# Patient Record
Sex: FEMALE | Race: WHITE | Employment: OTHER | URBAN - METROPOLITAN AREA
[De-identification: names, ages, dates, MRNs, and addresses within clinical notes are randomized per-mention and may not be internally consistent; named-entity substitution may affect disease eponyms.]

---

## 2017-01-10 RX ORDER — DILTIAZEM HYDROCHLORIDE 180 MG/1
180 CAPSULE, COATED, EXTENDED RELEASE ORAL DAILY
Qty: 30 CAPSULE | Refills: 3 | Status: SHIPPED | OUTPATIENT
Start: 2017-01-10 | End: 2018-06-05 | Stop reason: SDUPTHER

## 2017-01-18 ENCOUNTER — NURSE ONLY (OUTPATIENT)
Dept: CARDIOLOGY CLINIC | Age: 82
End: 2017-01-18

## 2017-01-18 DIAGNOSIS — R55 SYNCOPE AND COLLAPSE: ICD-10-CM

## 2017-01-18 DIAGNOSIS — Z45.09 ENCOUNTER FOR ELECTRONIC ANALYSIS OF REVEAL EVENT RECORDER: ICD-10-CM

## 2017-01-18 PROCEDURE — 93299 PR REM INTERROG ICPMS/SCRMS <30 D TECH REVIEW: CPT | Performed by: INTERNAL MEDICINE

## 2017-01-18 PROCEDURE — 93298 REM INTERROG DEV EVAL SCRMS: CPT | Performed by: INTERNAL MEDICINE

## 2017-02-09 PROBLEM — M81.0 OSTEOPOROSIS: Chronic | Status: ACTIVE | Noted: 2017-02-09

## 2017-02-09 PROBLEM — Z51.81 MEDICATION MONITORING ENCOUNTER: Chronic | Status: ACTIVE | Noted: 2017-02-09

## 2017-02-22 ENCOUNTER — NURSE ONLY (OUTPATIENT)
Dept: CARDIOLOGY CLINIC | Age: 82
End: 2017-02-22

## 2017-02-22 DIAGNOSIS — Z45.09 ENCOUNTER FOR ELECTRONIC ANALYSIS OF REVEAL EVENT RECORDER: ICD-10-CM

## 2017-02-22 DIAGNOSIS — R55 SYNCOPE AND COLLAPSE: ICD-10-CM

## 2017-02-22 PROCEDURE — 93299 PR REM INTERROG ICPMS/SCRMS <30 D TECH REVIEW: CPT | Performed by: INTERNAL MEDICINE

## 2017-02-22 PROCEDURE — 93298 REM INTERROG DEV EVAL SCRMS: CPT | Performed by: INTERNAL MEDICINE

## 2017-03-29 ENCOUNTER — NURSE ONLY (OUTPATIENT)
Dept: CARDIOLOGY CLINIC | Age: 82
End: 2017-03-29

## 2017-03-29 DIAGNOSIS — Z45.09 ENCOUNTER FOR ELECTRONIC ANALYSIS OF REVEAL EVENT RECORDER: ICD-10-CM

## 2017-03-29 DIAGNOSIS — R55 SYNCOPE AND COLLAPSE: ICD-10-CM

## 2017-03-29 PROCEDURE — 93298 REM INTERROG DEV EVAL SCRMS: CPT | Performed by: INTERNAL MEDICINE

## 2017-03-29 PROCEDURE — 93299 PR REM INTERROG ICPMS/SCRMS <30 D TECH REVIEW: CPT | Performed by: INTERNAL MEDICINE

## 2017-04-11 ENCOUNTER — HOSPITAL ENCOUNTER (OUTPATIENT)
Dept: OTHER | Age: 82
Discharge: HOME | End: 2017-04-11
Attending: INTERNAL MEDICINE | Admitting: INTERNAL MEDICINE

## 2017-04-11 ENCOUNTER — OFFICE VISIT (OUTPATIENT)
Dept: CARDIOLOGY CLINIC | Age: 82
End: 2017-04-11

## 2017-04-11 VITALS
SYSTOLIC BLOOD PRESSURE: 122 MMHG | HEART RATE: 68 BPM | WEIGHT: 163 LBS | BODY MASS INDEX: 28.88 KG/M2 | OXYGEN SATURATION: 97 % | HEIGHT: 63 IN | DIASTOLIC BLOOD PRESSURE: 64 MMHG

## 2017-04-11 DIAGNOSIS — E55.9 VITAMIN D INSUFFICIENCY: ICD-10-CM

## 2017-04-11 DIAGNOSIS — E55.9 VITAMIN D INSUFFICIENCY: Primary | ICD-10-CM

## 2017-04-11 PROBLEM — Z51.81 MEDICATION MONITORING ENCOUNTER: Status: ACTIVE | Noted: 2017-02-09

## 2017-04-11 LAB — VITAMIN D 25-HYDROXY: 37 NG/ML

## 2017-04-11 PROCEDURE — 1123F ACP DISCUSS/DSCN MKR DOCD: CPT | Performed by: INTERNAL MEDICINE

## 2017-04-11 PROCEDURE — G8427 DOCREV CUR MEDS BY ELIG CLIN: HCPCS | Performed by: INTERNAL MEDICINE

## 2017-04-11 PROCEDURE — G8399 PT W/DXA RESULTS DOCUMENT: HCPCS | Performed by: INTERNAL MEDICINE

## 2017-04-11 PROCEDURE — 99214 OFFICE O/P EST MOD 30 MIN: CPT | Performed by: INTERNAL MEDICINE

## 2017-04-11 PROCEDURE — G8420 CALC BMI NORM PARAMETERS: HCPCS | Performed by: INTERNAL MEDICINE

## 2017-04-11 PROCEDURE — 4040F PNEUMOC VAC/ADMIN/RCVD: CPT | Performed by: INTERNAL MEDICINE

## 2017-04-11 PROCEDURE — 1036F TOBACCO NON-USER: CPT | Performed by: INTERNAL MEDICINE

## 2017-04-11 PROCEDURE — 1090F PRES/ABSN URINE INCON ASSESS: CPT | Performed by: INTERNAL MEDICINE

## 2017-04-11 PROCEDURE — G8598 ASA/ANTIPLAT THER USED: HCPCS | Performed by: INTERNAL MEDICINE

## 2017-04-12 ENCOUNTER — TELEPHONE (OUTPATIENT)
Dept: CARDIOLOGY CLINIC | Age: 82
End: 2017-04-12

## 2017-04-21 ENCOUNTER — TELEPHONE (OUTPATIENT)
Dept: CARDIOLOGY CLINIC | Age: 82
End: 2017-04-21

## 2017-05-03 ENCOUNTER — NURSE ONLY (OUTPATIENT)
Dept: CARDIOLOGY CLINIC | Age: 82
End: 2017-05-03

## 2017-05-03 DIAGNOSIS — R55 SYNCOPE AND COLLAPSE: ICD-10-CM

## 2017-05-03 DIAGNOSIS — Z45.09 ENCOUNTER FOR ELECTRONIC ANALYSIS OF REVEAL EVENT RECORDER: ICD-10-CM

## 2017-05-03 PROCEDURE — 93298 REM INTERROG DEV EVAL SCRMS: CPT | Performed by: INTERNAL MEDICINE

## 2017-05-03 PROCEDURE — 93299 PR REM INTERROG ICPMS/SCRMS <30 D TECH REVIEW: CPT | Performed by: INTERNAL MEDICINE

## 2017-05-05 ENCOUNTER — TELEPHONE (OUTPATIENT)
Dept: CARDIOLOGY CLINIC | Age: 82
End: 2017-05-05

## 2017-06-20 ENCOUNTER — OFFICE VISIT (OUTPATIENT)
Dept: CARDIOLOGY CLINIC | Age: 82
End: 2017-06-20

## 2017-06-20 ENCOUNTER — TELEPHONE (OUTPATIENT)
Dept: CARDIOLOGY CLINIC | Age: 82
End: 2017-06-20

## 2017-06-20 VITALS
HEIGHT: 63 IN | DIASTOLIC BLOOD PRESSURE: 68 MMHG | BODY MASS INDEX: 28.07 KG/M2 | SYSTOLIC BLOOD PRESSURE: 122 MMHG | OXYGEN SATURATION: 97 % | HEART RATE: 84 BPM | WEIGHT: 158.4 LBS

## 2017-06-20 DIAGNOSIS — I65.29 STENOSIS OF CAROTID ARTERY, UNSPECIFIED LATERALITY: ICD-10-CM

## 2017-06-20 DIAGNOSIS — I48.0 PAROXYSMAL ATRIAL FIBRILLATION (HCC): ICD-10-CM

## 2017-06-20 DIAGNOSIS — R00.2 PALPITATIONS: Chronic | ICD-10-CM

## 2017-06-20 DIAGNOSIS — E78.5 HYPERLIPIDEMIA, UNSPECIFIED HYPERLIPIDEMIA TYPE: Chronic | ICD-10-CM

## 2017-06-20 DIAGNOSIS — I10 ESSENTIAL HYPERTENSION: Primary | Chronic | ICD-10-CM

## 2017-06-20 PROBLEM — M81.0 OSTEOPOROSIS: Status: ACTIVE | Noted: 2017-02-09

## 2017-06-20 PROCEDURE — G8598 ASA/ANTIPLAT THER USED: HCPCS | Performed by: INTERNAL MEDICINE

## 2017-06-20 PROCEDURE — 1090F PRES/ABSN URINE INCON ASSESS: CPT | Performed by: INTERNAL MEDICINE

## 2017-06-20 PROCEDURE — 1036F TOBACCO NON-USER: CPT | Performed by: INTERNAL MEDICINE

## 2017-06-20 PROCEDURE — 4040F PNEUMOC VAC/ADMIN/RCVD: CPT | Performed by: INTERNAL MEDICINE

## 2017-06-20 PROCEDURE — G8419 CALC BMI OUT NRM PARAM NOF/U: HCPCS | Performed by: INTERNAL MEDICINE

## 2017-06-20 PROCEDURE — 99213 OFFICE O/P EST LOW 20 MIN: CPT | Performed by: INTERNAL MEDICINE

## 2017-06-20 PROCEDURE — G8399 PT W/DXA RESULTS DOCUMENT: HCPCS | Performed by: INTERNAL MEDICINE

## 2017-06-20 PROCEDURE — G8427 DOCREV CUR MEDS BY ELIG CLIN: HCPCS | Performed by: INTERNAL MEDICINE

## 2017-06-20 PROCEDURE — 1123F ACP DISCUSS/DSCN MKR DOCD: CPT | Performed by: INTERNAL MEDICINE

## 2017-06-21 ENCOUNTER — NURSE ONLY (OUTPATIENT)
Dept: CARDIOLOGY CLINIC | Age: 82
End: 2017-06-21

## 2017-06-21 DIAGNOSIS — R55 SYNCOPE AND COLLAPSE: ICD-10-CM

## 2017-06-21 DIAGNOSIS — Z45.09 ENCOUNTER FOR ELECTRONIC ANALYSIS OF REVEAL EVENT RECORDER: ICD-10-CM

## 2017-06-21 PROCEDURE — 93298 REM INTERROG DEV EVAL SCRMS: CPT | Performed by: INTERNAL MEDICINE

## 2017-06-21 PROCEDURE — 93299 PR REM INTERROG ICPMS/SCRMS <30 D TECH REVIEW: CPT | Performed by: INTERNAL MEDICINE

## 2017-07-26 ENCOUNTER — NURSE ONLY (OUTPATIENT)
Dept: CARDIOLOGY CLINIC | Age: 82
End: 2017-07-26

## 2017-07-26 DIAGNOSIS — Z45.09 ENCOUNTER FOR ELECTRONIC ANALYSIS OF REVEAL EVENT RECORDER: ICD-10-CM

## 2017-07-26 DIAGNOSIS — R55 SYNCOPE AND COLLAPSE: ICD-10-CM

## 2017-07-26 PROCEDURE — 93298 REM INTERROG DEV EVAL SCRMS: CPT | Performed by: INTERNAL MEDICINE

## 2017-07-26 PROCEDURE — 93299 PR REM INTERROG ICPMS/SCRMS <30 D TECH REVIEW: CPT | Performed by: INTERNAL MEDICINE

## 2017-08-30 ENCOUNTER — NURSE ONLY (OUTPATIENT)
Dept: CARDIOLOGY CLINIC | Age: 82
End: 2017-08-30

## 2017-08-30 DIAGNOSIS — R55 SYNCOPE AND COLLAPSE: ICD-10-CM

## 2017-08-30 DIAGNOSIS — Z45.09 ENCOUNTER FOR ELECTRONIC ANALYSIS OF REVEAL EVENT RECORDER: ICD-10-CM

## 2017-08-31 PROCEDURE — 93298 REM INTERROG DEV EVAL SCRMS: CPT | Performed by: INTERNAL MEDICINE

## 2017-08-31 PROCEDURE — 93299 PR REM INTERROG ICPMS/SCRMS <30 D TECH REVIEW: CPT | Performed by: INTERNAL MEDICINE

## 2017-11-07 ENCOUNTER — OFFICE VISIT (OUTPATIENT)
Dept: CARDIOLOGY CLINIC | Age: 82
End: 2017-11-07

## 2017-11-07 VITALS
HEART RATE: 76 BPM | WEIGHT: 160.75 LBS | BODY MASS INDEX: 28.48 KG/M2 | SYSTOLIC BLOOD PRESSURE: 120 MMHG | DIASTOLIC BLOOD PRESSURE: 80 MMHG

## 2017-11-07 DIAGNOSIS — R00.2 PALPITATIONS: Primary | Chronic | ICD-10-CM

## 2017-11-07 DIAGNOSIS — E78.5 HYPERLIPIDEMIA, UNSPECIFIED HYPERLIPIDEMIA TYPE: Chronic | ICD-10-CM

## 2017-11-07 DIAGNOSIS — I10 ESSENTIAL HYPERTENSION: Chronic | ICD-10-CM

## 2017-11-07 DIAGNOSIS — I48.0 PAROXYSMAL ATRIAL FIBRILLATION (HCC): ICD-10-CM

## 2017-11-07 PROCEDURE — 1123F ACP DISCUSS/DSCN MKR DOCD: CPT | Performed by: INTERNAL MEDICINE

## 2017-11-07 PROCEDURE — 99214 OFFICE O/P EST MOD 30 MIN: CPT | Performed by: INTERNAL MEDICINE

## 2017-11-07 PROCEDURE — G8484 FLU IMMUNIZE NO ADMIN: HCPCS | Performed by: INTERNAL MEDICINE

## 2017-11-07 PROCEDURE — G8598 ASA/ANTIPLAT THER USED: HCPCS | Performed by: INTERNAL MEDICINE

## 2017-11-07 PROCEDURE — 93000 ELECTROCARDIOGRAM COMPLETE: CPT | Performed by: INTERNAL MEDICINE

## 2017-11-07 PROCEDURE — G8427 DOCREV CUR MEDS BY ELIG CLIN: HCPCS | Performed by: INTERNAL MEDICINE

## 2017-11-07 PROCEDURE — 1090F PRES/ABSN URINE INCON ASSESS: CPT | Performed by: INTERNAL MEDICINE

## 2017-11-07 PROCEDURE — G8417 CALC BMI ABV UP PARAM F/U: HCPCS | Performed by: INTERNAL MEDICINE

## 2017-11-07 PROCEDURE — 4040F PNEUMOC VAC/ADMIN/RCVD: CPT | Performed by: INTERNAL MEDICINE

## 2017-11-07 PROCEDURE — G8399 PT W/DXA RESULTS DOCUMENT: HCPCS | Performed by: INTERNAL MEDICINE

## 2017-11-07 PROCEDURE — 1036F TOBACCO NON-USER: CPT | Performed by: INTERNAL MEDICINE

## 2017-11-07 NOTE — PROGRESS NOTES
[facility]   Cardiac Evaluation      Patient: Tommy Cortez  YOB: 1935  Date: 11/7/17       Chief Complaint   Patient presents with    Hyperlipidemia    Atrial Fibrillation    3 Month Follow-Up        Referring provider: Rita Hopper MD    History of Present Illness:   Ms Angelica Bang is seen today in follow up. She is a previous patient of Dr Regla Hidalgo. Cardiac history includes chronic atrial fibrillation, hypertension, and hyperlipidemia. She has ILR placed 3/16 after syncopal episode. She does not smoke. Is here with her . She denies any chest pain, palpitations, WARD, dizziness, or edema. She is active and exercises regularly at the [address] [place] [address]. She exercises 4-5 times per week 30 minutes each. 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 Past Medical History:   has a past medical history of Arthritis; Atrial fibrillation ([facility] [address] Cancer ([facility] [address] Carotid artery stenosis; Chronic kidney disease; Fx ankle; History of blood transfusion; Hyperlipidemia; Hypertension; Movement disorder; Psychiatric problem; Shingles; and Thyroid disease. Surgical History:   has a past surgical history that includes Hysterectomy; shoulder surgery; other surgical history; Ankle surgery (2/4/15); Colonoscopy; Endoscopy, colon, diagnostic; eye surgery; Breast surgery; fracture surgery (2010, 2015 ); and other surgical history (3/22/16 ).      Current Outpatient Prescriptions   Medication Sig Dispense Refill    Ergocalciferol 2000 UNITS TABS Take 200 mg by mouth daily 90 tablet 3    diltiazem (CARDIZEM CD) 180 MG extended release capsule Take 1 capsule by mouth daily 30 capsule 3    valsartan (DIOVAN) 80 MG tablet Take 1 tablet by mouth daily 30 tablet 3    rivaroxaban (XARELTO) 20 MG TABS tablet Take 1 tablet by mouth daily 30 tablet 3    atorvastatin (LIPITOR) 20 MG tablet Take 1 tablet by mouth nightly (Patient taking differently: Take 20 mg by mouth Every other day) 30 tablet 3    Coenzyme Q10 (CO Q 10 PO) Take by mouth      Calcium-Phosphorus-Vitamin D (CITRACAL +D3 PO) Take by mouth      levothyroxine (SYNTHROID) 75 MCG tablet Take 88 mcg by mouth Daily       Turmeric Curcumin 500 MG CAPS Take 1 tablet by mouth daily        No current facility-administered medications for this visit. Social History:  Social History     Social History    Marital status:      Spouse name: N/A    Number of children: N/A    Years of education: N/A     Occupational History    Not on file. Social History Main Topics    Smoking status: Never Smoker    Smokeless tobacco: Never Used    Alcohol use 4.2 oz/week     7 Glasses of wine per week    Drug use: No    Sexual activity: Not on file     Other Topics Concern    Not on file     Social History Narrative    No narrative on file       Family History:  family history includes Heart Disease in her mother. Allergies:  Review of patient's allergies indicates no known allergies. Review of Systems:   · Constitutional: there has been no unanticipated weight loss. No change in energy or activity level   · Eyes: No visual changes   · ENT: No Headaches, hearing loss or vertigo. No mouth sores or sore throat. · Cardiovascular: Reviewed in HPI  · Respiratory: No cough or wheezing, no sputum production. · Gastrointestinal: No abdominal pain, appetite loss, blood in stools. No change in bowel or bladder habits. · Genitourinary: No nocturia, dysuria, trouble voiding  · Musculoskeletal:  No gait disturbance, weakness or joint complaints. · Integumentary: No rash or pruritis. · Neurological: No headache, change in muscle strength, numbness or tingling. No change in gait, balance, coordination, mood, affect, memory, mentation, behavior. · Psychiatric: No anxiety or depression  · Endocrine: No malaise or fever  · Hematologic/Lymphatic: No abnormal bruising or bleeding, blood clots or swollen lymph nodes.   · Allergic/Immunologic: No nasal congestion or

## 2017-11-15 ENCOUNTER — TELEPHONE (OUTPATIENT)
Dept: CARDIOLOGY CLINIC | Age: 82
End: 2017-11-15

## 2017-12-06 ENCOUNTER — NURSE ONLY (OUTPATIENT)
Dept: CARDIOLOGY CLINIC | Age: 82
End: 2017-12-06

## 2017-12-06 DIAGNOSIS — Z45.09 ENCOUNTER FOR ELECTRONIC ANALYSIS OF REVEAL EVENT RECORDER: ICD-10-CM

## 2017-12-06 DIAGNOSIS — R55 SYNCOPE AND COLLAPSE: ICD-10-CM

## 2017-12-06 PROCEDURE — 93298 REM INTERROG DEV EVAL SCRMS: CPT | Performed by: INTERNAL MEDICINE

## 2017-12-06 PROCEDURE — 93299 PR REM INTERROG ICPMS/SCRMS <30 D TECH REVIEW: CPT | Performed by: INTERNAL MEDICINE

## 2017-12-06 NOTE — LETTER
[address] [facility] [phone]  [address] [facility],4Th Floor [phone]    Pacemaker/Defibrillator Clinic          12/07/17        [address] [address]        Dear Yovani Crouch    This letter is to inform you that we received the transmission from your monitor at home that checks your pacemaker and/or defibrillator, or implanted heart monitor. Everything is within normal limits. The next date your monitor will automatically transmit will be 1-9-18. Please do not send additional routine transmissions unless specifically requested. Your device and monitor are wireless and most transmit cellularly, but please periodically check your monitor is still plugged in to the electrical outlet. If you still use the telephone land line to send please ensure the connection to the phone chavez is secure. This will help to ensure successful automatic transmissions in the future. Also, the monitor needs to be close to you while sleeping at night. Please be aware that the remote device transmission sites are periodically monitored only during regular business hours during which simultaneous in-office device clinics are being run. If your transmission requires attention, we will contact you as soon as possible. Thank you.             [address]

## 2017-12-07 NOTE — PROGRESS NOTES
Patient has implanted loop recorder. Carelink shows what appears to be NSVT, patient showed no symptom with this. She has known atrial fib and is on Xarelto. Please review.  LD

## 2018-01-01 ENCOUNTER — TELEPHONE (OUTPATIENT)
Dept: CARDIOLOGY CLINIC | Age: 83
End: 2018-01-01

## 2018-01-01 ENCOUNTER — OFFICE VISIT (OUTPATIENT)
Dept: INTERNAL MEDICINE CLINIC | Age: 83
End: 2018-01-01
Payer: MEDICARE

## 2018-01-01 ENCOUNTER — OFFICE VISIT (OUTPATIENT)
Dept: CARDIOLOGY CLINIC | Age: 83
End: 2018-01-01
Payer: MEDICARE

## 2018-01-01 ENCOUNTER — OFFICE VISIT (OUTPATIENT)
Dept: PSYCHOLOGY | Age: 83
End: 2018-01-01
Payer: MEDICARE

## 2018-01-01 VITALS
RESPIRATION RATE: 16 BRPM | SYSTOLIC BLOOD PRESSURE: 96 MMHG | OXYGEN SATURATION: 97 % | WEIGHT: 152.3 LBS | HEART RATE: 89 BPM | BODY MASS INDEX: 22.82 KG/M2 | DIASTOLIC BLOOD PRESSURE: 58 MMHG

## 2018-01-01 VITALS
SYSTOLIC BLOOD PRESSURE: 98 MMHG | OXYGEN SATURATION: 98 % | DIASTOLIC BLOOD PRESSURE: 60 MMHG | BODY MASS INDEX: 22.72 KG/M2 | HEIGHT: 69 IN | WEIGHT: 153.4 LBS | HEART RATE: 82 BPM | TEMPERATURE: 97.7 F

## 2018-01-01 DIAGNOSIS — E03.8 OTHER SPECIFIED HYPOTHYROIDISM: ICD-10-CM

## 2018-01-01 DIAGNOSIS — I48.0 PAF (PAROXYSMAL ATRIAL FIBRILLATION) (HCC): ICD-10-CM
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
## 2018-01-01 DIAGNOSIS — R53.83 OTHER FATIGUE: ICD-10-CM

## 2018-01-01 DIAGNOSIS — G47.09 OTHER INSOMNIA: Primary | ICD-10-CM

## 2018-01-01 DIAGNOSIS — I10 ESSENTIAL HYPERTENSION: ICD-10-CM

## 2018-01-01 DIAGNOSIS — I50.30 (HFPEF) HEART FAILURE WITH PRESERVED EJECTION FRACTION (HCC): Primary | ICD-10-CM

## 2018-01-01 DIAGNOSIS — F32.1 CURRENT MODERATE EPISODE OF MAJOR DEPRESSIVE DISORDER WITHOUT PRIOR EPISODE (HCC): Primary | ICD-10-CM

## 2018-01-01 DIAGNOSIS — R06.2 WHEEZING: ICD-10-CM

## 2018-01-01 DIAGNOSIS — E87.6 HYPOKALEMIA: ICD-10-CM

## 2018-01-01 DIAGNOSIS — F32.9 CURRENT EPISODE OF MAJOR DEPRESSIVE DISORDER WITHOUT PRIOR EPISODE, UNSPECIFIED DEPRESSION EPISODE SEVERITY: ICD-10-CM

## 2018-01-01 LAB
A/G RATIO: 1.3 (ref 1.1–2.2)
ALBUMIN SERPL-MCNC: 4.3 G/DL (ref 3.4–5)
ALP BLD-CCNC: 76 U/L (ref 40–129)
ALT SERPL-CCNC: 27 U/L (ref 10–40)
ANION GAP SERPL CALCULATED.3IONS-SCNC: 14 MMOL/L (ref 3–16)
AST SERPL-CCNC: 31 U/L (ref 15–37)
BASOPHILS ABSOLUTE: 0 K/UL (ref 0–0.2)
BASOPHILS RELATIVE PERCENT: 0.4 %
BILIRUB SERPL-MCNC: 1 MG/DL (ref 0–1)
BILIRUBIN URINE: NEGATIVE
BLOOD, URINE: NEGATIVE
BUN BLDV-MCNC: 27 MG/DL (ref 7–20)
CALCIUM SERPL-MCNC: 9.4 MG/DL (ref 8.3–10.6)
CHLORIDE BLD-SCNC: 99 MMOL/L (ref 99–110)
CLARITY: CLEAR
CO2: 26 MMOL/L (ref 21–32)
COLOR: YELLOW
CREAT SERPL-MCNC: 1 MG/DL (ref 0.6–1.2)
EOSINOPHILS ABSOLUTE: 0.1 K/UL (ref 0–0.6)
EOSINOPHILS RELATIVE PERCENT: 1.6 %
GFR AFRICAN AMERICAN: >60
GFR NON-AFRICAN AMERICAN: 53
GLOBULIN: 3.3 G/DL
GLUCOSE BLD-MCNC: 96 MG/DL (ref 70–99)
GLUCOSE URINE: NEGATIVE MG/DL
HCT VFR BLD CALC: 39.8 % (ref 36–48)
HEMOGLOBIN: 13.6 G/DL (ref 12–16)
KETONES, URINE: NEGATIVE MG/DL
LEUKOCYTE ESTERASE, URINE: NEGATIVE
LYMPHOCYTES ABSOLUTE: 1.3 K/UL (ref 1–5.1)
LYMPHOCYTES RELATIVE PERCENT: 35 %
MCH RBC QN AUTO: 34.3 PG (ref 26–34)
MCHC RBC AUTO-ENTMCNC: 34.1 G/DL (ref 31–36)
MCV RBC AUTO: 100.5 FL (ref 80–100)
MICROSCOPIC EXAMINATION: NORMAL
MONOCYTES ABSOLUTE: 0.4 K/UL (ref 0–1.3)
MONOCYTES RELATIVE PERCENT: 9.3 %
NEUTROPHILS ABSOLUTE: 2.1 K/UL (ref 1.7–7.7)
NEUTROPHILS RELATIVE PERCENT: 53.7 %
NITRITE, URINE: NEGATIVE
PDW BLD-RTO: 13.7 % (ref 12.4–15.4)
PH UA: 7
PLATELET # BLD: 156 K/UL (ref 135–450)
PMV BLD AUTO: 9.8 FL (ref 5–10.5)
POTASSIUM SERPL-SCNC: 4.7 MMOL/L (ref 3.5–5.1)
PROTEIN UA: NEGATIVE MG/DL
RBC # BLD: 3.96 M/UL (ref 4–5.2)
SODIUM BLD-SCNC: 139 MMOL/L (ref 136–145)
SPECIFIC GRAVITY UA: 1.01
T4 FREE: 2.2 NG/DL (ref 0.9–1.8)
TOTAL PROTEIN: 7.6 G/DL (ref 6.4–8.2)
TSH REFLEX FT4: 0.26 UIU/ML (ref 0.27–4.2)
URINE CULTURE, ROUTINE: NORMAL
URINE TYPE: NORMAL
UROBILINOGEN, URINE: 0.2 E.U./DL
WBC # BLD: 3.8 K/UL (ref 4–11)

## 2018-01-01 PROCEDURE — 99214 OFFICE O/P EST MOD 30 MIN: CPT | Performed by: NURSE PRACTITIONER

## 2018-01-01 PROCEDURE — G8399 PT W/DXA RESULTS DOCUMENT: HCPCS | Performed by: NURSE PRACTITIONER

## 2018-01-01 PROCEDURE — G8427 DOCREV CUR MEDS BY ELIG CLIN: HCPCS | Performed by: NURSE PRACTITIONER

## 2018-01-01 PROCEDURE — 1123F ACP DISCUSS/DSCN MKR DOCD: CPT | Performed by: NURSE PRACTITIONER

## 2018-01-01 PROCEDURE — 1090F PRES/ABSN URINE INCON ASSESS: CPT | Performed by: NURSE PRACTITIONER

## 2018-01-01 PROCEDURE — G8420 CALC BMI NORM PARAMETERS: HCPCS | Performed by: NURSE PRACTITIONER

## 2018-01-01 PROCEDURE — 1101F PT FALLS ASSESS-DOCD LE1/YR: CPT | Performed by: NURSE PRACTITIONER

## 2018-01-01 PROCEDURE — 1036F TOBACCO NON-USER: CPT | Performed by: NURSE PRACTITIONER

## 2018-01-01 PROCEDURE — G8598 ASA/ANTIPLAT THER USED: HCPCS | Performed by: NURSE PRACTITIONER

## 2018-01-01 PROCEDURE — 90791 PSYCH DIAGNOSTIC EVALUATION: CPT | Performed by: PSYCHOLOGIST

## 2018-01-01 PROCEDURE — G8484 FLU IMMUNIZE NO ADMIN: HCPCS | Performed by: NURSE PRACTITIONER

## 2018-01-01 PROCEDURE — 4040F PNEUMOC VAC/ADMIN/RCVD: CPT | Performed by: NURSE PRACTITIONER

## 2018-01-01 PROCEDURE — 94640 AIRWAY INHALATION TREATMENT: CPT | Performed by: NURSE PRACTITIONER

## 2018-01-01 RX ORDER — FUROSEMIDE 20 MG/1
20 TABLET ORAL DAILY PRN
Qty: 60 TABLET | Refills: 3
Start: 2018-01-01 | End: 2019-01-01

## 2018-01-01 RX ORDER — TRAZODONE HYDROCHLORIDE 50 MG/1
50 TABLET ORAL NIGHTLY
Qty: 30 TABLET | Refills: 1 | Status: SHIPPED | OUTPATIENT
Start: 2018-01-01 | End: 2018-01-01 | Stop reason: ALTCHOICE

## 2018-01-01 RX ORDER — FUROSEMIDE 20 MG/1
40 TABLET ORAL DAILY
Qty: 60 TABLET | Status: CANCELLED | OUTPATIENT
Start: 2018-01-01

## 2018-01-01 RX ORDER — ALBUTEROL SULFATE 2.5 MG/3ML
2.5 SOLUTION RESPIRATORY (INHALATION) ONCE
Status: COMPLETED | OUTPATIENT
Start: 2018-01-01 | End: 2018-01-01

## 2018-01-01 RX ORDER — LOSARTAN POTASSIUM 50 MG/1
50 TABLET ORAL DAILY
Qty: 30 TABLET | Status: CANCELLED | OUTPATIENT
Start: 2018-01-01

## 2018-01-01 RX ORDER — ATORVASTATIN CALCIUM 20 MG/1
20 TABLET, FILM COATED ORAL DAILY
Qty: 90 TABLET | Refills: 0 | Status: CANCELLED | OUTPATIENT
Start: 2018-01-01

## 2018-01-01 RX ORDER — POTASSIUM CHLORIDE 20 MEQ/1
20 TABLET, EXTENDED RELEASE ORAL DAILY
Qty: 60 TABLET | Refills: 0 | Status: SHIPPED | OUTPATIENT
Start: 2018-01-01 | End: 2018-01-01 | Stop reason: SDUPTHER

## 2018-01-01 RX ORDER — LEVOTHYROXINE SODIUM 0.1 MG/1
100 TABLET ORAL DAILY
Qty: 30 TABLET | Refills: 1 | Status: SHIPPED | OUTPATIENT
Start: 2018-01-01 | End: 2019-01-01 | Stop reason: SDUPTHER

## 2018-01-01 RX ORDER — POTASSIUM CHLORIDE 20 MEQ/1
20 TABLET, EXTENDED RELEASE ORAL PRN
Qty: 60 TABLET | Refills: 0
Start: 2018-01-01 | End: 2019-01-01 | Stop reason: ALTCHOICE

## 2018-01-01 RX ADMIN — ALBUTEROL SULFATE 2.5 MG: 2.5 SOLUTION RESPIRATORY (INHALATION) at 07:01

## 2018-01-01 ASSESSMENT — ENCOUNTER SYMPTOMS
WHEEZING: 1
CONSTIPATION: 0
DIARRHEA: 0
NAUSEA: 0
ABDOMINAL PAIN: 0
SHORTNESS OF BREATH: 0
BLOOD IN STOOL: 0
VOMITING: 0
COUGH: 0

## 2018-01-09 ENCOUNTER — NURSE ONLY (OUTPATIENT)
Dept: CARDIOLOGY CLINIC | Age: 83
End: 2018-01-09

## 2018-01-09 DIAGNOSIS — R55 SYNCOPE AND COLLAPSE: ICD-10-CM

## 2018-01-09 DIAGNOSIS — Z45.09 ENCOUNTER FOR ELECTRONIC ANALYSIS OF REVEAL EVENT RECORDER: ICD-10-CM

## 2018-01-10 PROCEDURE — 93299 PR REM INTERROG ICPMS/SCRMS <30 D TECH REVIEW: CPT | Performed by: INTERNAL MEDICINE

## 2018-01-10 PROCEDURE — 93298 REM INTERROG DEV EVAL SCRMS: CPT | Performed by: INTERNAL MEDICINE

## 2018-01-10 NOTE — PROGRESS NOTES
Carelink/loop recorder transmission shows known atrial fib, pt is on Xarelto. NSVT recordings noted, pt showed no symptoms with this. Please review.  LD

## 2018-02-13 ENCOUNTER — NURSE ONLY (OUTPATIENT)
Dept: CARDIOLOGY CLINIC | Age: 83
End: 2018-02-13

## 2018-02-13 DIAGNOSIS — R55 SYNCOPE AND COLLAPSE: ICD-10-CM

## 2018-02-13 DIAGNOSIS — Z45.09 ENCOUNTER FOR ELECTRONIC ANALYSIS OF REVEAL EVENT RECORDER: ICD-10-CM

## 2018-02-13 NOTE — LETTER
[address] [facility] [phone] [address] [facility],4Th Floor [phone]    Pacemaker/Defibrillator Clinic          02/14/18        [address] [address]        Dear Meg Hernandez    This letter is to inform you that we received the transmission from your monitor at home that checks your pacemaker and/or defibrillator, or implanted heart monitor. The next date your monitor will automatically transmit will be 3-20-18. Please do not send additional routine transmissions unless specifically requested. Your device and monitor are wireless and most transmit cellularly, but please periodically check your monitor is still plugged in to the electrical outlet. If you still use the telephone land line to send please ensure the connection to the phone chavez is secure. This will help to ensure successful automatic transmissions in the future. Also, the monitor needs to be close to you while sleeping at night. Please be aware that the remote device transmission sites are periodically monitored only during regular business hours during which simultaneous in-office device clinics are being run. If your transmission requires attention, we will contact you as soon as possible. Thank you.             [address]

## 2018-02-14 PROCEDURE — 93299 PR REM INTERROG ICPMS/SCRMS <30 D TECH REVIEW: CPT | Performed by: INTERNAL MEDICINE

## 2018-02-14 PROCEDURE — 93298 REM INTERROG DEV EVAL SCRMS: CPT | Performed by: INTERNAL MEDICINE

## 2018-03-27 ENCOUNTER — NURSE ONLY (OUTPATIENT)
Dept: CARDIOLOGY CLINIC | Age: 83
End: 2018-03-27

## 2018-03-27 DIAGNOSIS — R55 SYNCOPE, UNSPECIFIED SYNCOPE TYPE: ICD-10-CM

## 2018-03-27 DIAGNOSIS — Z45.09 ENCOUNTER FOR ELECTRONIC ANALYSIS OF REVEAL EVENT RECORDER: ICD-10-CM

## 2018-03-27 PROCEDURE — 93299 PR REM INTERROG ICPMS/SCRMS <30 D TECH REVIEW: CPT | Performed by: INTERNAL MEDICINE

## 2018-03-27 PROCEDURE — 93298 REM INTERROG DEV EVAL SCRMS: CPT | Performed by: INTERNAL MEDICINE

## 2018-04-28 PROCEDURE — 93299 PR REM INTERROG ICPMS/SCRMS <30 D TECH REVIEW: CPT | Performed by: INTERNAL MEDICINE

## 2018-04-28 PROCEDURE — 93298 REM INTERROG DEV EVAL SCRMS: CPT | Performed by: INTERNAL MEDICINE

## 2018-05-01 ENCOUNTER — NURSE ONLY (OUTPATIENT)
Dept: CARDIOLOGY CLINIC | Age: 83
End: 2018-05-01

## 2018-05-01 DIAGNOSIS — R55 SYNCOPE AND COLLAPSE: ICD-10-CM

## 2018-05-01 DIAGNOSIS — Z45.09 ENCOUNTER FOR ELECTRONIC ANALYSIS OF REVEAL EVENT RECORDER: ICD-10-CM

## 2018-05-10 ENCOUNTER — HOSPITAL ENCOUNTER (OUTPATIENT)
Dept: VASCULAR LAB | Age: 83
Discharge: HOME | End: 2018-05-10
Attending: PHYSICIAN ASSISTANT | Admitting: PHYSICIAN ASSISTANT

## 2018-05-10 DIAGNOSIS — S80.02XA CONTUSION OF LEFT KNEE, INITIAL ENCOUNTER: ICD-10-CM

## 2018-05-10 DIAGNOSIS — S80.02XA CONTUSION OF LEFT KNEE: ICD-10-CM

## 2018-05-10 DIAGNOSIS — R60.0 LEG EDEMA, LEFT: ICD-10-CM

## 2018-05-10 DIAGNOSIS — S80.12XA CONTUSION OF LEFT LOWER LEG, INITIAL ENCOUNTER: ICD-10-CM

## 2018-05-28 PROCEDURE — 93294 REM INTERROG EVL PM/LDLS PM: CPT | Performed by: INTERNAL MEDICINE

## 2018-05-28 PROCEDURE — 93296 REM INTERROG EVL PM/IDS: CPT | Performed by: INTERNAL MEDICINE

## 2018-06-05 ENCOUNTER — NURSE ONLY (OUTPATIENT)
Dept: CARDIOLOGY CLINIC | Age: 83
End: 2018-06-05

## 2018-06-05 ENCOUNTER — OFFICE VISIT (OUTPATIENT)
Dept: CARDIOLOGY CLINIC | Age: 83
End: 2018-06-05

## 2018-06-05 VITALS
HEART RATE: 76 BPM | DIASTOLIC BLOOD PRESSURE: 66 MMHG | BODY MASS INDEX: 25.46 KG/M2 | HEIGHT: 68 IN | SYSTOLIC BLOOD PRESSURE: 116 MMHG | WEIGHT: 168 LBS

## 2018-06-05 DIAGNOSIS — R60.9 EDEMA, UNSPECIFIED TYPE: ICD-10-CM

## 2018-06-05 DIAGNOSIS — I65.23 OCCLUSION AND STENOSIS OF BILATERAL CAROTID ARTERIES: ICD-10-CM

## 2018-06-05 DIAGNOSIS — I10 ESSENTIAL HYPERTENSION: Chronic | ICD-10-CM

## 2018-06-05 DIAGNOSIS — R00.2 PALPITATIONS: Chronic | ICD-10-CM

## 2018-06-05 DIAGNOSIS — R55 SYNCOPE, UNSPECIFIED SYNCOPE TYPE: ICD-10-CM

## 2018-06-05 DIAGNOSIS — E78.5 HYPERLIPIDEMIA, UNSPECIFIED HYPERLIPIDEMIA TYPE: Primary | Chronic | ICD-10-CM

## 2018-06-05 DIAGNOSIS — I48.0 PAROXYSMAL ATRIAL FIBRILLATION (HCC): ICD-10-CM

## 2018-06-05 DIAGNOSIS — Z45.09 ENCOUNTER FOR ELECTRONIC ANALYSIS OF REVEAL EVENT RECORDER: Primary | ICD-10-CM

## 2018-06-05 PROCEDURE — 99214 OFFICE O/P EST MOD 30 MIN: CPT | Performed by: INTERNAL MEDICINE

## 2018-06-05 RX ORDER — VALSARTAN 80 MG/1
80 TABLET ORAL DAILY
Qty: 30 TABLET | Refills: 11 | Status: SHIPPED | OUTPATIENT
Start: 2018-06-05 | End: 2018-10-29

## 2018-06-05 RX ORDER — FUROSEMIDE 20 MG/1
20 TABLET ORAL SEE ADMIN INSTRUCTIONS
Qty: 30 TABLET | Refills: 11 | Status: SHIPPED | OUTPATIENT
Start: 2018-06-05 | End: 2018-10-29 | Stop reason: ALTCHOICE

## 2018-06-05 RX ORDER — DILTIAZEM HYDROCHLORIDE 180 MG/1
180 CAPSULE, COATED, EXTENDED RELEASE ORAL DAILY
Qty: 30 CAPSULE | Refills: 11 | Status: SHIPPED | OUTPATIENT
Start: 2018-06-05 | End: 2019-01-01 | Stop reason: SDUPTHER

## 2018-06-12 ENCOUNTER — HOSPITAL ENCOUNTER (OUTPATIENT)
Dept: VASCULAR LAB | Age: 83
Discharge: HOME | End: 2018-06-12
Attending: INTERNAL MEDICINE | Admitting: INTERNAL MEDICINE

## 2018-06-12 DIAGNOSIS — I65.23 OCCLUSION AND STENOSIS OF BILATERAL CAROTID ARTERIES: ICD-10-CM

## 2018-07-10 ENCOUNTER — NURSE ONLY (OUTPATIENT)
Dept: CARDIOLOGY CLINIC | Age: 83
End: 2018-07-10

## 2018-07-10 DIAGNOSIS — R55 SYNCOPE AND COLLAPSE: ICD-10-CM

## 2018-07-10 DIAGNOSIS — Z45.09 ENCOUNTER FOR ELECTRONIC ANALYSIS OF REVEAL EVENT RECORDER: ICD-10-CM

## 2018-07-10 PROCEDURE — 93299 PR REM INTERROG ICPMS/SCRMS <30 D TECH REVIEW: CPT | Performed by: INTERNAL MEDICINE

## 2018-07-10 PROCEDURE — 93298 REM INTERROG DEV EVAL SCRMS: CPT | Performed by: INTERNAL MEDICINE

## 2018-07-10 NOTE — LETTER
[address] [facility] [phone] [address] [facility],4Th Floor [phone]    Pacemaker/Defibrillator Clinic          07/11/18        [address] [address]        Dear Jojo Gay    This letter is to inform you that we received the transmission from your monitor at home that checks your pacemaker and/or defibrillator, or implanted heart monitor. The next date you should transmit will be 8-21-18. Please do not send additional routine transmissions unless specifically requested. Please be aware that the remote device transmission sites are periodically monitored only during regular business hours during which simultaneous in-office device clinics are being run. If your transmission requires attention, we will contact you as soon as possible. Thank you.             [address]

## 2018-07-19 RX ORDER — ATORVASTATIN CALCIUM 20 MG/1
20 TABLET, FILM COATED ORAL DAILY
Qty: 90 TABLET | Refills: 3
Start: 2018-07-19 | End: 2019-01-01 | Stop reason: ALTCHOICE

## 2018-08-21 ENCOUNTER — NURSE ONLY (OUTPATIENT)
Dept: CARDIOLOGY CLINIC | Age: 83
End: 2018-08-21

## 2018-08-21 DIAGNOSIS — R55 SYNCOPE AND COLLAPSE: ICD-10-CM

## 2018-08-21 DIAGNOSIS — Z45.09 ENCOUNTER FOR LOOP RECORDER CHECK: Primary | ICD-10-CM

## 2018-08-21 PROCEDURE — 93299 PR REM INTERROG ICPMS/SCRMS <30 D TECH REVIEW: CPT | Performed by: INTERNAL MEDICINE

## 2018-08-21 PROCEDURE — 93298 REM INTERROG DEV EVAL SCRMS: CPT | Performed by: INTERNAL MEDICINE

## 2018-08-21 NOTE — LETTER
[address] [facility] [phone] [address] [facility],4Th Floor [phone]    Pacemaker/Defibrillator Clinic          08/22/18        [address] [address]        Dear Lindy Edgar    This letter is to inform you that we received the transmission from your monitor at home that checks your pacemaker and/or defibrillator, or implanted heart monitor. The next date your monitor will automatically transmit will be 9-25-18. Please do not send additional routine transmissions unless specifically requested. Your device and monitor are wireless and most transmit cellularly, but please periodically check your monitor is still plugged in to the electrical outlet. If you still use the telephone land line to send please ensure the connection to the phone chavez is secure. This will help to ensure successful automatic transmissions in the future. Also, the monitor needs to be close to you while sleeping at night. Please be aware that the remote device transmission sites are periodically monitored only during regular business hours during which simultaneous in-office device clinics are being run. If your transmission requires attention, we will contact you as soon as possible. Thank you.             [address]

## 2018-09-24 PROCEDURE — 93298 REM INTERROG DEV EVAL SCRMS: CPT | Performed by: INTERNAL MEDICINE

## 2018-09-24 PROCEDURE — 93299 PR REM INTERROG ICPMS/SCRMS <30 D TECH REVIEW: CPT | Performed by: INTERNAL MEDICINE

## 2018-09-25 ENCOUNTER — NURSE ONLY (OUTPATIENT)
Dept: CARDIOLOGY CLINIC | Age: 83
End: 2018-09-25
Payer: MEDICARE

## 2018-09-25 DIAGNOSIS — Z45.09 ENCOUNTER FOR LOOP RECORDER CHECK: ICD-10-CM

## 2018-09-25 DIAGNOSIS — R55 SYNCOPE AND COLLAPSE: ICD-10-CM

## 2018-09-25 NOTE — LETTER
[address] [facility] [phone]  [address] [facility],4Th Floor [phone]    Pacemaker/Defibrillator Clinic          09/26/18        [address] [address]        Dear Aimee Nation    This letter is to inform you that we received the transmission from your monitor at home that checks your pacemaker and/or defibrillator, or implanted heart monitor. Your report shows no recent abnormal rhythms. The next date your monitor will automatically transmit will be 10-30-18. Please do not send additional routine transmissions unless specifically requested. Your device and monitor are wireless and most transmit cellularly, but please periodically check your monitor is still plugged in to the electrical outlet. If you still use the telephone land line to send please ensure the connection to the phone chavez is secure. This will help to ensure successful automatic transmissions in the future. Also, the monitor needs to be close to you while sleeping at night. Please be aware that the remote device transmission sites are periodically monitored only during regular business hours during which simultaneous in-office device clinics are being run. If your transmission requires attention, we will contact you as soon as possible. Thank you.             [address]

## 2018-10-29 ENCOUNTER — APPOINTMENT (OUTPATIENT)
Dept: CT IMAGING | Age: 83
DRG: 517 | End: 2018-10-29
Payer: MEDICARE

## 2018-10-29 ENCOUNTER — HOSPITAL ENCOUNTER (INPATIENT)
Age: 83
LOS: 5 days | Discharge: NURSING, REHAB OR LONG-TERM CARE | DRG: 517 | End: 2018-11-03
Attending: EMERGENCY MEDICINE | Admitting: INTERNAL MEDICINE
Payer: MEDICARE

## 2018-10-29 ENCOUNTER — HOSPITAL ENCOUNTER (OUTPATIENT)
Age: 83
Discharge: HOME | DRG: 517 | End: 2018-10-29
Payer: MEDICARE

## 2018-10-29 ENCOUNTER — APPOINTMENT (OUTPATIENT)
Dept: MRI IMAGING | Age: 83
DRG: 517 | End: 2018-10-29
Payer: MEDICARE

## 2018-10-29 DIAGNOSIS — S32.010A CLOSED COMPRESSION FRACTURE OF FIRST LUMBAR VERTEBRA, INITIAL ENCOUNTER: Primary | ICD-10-CM
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
## 2018-10-29 PROBLEM — S32.010K: Status: ACTIVE | Noted: 2018-10-29

## 2018-10-29 LAB
A/G RATIO: 1 (ref 1.1–2.2)
ALBUMIN SERPL-MCNC: 3.6 G/DL (ref 3.4–5)
ALP BLD-CCNC: 78 U/L (ref 40–129)
ALT SERPL-CCNC: 15 U/L (ref 10–40)
ANION GAP SERPL CALCULATED.3IONS-SCNC: 12 MMOL/L (ref 3–16)
AST SERPL-CCNC: 24 U/L (ref 15–37)
BASOPHILS ABSOLUTE: 0 K/UL (ref 0–0.2)
BASOPHILS RELATIVE PERCENT: 0.5 %
BILIRUB SERPL-MCNC: 1.9 MG/DL (ref 0–1)
BUN BLDV-MCNC: 14 MG/DL (ref 7–20)
CALCIUM SERPL-MCNC: 8.9 MG/DL (ref 8.3–10.6)
CHLORIDE BLD-SCNC: 101 MMOL/L (ref 99–110)
CO2: 23 MMOL/L (ref 21–32)
CREAT SERPL-MCNC: 0.6 MG/DL (ref 0.6–1.2)
EOSINOPHILS ABSOLUTE: 0.1 K/UL (ref 0–0.6)
EOSINOPHILS RELATIVE PERCENT: 2.3 %
GFR AFRICAN AMERICAN: >60
GFR NON-AFRICAN AMERICAN: >60
GLOBULIN: 3.6 G/DL
GLUCOSE BLD-MCNC: 95 MG/DL (ref 70–99)
HCT VFR BLD CALC: 37.6 % (ref 36–48)
HEMOGLOBIN: 12.7 G/DL (ref 12–16)
INR BLD: 1.91 (ref 0.86–1.14)
LYMPHOCYTES ABSOLUTE: 0.7 K/UL (ref 1–5.1)
LYMPHOCYTES RELATIVE PERCENT: 13.3 %
MCH RBC QN AUTO: 34.9 PG (ref 26–34)
MCHC RBC AUTO-ENTMCNC: 33.8 G/DL (ref 31–36)
MCV RBC AUTO: 103 FL (ref 80–100)
MONOCYTES ABSOLUTE: 0.3 K/UL (ref 0–1.3)
MONOCYTES RELATIVE PERCENT: 6 %
NEUTROPHILS ABSOLUTE: 4.2 K/UL (ref 1.7–7.7)
NEUTROPHILS RELATIVE PERCENT: 77.9 %
PDW BLD-RTO: 15 % (ref 12.4–15.4)
PLATELET # BLD: 143 K/UL (ref 135–450)
PMV BLD AUTO: 8.7 FL (ref 5–10.5)
POTASSIUM REFLEX MAGNESIUM: 4.2 MMOL/L (ref 3.5–5.1)
PROTHROMBIN TIME: 21.8 SEC (ref 9.8–13)
RBC # BLD: 3.65 M/UL (ref 4–5.2)
SODIUM BLD-SCNC: 136 MMOL/L (ref 136–145)
TOTAL PROTEIN: 7.2 G/DL (ref 6.4–8.2)
WBC # BLD: 5.4 K/UL (ref 4–11)

## 2018-10-29 PROCEDURE — 99284 EMERGENCY DEPT VISIT MOD MDM: CPT

## 2018-10-29 PROCEDURE — 6360000002 HC RX W HCPCS: Performed by: PHYSICIAN ASSISTANT

## 2018-10-29 PROCEDURE — 72148 MRI LUMBAR SPINE W/O DYE: CPT

## 2018-10-29 PROCEDURE — 96374 THER/PROPH/DIAG INJ IV PUSH: CPT

## 2018-10-29 PROCEDURE — 72131 CT LUMBAR SPINE W/O DYE: CPT

## 2018-10-29 PROCEDURE — 6360000002 HC RX W HCPCS: Performed by: INTERNAL MEDICINE

## 2018-10-29 PROCEDURE — 72128 CT CHEST SPINE W/O DYE: CPT

## 2018-10-29 PROCEDURE — 96375 TX/PRO/DX INJ NEW DRUG ADDON: CPT

## 2018-10-29 PROCEDURE — 2580000003 HC RX 258: Performed by: INTERNAL MEDICINE

## 2018-10-29 PROCEDURE — 6370000000 HC RX 637 (ALT 250 FOR IP): Performed by: INTERNAL MEDICINE

## 2018-10-29 PROCEDURE — 80053 COMPREHEN METABOLIC PANEL: CPT

## 2018-10-29 PROCEDURE — 85610 PROTHROMBIN TIME: CPT

## 2018-10-29 PROCEDURE — 6370000000 HC RX 637 (ALT 250 FOR IP): Performed by: PHYSICIAN ASSISTANT

## 2018-10-29 PROCEDURE — 1200000000 HC SEMI PRIVATE

## 2018-10-29 PROCEDURE — 85025 COMPLETE CBC W/AUTO DIFF WBC: CPT

## 2018-10-29 RX ORDER — MAGNESIUM GLUCONATE 27 MG(500)
500 TABLET ORAL NIGHTLY
COMMUNITY
End: 2018-01-01

## 2018-10-29 RX ORDER — ACETAMINOPHEN 325 MG/1
650 TABLET ORAL ONCE
Status: COMPLETED | OUTPATIENT
Start: 2018-10-29 | End: 2018-10-29

## 2018-10-29 RX ORDER — POTASSIUM CHLORIDE 20 MEQ/1
40 TABLET, EXTENDED RELEASE ORAL PRN
Status: DISCONTINUED | OUTPATIENT
Start: 2018-10-29 | End: 2018-10-29 | Stop reason: SDUPTHER

## 2018-10-29 RX ORDER — DIPHENHYDRAMINE HCL 25 MG
25 TABLET ORAL NIGHTLY PRN
COMMUNITY
End: 2018-11-30 | Stop reason: CLARIF

## 2018-10-29 RX ORDER — LIDOCAINE 50 MG/G
1 PATCH TOPICAL DAILY
Status: DISCONTINUED | OUTPATIENT
Start: 2018-10-29 | End: 2018-11-03 | Stop reason: HOSPADM

## 2018-10-29 RX ORDER — FAMOTIDINE 20 MG/1
20 TABLET, FILM COATED ORAL 2 TIMES DAILY PRN
Status: DISCONTINUED | OUTPATIENT
Start: 2018-10-29 | End: 2018-11-03 | Stop reason: HOSPADM

## 2018-10-29 RX ORDER — ONDANSETRON 2 MG/ML
4 INJECTION INTRAMUSCULAR; INTRAVENOUS EVERY 6 HOURS PRN
Status: DISCONTINUED | OUTPATIENT
Start: 2018-10-29 | End: 2018-11-03 | Stop reason: HOSPADM

## 2018-10-29 RX ORDER — MORPHINE SULFATE 4 MG/ML
4 INJECTION, SOLUTION INTRAMUSCULAR; INTRAVENOUS ONCE
Status: COMPLETED | OUTPATIENT
Start: 2018-10-29 | End: 2018-10-29

## 2018-10-29 RX ORDER — SODIUM CHLORIDE 0.9 % (FLUSH) 0.9 %
10 SYRINGE (ML) INJECTION EVERY 12 HOURS SCHEDULED
Status: DISCONTINUED | OUTPATIENT
Start: 2018-10-29 | End: 2018-11-03 | Stop reason: HOSPADM

## 2018-10-29 RX ORDER — FUROSEMIDE 20 MG/1
20 TABLET ORAL SEE ADMIN INSTRUCTIONS
Status: DISCONTINUED | OUTPATIENT
Start: 2018-10-29 | End: 2018-10-29 | Stop reason: ALTCHOICE

## 2018-10-29 RX ORDER — POTASSIUM CHLORIDE 20 MEQ/1
40 TABLET, EXTENDED RELEASE ORAL PRN
Status: DISCONTINUED | OUTPATIENT
Start: 2018-10-29 | End: 2018-11-03 | Stop reason: HOSPADM

## 2018-10-29 RX ORDER — SODIUM CHLORIDE 0.9 % (FLUSH) 0.9 %
10 SYRINGE (ML) INJECTION PRN
Status: DISCONTINUED | OUTPATIENT
Start: 2018-10-29 | End: 2018-11-03 | Stop reason: HOSPADM

## 2018-10-29 RX ORDER — ONDANSETRON 2 MG/ML
4 INJECTION INTRAMUSCULAR; INTRAVENOUS ONCE
Status: COMPLETED | OUTPATIENT
Start: 2018-10-29 | End: 2018-10-29

## 2018-10-29 RX ORDER — ATORVASTATIN CALCIUM 20 MG/1
20 TABLET, FILM COATED ORAL NIGHTLY
Status: DISCONTINUED | OUTPATIENT
Start: 2018-10-29 | End: 2018-11-03 | Stop reason: HOSPADM

## 2018-10-29 RX ORDER — LEVOTHYROXINE SODIUM 88 UG/1
88 TABLET ORAL DAILY
Status: DISCONTINUED | OUTPATIENT
Start: 2018-10-30 | End: 2018-11-03 | Stop reason: HOSPADM

## 2018-10-29 RX ORDER — MORPHINE SULFATE 2 MG/ML
1 INJECTION, SOLUTION INTRAMUSCULAR; INTRAVENOUS EVERY 4 HOURS PRN
Status: DISCONTINUED | OUTPATIENT
Start: 2018-10-29 | End: 2018-11-03 | Stop reason: HOSPADM

## 2018-10-29 RX ORDER — POTASSIUM CHLORIDE 20MEQ/15ML
40 LIQUID (ML) ORAL PRN
Status: DISCONTINUED | OUTPATIENT
Start: 2018-10-29 | End: 2018-10-29 | Stop reason: SDUPTHER

## 2018-10-29 RX ORDER — LEVOTHYROXINE SODIUM 88 UG/1
88 TABLET ORAL DAILY
COMMUNITY
End: 2018-11-30 | Stop reason: DRUGHIGH

## 2018-10-29 RX ORDER — POTASSIUM CHLORIDE 7.45 MG/ML
10 INJECTION INTRAVENOUS PRN
Status: DISCONTINUED | OUTPATIENT
Start: 2018-10-29 | End: 2018-10-29 | Stop reason: SDUPTHER

## 2018-10-29 RX ORDER — SODIUM CHLORIDE 9 MG/ML
INJECTION, SOLUTION INTRAVENOUS CONTINUOUS
Status: DISCONTINUED | OUTPATIENT
Start: 2018-10-29 | End: 2018-11-02

## 2018-10-29 RX ORDER — DILTIAZEM HYDROCHLORIDE 180 MG/1
180 CAPSULE, COATED, EXTENDED RELEASE ORAL DAILY
Status: DISCONTINUED | OUTPATIENT
Start: 2018-10-30 | End: 2018-11-03 | Stop reason: HOSPADM

## 2018-10-29 RX ORDER — POTASSIUM CHLORIDE 7.45 MG/ML
10 INJECTION INTRAVENOUS PRN
Status: DISCONTINUED | OUTPATIENT
Start: 2018-10-29 | End: 2018-11-03 | Stop reason: HOSPADM

## 2018-10-29 RX ORDER — TRAZODONE HYDROCHLORIDE 50 MG/1
100 TABLET ORAL NIGHTLY PRN
Status: DISCONTINUED | OUTPATIENT
Start: 2018-10-29 | End: 2018-11-03 | Stop reason: HOSPADM

## 2018-10-29 RX ORDER — METOPROLOL SUCCINATE 50 MG/1
50 TABLET, EXTENDED RELEASE ORAL DAILY
Status: ON HOLD | COMMUNITY
End: 2018-10-31 | Stop reason: CLARIF

## 2018-10-29 RX ADMIN — TRAZODONE HYDROCHLORIDE 100 MG: 50 TABLET ORAL at 22:23

## 2018-10-29 RX ADMIN — MORPHINE SULFATE 4 MG: 4 INJECTION INTRAVENOUS at 13:14

## 2018-10-29 RX ADMIN — Medication 10 ML: at 19:52

## 2018-10-29 RX ADMIN — ONDANSETRON HYDROCHLORIDE 4 MG: 2 INJECTION, SOLUTION INTRAMUSCULAR; INTRAVENOUS at 19:52

## 2018-10-29 RX ADMIN — SODIUM CHLORIDE: 9 INJECTION, SOLUTION INTRAVENOUS at 17:41

## 2018-10-29 RX ADMIN — RIVAROXABAN 20 MG: 20 TABLET, FILM COATED ORAL at 17:41

## 2018-10-29 RX ADMIN — ONDANSETRON 4 MG: 2 INJECTION INTRAMUSCULAR; INTRAVENOUS at 13:14

## 2018-10-29 RX ADMIN — ATORVASTATIN CALCIUM 20 MG: 20 TABLET, FILM COATED ORAL at 19:52

## 2018-10-29 RX ADMIN — ACETAMINOPHEN 650 MG: 325 TABLET, FILM COATED ORAL at 11:00

## 2018-10-29 ASSESSMENT — PAIN SCALES - GENERAL
PAINLEVEL_OUTOF10: 6
PAINLEVEL_OUTOF10: 7

## 2018-10-29 ASSESSMENT — ENCOUNTER SYMPTOMS
COLOR CHANGE: 0
ABDOMINAL PAIN: 0
PHOTOPHOBIA: 0
SINUS PRESSURE: 0
VOMITING: 0
RECTAL PAIN: 0
EYE DISCHARGE: 0
COUGH: 0
BLOOD IN STOOL: 0
BACK PAIN: 1
SINUS PAIN: 0
SHORTNESS OF BREATH: 0
NAUSEA: 0
APNEA: 0

## 2018-10-29 ASSESSMENT — PAIN DESCRIPTION - PAIN TYPE: TYPE: ACUTE PAIN

## 2018-10-29 ASSESSMENT — PAIN DESCRIPTION - LOCATION: LOCATION: BACK

## 2018-10-29 NOTE — H&P
two midnights for care and treatment of the problems noted in the problem list.  This determination is also based on thepatients comorbidities and past medical history, the severity and timing of the signs and symptoms upon presentation.         Electronically signed by: Ibrahima Banks 10/29/2018

## 2018-10-29 NOTE — PROGRESS NOTES
Shift assessment complete. See flowsheet. VSS. Evening meds given. See MAR. PRN zofran given for nausea. Bed alarm engaged. Call light within reach. Fresh ice water provided. Pt denies other needs at time. Will continue to monitor.

## 2018-10-29 NOTE — ED NOTES
Pt report given to Hope Guerrero RN, states no questions or concerns at this time. Pt transported to floor via bed by floor RN and ED tech.       [address], RN  10/29/18 [id number]

## 2018-10-29 NOTE — ED PROVIDER NOTES
reviewed and were negative at this time or not returned at the time of this note. RADIOLOGY:   Non-plain film images suchas CT, Ultrasound and MRI are read by the radiologist. Carolyn TAI PA have directly visualized the radiologic plain film image(s) with the below findings:  Interpretation per the Radiologist below, if available at the time of this note:    MRI LUMBAR SPINE WO CONTRAST   Preliminary Result   Acute L1 compression fracture. No evidence of cauda equina compression. CT LUMBAR SPINE WO CONTRAST   Final Result   1. Mild acute or subacute compression superior endplate L1 without   retropulsion, fracture line on the right extending to the anterior margin of   the middle column. 2. No acute osseous abnormality evident thoracic spine. 3. Bones appear osteoporotic. 4. Small volume bilateral pleural effusion, right greater than left, with   associated bibasilar relaxation atelectasis. Pneumonitis is a consideration. 5. Mild degenerative changes thoracic and lumbar spine without canal stenosis   or significant neural foraminal narrowing. 6. Mild, bilaterally symmetrical SI joint osteoarthritis. CT THORACIC SPINE WO CONTRAST   Final Result   1. Mild acute or subacute compression superior endplate L1 without   retropulsion, fracture line on the right extending to the anterior margin of   the middle column. 2. No acute osseous abnormality evident thoracic spine. 3. Bones appear osteoporotic. 4. Small volume bilateral pleural effusion, right greater than left, with   associated bibasilar relaxation atelectasis. Pneumonitis is a consideration. 5. Mild degenerative changes thoracic and lumbar spine without canal stenosis   or significant neural foraminal narrowing. 6. Mild, bilaterally symmetrical SI joint osteoarthritis.                 MEDICAL DECISION MAKING / ED COURSE:      PROCEDURES:   Procedures    Patient was given:  Medications   lidocaine (LIDODERM) 5 % 1 and lumbar spine, she does have evidence of a mild acute or subacute compression superior endplate at L1 without retropulsion. Fracture line on the right extending into the anterior margin of the middle call him. Patient did report having a loss of bowel control but is also had this a few weeks ago as well. Patient yesterday spent all day in bed. She does have family at home, her  however he has dementia and does require 24-hour care. With patient's imaging studies, loss of bowel control we do believe patient requires further evaluation on inpatient basis. We'll obtain MRI imaging for evaluation of any cord compression. Blood work obtained. Pending MRI Dr. Cheri Betancourt will be consulted for admission. MRI of the patient's lumbar spine shows an acute L1 compression fracture, no evidence of cauda equina. The patient tolerated their visit well. I have discussed the findings of today's workup with the patient and addressed the patient's questions and concerns. Attending physician, Dr. Coral Ascencio also independently evaluated patient and agreeable to plan of care. CLINICAL IMPRESSION:  1. Closed compression fracture of first lumbar vertebra, initial encounter ([facility] [address]    2. Fall from slip, trip, or stumble, initial encounter        DISPOSITION  ADMIT      PATIENT REFERRED TO:  No follow-up provider specified.     DISCHARGE MEDICATIONS:  New Prescriptions    No medications on file              (Please note the MDM and HPI sections of this note were completed with avoice recognition program.  Efforts were made to edit the dictations but occasionally words are mis-transcribed.)    Electronically signed, CARLITO Mcmahon,             CARLITO Guerra  10/29/18 [id number]

## 2018-10-30 ENCOUNTER — NURSE ONLY (OUTPATIENT)
Dept: CARDIOLOGY CLINIC | Age: 83
End: 2018-10-30
Payer: MEDICARE

## 2018-10-30 DIAGNOSIS — R55 SYNCOPE AND COLLAPSE: ICD-10-CM

## 2018-10-30 DIAGNOSIS — Z45.09 ENCOUNTER FOR LOOP RECORDER CHECK: ICD-10-CM

## 2018-10-30 LAB
ANION GAP SERPL CALCULATED.3IONS-SCNC: 10 MMOL/L (ref 3–16)
BACTERIA: ABNORMAL /HPF
BASOPHILS ABSOLUTE: 0 K/UL (ref 0–0.2)
BASOPHILS RELATIVE PERCENT: 0.4 %
BILIRUBIN URINE: NEGATIVE
BLOOD, URINE: NEGATIVE
BUN BLDV-MCNC: 14 MG/DL (ref 7–20)
CALCIUM SERPL-MCNC: 8.4 MG/DL (ref 8.3–10.6)
CHLORIDE BLD-SCNC: 107 MMOL/L (ref 99–110)
CLARITY: ABNORMAL
CO2: 22 MMOL/L (ref 21–32)
COLOR: YELLOW
CREAT SERPL-MCNC: 0.6 MG/DL (ref 0.6–1.2)
EOSINOPHILS ABSOLUTE: 0.2 K/UL (ref 0–0.6)
EOSINOPHILS RELATIVE PERCENT: 2.9 %
EPITHELIAL CELLS, UA: 13 /HPF (ref 0–5)
GFR AFRICAN AMERICAN: >60
GFR NON-AFRICAN AMERICAN: >60
GLUCOSE BLD-MCNC: 105 MG/DL (ref 70–99)
GLUCOSE URINE: NEGATIVE MG/DL
HCT VFR BLD CALC: 34.9 % (ref 36–48)
HEMOGLOBIN: 11.7 G/DL (ref 12–16)
HYALINE CASTS: 4 /LPF (ref 0–8)
KETONES, URINE: NEGATIVE MG/DL
LEUKOCYTE ESTERASE, URINE: NEGATIVE
LYMPHOCYTES ABSOLUTE: 0.9 K/UL (ref 1–5.1)
LYMPHOCYTES RELATIVE PERCENT: 16.3 %
MCH RBC QN AUTO: 34.2 PG (ref 26–34)
MCHC RBC AUTO-ENTMCNC: 33.4 G/DL (ref 31–36)
MCV RBC AUTO: 102.4 FL (ref 80–100)
MICROSCOPIC EXAMINATION: YES
MONOCYTES ABSOLUTE: 0.4 K/UL (ref 0–1.3)
MONOCYTES RELATIVE PERCENT: 6.6 %
NEUTROPHILS ABSOLUTE: 4 K/UL (ref 1.7–7.7)
NEUTROPHILS RELATIVE PERCENT: 73.8 %
NITRITE, URINE: NEGATIVE
PDW BLD-RTO: 14.8 % (ref 12.4–15.4)
PH UA: 5.5
PLATELET # BLD: 139 K/UL (ref 135–450)
PMV BLD AUTO: 8.8 FL (ref 5–10.5)
POTASSIUM SERPL-SCNC: 4.1 MMOL/L (ref 3.5–5.1)
PROTEIN UA: NEGATIVE MG/DL
RBC # BLD: 3.41 M/UL (ref 4–5.2)
RBC UA: 3 /HPF (ref 0–4)
SODIUM BLD-SCNC: 139 MMOL/L (ref 136–145)
SPECIFIC GRAVITY UA: 1.02
URINE REFLEX TO CULTURE: ABNORMAL
URINE TYPE: ABNORMAL
UROBILINOGEN, URINE: 0.2 E.U./DL
WBC # BLD: 5.4 K/UL (ref 4–11)
WBC UA: 3 /HPF (ref 0–5)

## 2018-10-30 PROCEDURE — G8987 SELF CARE CURRENT STATUS: HCPCS

## 2018-10-30 PROCEDURE — 97530 THERAPEUTIC ACTIVITIES: CPT

## 2018-10-30 PROCEDURE — 93298 REM INTERROG DEV EVAL SCRMS: CPT | Performed by: INTERNAL MEDICINE

## 2018-10-30 PROCEDURE — 80048 BASIC METABOLIC PNL TOTAL CA: CPT

## 2018-10-30 PROCEDURE — 85025 COMPLETE CBC W/AUTO DIFF WBC: CPT

## 2018-10-30 PROCEDURE — 1200000000 HC SEMI PRIVATE

## 2018-10-30 PROCEDURE — 97165 OT EVAL LOW COMPLEX 30 MIN: CPT

## 2018-10-30 PROCEDURE — G8979 MOBILITY GOAL STATUS: HCPCS

## 2018-10-30 PROCEDURE — 6370000000 HC RX 637 (ALT 250 FOR IP): Performed by: INTERNAL MEDICINE

## 2018-10-30 PROCEDURE — 81001 URINALYSIS AUTO W/SCOPE: CPT

## 2018-10-30 PROCEDURE — 93299 PR REM INTERROG ICPMS/SCRMS <30 D TECH REVIEW: CPT | Performed by: INTERNAL MEDICINE

## 2018-10-30 PROCEDURE — 97535 SELF CARE MNGMENT TRAINING: CPT

## 2018-10-30 PROCEDURE — G8988 SELF CARE GOAL STATUS: HCPCS

## 2018-10-30 PROCEDURE — 97162 PT EVAL MOD COMPLEX 30 MIN: CPT

## 2018-10-30 PROCEDURE — 36415 COLL VENOUS BLD VENIPUNCTURE: CPT

## 2018-10-30 PROCEDURE — G8978 MOBILITY CURRENT STATUS: HCPCS

## 2018-10-30 PROCEDURE — 6370000000 HC RX 637 (ALT 250 FOR IP): Performed by: PHYSICIAN ASSISTANT

## 2018-10-30 PROCEDURE — 6360000002 HC RX W HCPCS: Performed by: INTERNAL MEDICINE

## 2018-10-30 PROCEDURE — 2580000003 HC RX 258: Performed by: INTERNAL MEDICINE

## 2018-10-30 RX ADMIN — SODIUM CHLORIDE: 9 INJECTION, SOLUTION INTRAVENOUS at 06:21

## 2018-10-30 RX ADMIN — MORPHINE SULFATE 1 MG: 2 INJECTION, SOLUTION INTRAMUSCULAR; INTRAVENOUS at 08:09

## 2018-10-30 RX ADMIN — ONDANSETRON HYDROCHLORIDE 4 MG: 2 INJECTION, SOLUTION INTRAMUSCULAR; INTRAVENOUS at 08:09

## 2018-10-30 RX ADMIN — ONDANSETRON HYDROCHLORIDE 4 MG: 2 INJECTION, SOLUTION INTRAMUSCULAR; INTRAVENOUS at 02:56

## 2018-10-30 RX ADMIN — ATORVASTATIN CALCIUM 20 MG: 20 TABLET, FILM COATED ORAL at 20:04

## 2018-10-30 RX ADMIN — Medication 10 ML: at 20:04

## 2018-10-30 RX ADMIN — VITAMIN D, TAB 1000IU (100/BT) 2000 UNITS: 25 TAB at 08:09

## 2018-10-30 RX ADMIN — ONDANSETRON HYDROCHLORIDE 4 MG: 2 INJECTION, SOLUTION INTRAMUSCULAR; INTRAVENOUS at 14:24

## 2018-10-30 RX ADMIN — SODIUM CHLORIDE: 9 INJECTION, SOLUTION INTRAVENOUS at 19:39

## 2018-10-30 RX ADMIN — MORPHINE SULFATE 1 MG: 2 INJECTION, SOLUTION INTRAMUSCULAR; INTRAVENOUS at 02:56

## 2018-10-30 RX ADMIN — Medication 10 ML: at 08:11

## 2018-10-30 RX ADMIN — DILTIAZEM HYDROCHLORIDE 180 MG: 180 CAPSULE, COATED, EXTENDED RELEASE ORAL at 08:09

## 2018-10-30 RX ADMIN — TRAZODONE HYDROCHLORIDE 100 MG: 50 TABLET ORAL at 20:09

## 2018-10-30 RX ADMIN — MORPHINE SULFATE 1 MG: 2 INJECTION, SOLUTION INTRAMUSCULAR; INTRAVENOUS at 14:20

## 2018-10-30 ASSESSMENT — PAIN DESCRIPTION - LOCATION
LOCATION: BACK
LOCATION: BACK

## 2018-10-30 ASSESSMENT — PAIN SCALES - GENERAL
PAINLEVEL_OUTOF10: 8
PAINLEVEL_OUTOF10: 0

## 2018-10-30 ASSESSMENT — PAIN DESCRIPTION - ORIENTATION: ORIENTATION: LOWER

## 2018-10-30 ASSESSMENT — PAIN DESCRIPTION - PAIN TYPE: TYPE: ACUTE PAIN

## 2018-10-30 NOTE — PROGRESS NOTES
Good  Decision Making: Medium Complexity  History: Social situation  Clinical Presentation: Evolving  Patient Education: PT eval, POC, discharge recommendations, log rolling, spinal precautions of bending, lifting, twisting  Barriers to Learning: cognitive (per family report)  REQUIRES PT FOLLOW UP: Yes  Activity Tolerance  Activity Tolerance: Patient limited by pain; Patient Tolerated treatment well  Activity Tolerance: Limited by back pain; light-headed and SOB upon sitting on EOB after bed mobility with SpO2 of 88%, with rest and deep breathing went up to 94%         Plan   Plan  Times per week: 7  Times per day: Daily  Current Treatment Recommendations: Strengthening, Functional Mobility Training, Transfer Training, Gait Training, Stair training, Endurance Training, Balance Training, Safety Education & Training, Pain Management  Safety Devices  Type of devices: All fall risk precautions in place, Call light within reach, Chair alarm in place, Gait belt, Patient at risk for falls, Left in chair, Sitter present, Nurse notified  Restraints  Initially in place: No    G-Code  PT G-Codes  Functional Limitation: Mobility: Walking and moving around  Mobility: Walking and Moving Around Current Status (): At least 40 percent but less than 60 percent impaired, limited or restricted  Mobility: Walking and Moving Around Goal Status (): At least 1 percent but less than 20 percent impaired, limited or restricted    AM-PAC Score  AM-PAC Inpatient Mobility Raw Score : 15  AM-PAC Inpatient T-Scale Score : 39.45  Mobility Inpatient CMS 0-100% Score: 57.7  Mobility Inpatient CMS G-Code Modifier : CK     Goals  Short term goals  Time Frame for Short term goals: Discharge  Short term goal 1: Pt will perform bed mobility with min A. Short term goal 2: Pt will perform sit to/from stand with SBA. Short term goal 3: Pt will amb 27' with RW with SBA.    Patient Goals   Patient goals : Return home and improve pain; family stated

## 2018-10-30 NOTE — PROGRESS NOTES
toilet; pt w/c/o lightheadedness)  Standing Balance: Contact guard assistance (w/RW)  Standing Balance  Time: ~1 min, ~2-3 min  Activity: functional mobility to toilet & clothing mgt; toileting, grooming at sink, mobility to recliner  Sit to stand: Contact guard assistance (from EOB, from [facility] over toilet)  Stand to sit: Contact guard assistance (to [facility] over toilet, to recliner)  Comment: No LOB, verbal cues to bring RW up to sink  Functional Mobility  Functional - Mobility Device: Rolling Walker  Activity: To/from bathroom  Assist Level: Contact guard assistance  Toilet Transfers  Toilet - Technique: Ambulating  Equipment Used: Standard bedside commode (over toilet)  ADL  Grooming: Stand by assistance;Setup (brush teeth, wash face)  UE Bathing: Setup;Stand by assistance (seated on BSC over toilet)  LE Bathing: Contact guard assistance (nabeel hygiene only in stance)  UE Dressing: Moderate assistance (gown)  LE Dressing: Maximum assistance (Mod A depends pull-up, dependent socks)  Toileting: Contact guard assistance  Tone RUE  RUE Tone: Normotonic  Tone LUE  LUE Tone: Normotonic  Coordination  Movements Are Fluid And Coordinated: Yes     Bed mobility  Supine to Sit: Moderate assistance (HOB elevated, used rail, log roll technique)  Scooting: Contact guard assistance  Transfers  Stand Step Transfers: Contact guard assistance (w/RW)  Sit to stand: Contact guard assistance (from EOB, from [facility] over toilet)  Stand to sit: Contact guard assistance (to [facility] over toilet, to recliner)  Transfer Comments: Verbal cues for hand placement, sit to stand, stand to sit. Vision - Basic Assessment  Prior Vision: Wears glasses all the time  Visual History: Cataracts (bilat cataract removal)  Cognition  Overall Cognitive Status: WFL  Safety Judgement: Decreased awareness of need for safety  Insights: Fully aware of deficits  Cognition Comment: Pt slightly impulsive.   Perception  Overall Perceptual Status: WFL     Sensation  Overall

## 2018-10-30 NOTE — PROGRESS NOTES
Pain medication and antiemetic administered per pt request. Will continue to monitor. Daughter at bedside.

## 2018-10-30 NOTE — PROGRESS NOTES
Pt requesting medication for sleep. Called and spoke with Dr. Andrew Roth. Trazodone 100mg ordered nightly PRN.

## 2018-10-31 ENCOUNTER — APPOINTMENT (OUTPATIENT)
Dept: GENERAL RADIOLOGY | Age: 83
DRG: 517 | End: 2018-10-31
Payer: MEDICARE

## 2018-10-31 ENCOUNTER — APPOINTMENT (OUTPATIENT)
Dept: INTERVENTIONAL RADIOLOGY/VASCULAR | Age: 83
DRG: 517 | End: 2018-10-31
Payer: MEDICARE

## 2018-10-31 LAB
ANION GAP SERPL CALCULATED.3IONS-SCNC: 8 MMOL/L (ref 3–16)
BASOPHILS ABSOLUTE: 0 K/UL (ref 0–0.2)
BASOPHILS RELATIVE PERCENT: 0.7 %
BUN BLDV-MCNC: 11 MG/DL (ref 7–20)
CALCIUM SERPL-MCNC: 8 MG/DL (ref 8.3–10.6)
CHLORIDE BLD-SCNC: 107 MMOL/L (ref 99–110)
CO2: 25 MMOL/L (ref 21–32)
CREAT SERPL-MCNC: 0.6 MG/DL (ref 0.6–1.2)
EOSINOPHILS ABSOLUTE: 0.2 K/UL (ref 0–0.6)
EOSINOPHILS RELATIVE PERCENT: 3.6 %
GFR AFRICAN AMERICAN: >60
GFR NON-AFRICAN AMERICAN: >60
GLUCOSE BLD-MCNC: 101 MG/DL (ref 70–99)
HCT VFR BLD CALC: 31.5 % (ref 36–48)
HEMOGLOBIN: 10.8 G/DL (ref 12–16)
LYMPHOCYTES ABSOLUTE: 0.8 K/UL (ref 1–5.1)
LYMPHOCYTES RELATIVE PERCENT: 17.9 %
MCH RBC QN AUTO: 35 PG (ref 26–34)
MCHC RBC AUTO-ENTMCNC: 34.2 G/DL (ref 31–36)
MCV RBC AUTO: 102.5 FL (ref 80–100)
MONOCYTES ABSOLUTE: 0.5 K/UL (ref 0–1.3)
MONOCYTES RELATIVE PERCENT: 10.6 %
NEUTROPHILS ABSOLUTE: 3.1 K/UL (ref 1.7–7.7)
NEUTROPHILS RELATIVE PERCENT: 67.2 %
PDW BLD-RTO: 14.5 % (ref 12.4–15.4)
PLATELET # BLD: 126 K/UL (ref 135–450)
PMV BLD AUTO: 9 FL (ref 5–10.5)
POTASSIUM SERPL-SCNC: 4 MMOL/L (ref 3.5–5.1)
RBC # BLD: 3.07 M/UL (ref 4–5.2)
SODIUM BLD-SCNC: 140 MMOL/L (ref 136–145)
T4 FREE: 0.8 NG/DL (ref 0.9–1.8)
TSH REFLEX: 6.77 UIU/ML (ref 0.27–4.2)
WBC # BLD: 4.5 K/UL (ref 4–11)

## 2018-10-31 PROCEDURE — 73070 X-RAY EXAM OF ELBOW: CPT

## 2018-10-31 PROCEDURE — 36415 COLL VENOUS BLD VENIPUNCTURE: CPT

## 2018-10-31 PROCEDURE — 6370000000 HC RX 637 (ALT 250 FOR IP): Performed by: RADIOLOGY

## 2018-10-31 PROCEDURE — 1200000000 HC SEMI PRIVATE

## 2018-10-31 PROCEDURE — 93005 ELECTROCARDIOGRAM TRACING: CPT | Performed by: INTERNAL MEDICINE

## 2018-10-31 PROCEDURE — 2580000003 HC RX 258: Performed by: INTERNAL MEDICINE

## 2018-10-31 PROCEDURE — 80048 BASIC METABOLIC PNL TOTAL CA: CPT

## 2018-10-31 PROCEDURE — 6360000002 HC RX W HCPCS: Performed by: INTERNAL MEDICINE

## 2018-10-31 PROCEDURE — 73090 X-RAY EXAM OF FOREARM: CPT

## 2018-10-31 PROCEDURE — 85025 COMPLETE CBC W/AUTO DIFF WBC: CPT

## 2018-10-31 PROCEDURE — 93010 ELECTROCARDIOGRAM REPORT: CPT | Performed by: INTERNAL MEDICINE

## 2018-10-31 PROCEDURE — 84439 ASSAY OF FREE THYROXINE: CPT

## 2018-10-31 PROCEDURE — 6360000002 HC RX W HCPCS: Performed by: RADIOLOGY

## 2018-10-31 PROCEDURE — 6370000000 HC RX 637 (ALT 250 FOR IP): Performed by: INTERNAL MEDICINE

## 2018-10-31 PROCEDURE — 6370000000 HC RX 637 (ALT 250 FOR IP): Performed by: PHYSICIAN ASSISTANT

## 2018-10-31 PROCEDURE — 84443 ASSAY THYROID STIM HORMONE: CPT

## 2018-10-31 PROCEDURE — 73060 X-RAY EXAM OF HUMERUS: CPT

## 2018-10-31 RX ORDER — MIDAZOLAM HYDROCHLORIDE 1 MG/ML
INJECTION INTRAMUSCULAR; INTRAVENOUS
Status: COMPLETED | OUTPATIENT
Start: 2018-10-31 | End: 2018-10-31

## 2018-10-31 RX ORDER — NALOXONE HYDROCHLORIDE 0.4 MG/ML
INJECTION, SOLUTION INTRAMUSCULAR; INTRAVENOUS; SUBCUTANEOUS
Status: COMPLETED | OUTPATIENT
Start: 2018-10-31 | End: 2018-10-31

## 2018-10-31 RX ORDER — METOPROLOL SUCCINATE 50 MG/1
50 TABLET, EXTENDED RELEASE ORAL DAILY
Status: DISCONTINUED | OUTPATIENT
Start: 2018-10-31 | End: 2018-10-31

## 2018-10-31 RX ORDER — DIPHENHYDRAMINE HCL 25 MG
TABLET ORAL
Status: COMPLETED | OUTPATIENT
Start: 2018-10-31 | End: 2018-10-31

## 2018-10-31 RX ORDER — FENTANYL CITRATE 50 UG/ML
INJECTION, SOLUTION INTRAMUSCULAR; INTRAVENOUS
Status: COMPLETED | OUTPATIENT
Start: 2018-10-31 | End: 2018-10-31

## 2018-10-31 RX ORDER — LOSARTAN POTASSIUM 25 MG/1
50 TABLET ORAL DAILY
Status: DISCONTINUED | OUTPATIENT
Start: 2018-10-31 | End: 2018-11-03 | Stop reason: HOSPADM

## 2018-10-31 RX ORDER — LOSARTAN POTASSIUM 50 MG/1
50 TABLET ORAL DAILY
COMMUNITY
End: 2019-01-01

## 2018-10-31 RX ADMIN — NALOXONE HYDROCHLORIDE 0.4 MG: 0.4 INJECTION, SOLUTION INTRAMUSCULAR; INTRAVENOUS; SUBCUTANEOUS at 11:42

## 2018-10-31 RX ADMIN — ATORVASTATIN CALCIUM 20 MG: 20 TABLET, FILM COATED ORAL at 19:53

## 2018-10-31 RX ADMIN — METOPROLOL SUCCINATE 50 MG: 50 TABLET, FILM COATED, EXTENDED RELEASE ORAL at 17:16

## 2018-10-31 RX ADMIN — LOSARTAN POTASSIUM 50 MG: 25 TABLET ORAL at 17:43

## 2018-10-31 RX ADMIN — SODIUM CHLORIDE: 9 INJECTION, SOLUTION INTRAVENOUS at 07:35

## 2018-10-31 RX ADMIN — MORPHINE SULFATE 1 MG: 2 INJECTION, SOLUTION INTRAMUSCULAR; INTRAVENOUS at 19:53

## 2018-10-31 RX ADMIN — ONDANSETRON HYDROCHLORIDE 4 MG: 2 INJECTION, SOLUTION INTRAMUSCULAR; INTRAVENOUS at 19:53

## 2018-10-31 RX ADMIN — MIDAZOLAM HYDROCHLORIDE 1 MG: 1 INJECTION INTRAMUSCULAR; INTRAVENOUS at 11:36

## 2018-10-31 RX ADMIN — Medication 10 ML: at 19:53

## 2018-10-31 RX ADMIN — FENTANYL CITRATE 50 MCG: 50 INJECTION, SOLUTION INTRAMUSCULAR; INTRAVENOUS at 11:36

## 2018-10-31 RX ADMIN — MORPHINE SULFATE 1 MG: 2 INJECTION, SOLUTION INTRAMUSCULAR; INTRAVENOUS at 07:48

## 2018-10-31 RX ADMIN — MAGNESIUM HYDROXIDE 30 ML: 400 SUSPENSION ORAL at 19:54

## 2018-10-31 RX ADMIN — DILTIAZEM HYDROCHLORIDE 180 MG: 180 CAPSULE, COATED, EXTENDED RELEASE ORAL at 17:41

## 2018-10-31 RX ADMIN — TRAZODONE HYDROCHLORIDE 100 MG: 50 TABLET ORAL at 23:57

## 2018-10-31 RX ADMIN — NALOXONE HYDROCHLORIDE 0.4 MG: 0.4 INJECTION, SOLUTION INTRAMUSCULAR; INTRAVENOUS; SUBCUTANEOUS at 11:39

## 2018-10-31 RX ADMIN — MORPHINE SULFATE 1 MG: 2 INJECTION, SOLUTION INTRAMUSCULAR; INTRAVENOUS at 00:19

## 2018-10-31 RX ADMIN — DIPHENHYDRAMINE HCL 25 MG: 25 TABLET ORAL at 11:36

## 2018-10-31 RX ADMIN — SODIUM CHLORIDE: 9 INJECTION, SOLUTION INTRAVENOUS at 20:20

## 2018-10-31 ASSESSMENT — PAIN DESCRIPTION - PAIN TYPE
TYPE: ACUTE PAIN

## 2018-10-31 ASSESSMENT — PAIN SCALES - GENERAL
PAINLEVEL_OUTOF10: 3
PAINLEVEL_OUTOF10: 0
PAINLEVEL_OUTOF10: 6
PAINLEVEL_OUTOF10: 7
PAINLEVEL_OUTOF10: 6

## 2018-10-31 ASSESSMENT — PAIN DESCRIPTION - LOCATION
LOCATION: BACK

## 2018-10-31 ASSESSMENT — PAIN DESCRIPTION - ORIENTATION
ORIENTATION: LOWER

## 2018-10-31 NOTE — PROGRESS NOTES
encounter [I94.945E] 10/29/2018    Atrial fibrillation ([facility] [address] [I48.91]     Hypothyroid [E03.9] 09/26/2012       Current Facility-Administered Medications: lidocaine (LIDODERM) 5 % 1 patch, 1 patch, Transdermal, Daily  levothyroxine (SYNTHROID) tablet 88 mcg, 88 mcg, Oral, Daily  vitamin D (CHOLECALCIFEROL) tablet 2,000 Units, 2,000 Units, Oral, Daily  diltiazem (CARDIZEM CD) extended release capsule 180 mg, 180 mg, Oral, Daily  atorvastatin (LIPITOR) tablet 20 mg, 20 mg, Oral, Nightly  0.9 % sodium chloride infusion, , Intravenous, Continuous  sodium chloride flush 0.9 % injection 10 mL, 10 mL, Intravenous, 2 times per day  sodium chloride flush 0.9 % injection 10 mL, 10 mL, Intravenous, PRN  magnesium hydroxide (MILK OF MAGNESIA) 400 MG/5ML suspension 30 mL, 30 mL, Oral, Daily PRN  ondansetron (ZOFRAN) injection 4 mg, 4 mg, Intravenous, Q6H PRN  famotidine (PEPCID) tablet 20 mg, 20 mg, Oral, BID PRN  potassium chloride (KLOR-CON M) extended release tablet 40 mEq, 40 mEq, Oral, PRN **OR** potassium bicarb-citric acid (EFFER-K) effervescent tablet 40 mEq, 40 mEq, Oral, PRN **OR** potassium chloride 10 mEq/100 mL IVPB (Peripheral Line), 10 mEq, Intravenous, PRN  morphine injection 1 mg, 1 mg, Intravenous, Q4H PRN  pneumococcal 13-valent conjugate (PREVNAR) injection 0.5 mL, 0.5 mL, Intramuscular, Once  traZODone (DESYREL) tablet 100 mg, 100 mg, Oral, Nightly PRN         Objective:  /70   Pulse 87   Temp 98.8 °F (37.1 °C) (Oral)   Resp 16   Ht 5' 9\" (1.753 m)   Wt 162 lb (73.5 kg)   SpO2 93%   BMI 23.92 kg/m²      Patient Vitals for the past 24 hrs:   BP Temp Temp src Pulse Resp SpO2   10/31/18 0730 110/70 98.8 °F (37.1 °C) Oral 87 16 93 %   10/31/18 0612 106/67 98.2 °F (36.8 °C) Oral 88 18 92 %   10/31/18 0033 109/63 97.8 °F (36.6 °C) Oral 80 18 94 %   10/30/18 1959 115/73 98 °F (36.7 °C) Oral 87 18 93 %   10/30/18 1744 113/73 97.8 °F (36.6 °C) Oral 89 18 93 %   10/30/18 1740 - - - - - (!) 86 % Patient Vitals for the past 96 hrs (Last 3 readings):   Weight   10/29/18 1039 162 lb (73.5 kg)           Intake/Output Summary (Last 24 hours) at 10/31/18 0820  Last data filed at 10/31/18 0615   Gross per 24 hour   Intake          1756.16 ml   Output              175 ml   Net          1581.16 ml         Physical Exam:   irregular rate and rhythm  clear to auscultation bilaterally  abdomen is soft without significant tenderness, masses, organomegaly or guarding  extremities normal, atraumatic, no cyanosis or edema    Labs:  Lab Results   Component Value Date    WBC 4.5 10/31/2018    HGB 10.8 (L) 10/31/2018    HCT 31.5 (L) 10/31/2018     (L) 10/31/2018    ALT 15 10/29/2018    AST 24 10/29/2018     10/31/2018    K 4.0 10/31/2018     10/31/2018    CREATININE 0.6 10/31/2018    BUN 11 10/31/2018    CO2 25 10/31/2018    TSH 2.54 02/08/2015    INR 1.91 (H) 10/29/2018    LABMICR YES 10/30/2018     Lab Results   Component Value Date    TROPONINI <0.01 03/20/2016       Recent Imaging Results are Reviewed:  Ct Thoracic Spine Wo Contrast    Result Date: 10/29/2018  EXAMINATION: CT OF THE THORACIC SPINE WITHOUT CONTRAST; CT OF THE LUMBAR SPINE WITHOUT CONTRAST  10/29/2018 11:20 am: TECHNIQUE: CT of the thoracic spine was performed without the administration of intravenous contrast. Multiplanar reformatted images are provided for review. Dose modulation, iterative reconstruction, and/or weight based adjustment of the mA/kV was utilized to reduce the radiation dose to as low as reasonably achievable.; CT of the lumbar spine was performed without the administration of intravenous contrast. Multiplanar reformatted images are provided for review. Dose modulation, iterative reconstruction, and/or weight based adjustment of the mA/kV was utilized to reduce the radiation dose to as low as reasonably achievable. COMPARISON: None.  HISTORY: ORDERING SYSTEM PROVIDED HISTORY: fall midline pain TECHNOLOGIST PROVIDED HISTORY: Ordering Physician Provided Reason for Exam: fall; fall on concrete midline pain Acuity: Acute Type of Exam: Initial FINDINGS: BONES/ALIGNMENT: Bone mineralization appears mildly abnormally low. There is normal alignment of the thoracic and lumbar spine. Mild acute or subacute compression superior endplate L1 without retropulsion, fracture line on the right extending to the anterior margin of the middle column. The thoracic vertebral body heights are maintained. No osseous destructive lesion is seen. DEGENERATIVE CHANGES: No spinal canal stenosis or bony neural foraminal narrowing of the thoracic or lumbar spine. Mild degenerative disc disease predominates midthoracic spine. Prominent Schmorl's node superior endplate L2. Mild, bilaterally symmetrical SI joint osteoarthritis. SOFT TISSUES: No paraspinal mass is seen. Small volume bilateral pleural effusion with bibasilar relaxation atelectasis, right greater than left. Small hiatal hernia. Calcific atherosclerotic disease aorta. 1. Mild acute or subacute compression superior endplate L1 without retropulsion, fracture line on the right extending to the anterior margin of the middle column. 2. No acute osseous abnormality evident thoracic spine. 3. Bones appear osteoporotic. 4. Small volume bilateral pleural effusion, right greater than left, with associated bibasilar relaxation atelectasis. Pneumonitis is a consideration. 5. Mild degenerative changes thoracic and lumbar spine without canal stenosis or significant neural foraminal narrowing. 6. Mild, bilaterally symmetrical SI joint osteoarthritis. Ct Lumbar Spine Wo Contrast    Result Date: 10/29/2018  EXAMINATION: CT OF THE THORACIC SPINE WITHOUT CONTRAST; CT OF THE LUMBAR SPINE WITHOUT CONTRAST  10/29/2018 11:20 am: TECHNIQUE: CT of the thoracic spine was performed without the administration of intravenous contrast. Multiplanar reformatted images are provided for review.  Dose modulation,

## 2018-10-31 NOTE — PLAN OF CARE
Problem: Pain:  Goal: Control of acute pain  Control of acute pain   Outcome: Ongoing  Patient instructed on the use of the 0-10 pain scale. Patient will use this scale to report the level of pain. Pain is being controlled at this time.

## 2018-10-31 NOTE — FLOWSHEET NOTE
10/31/18 1255   Encounter Summary   Services provided to: Family   Referral/Consult From: [facility] Visiting (AD discussion w/pt's daughter 10/31 CL)   Complexity of Encounter Moderate   Length of Encounter 15 minutes   Spiritual/Gnosticist   Type Spiritual support   Assessment Calm; Approachable   Intervention Active listening;Explored feelings, thoughts, concerns   Outcome Engaged in conversation   Advance Directives (For Healthcare)   Healthcare Directive Yes, patient has an advance directive for healthcare treatment   Type of Healthcare Directive Durable power of  for health care;Living will   Copy in Chart Yes, copy in chart  (copy in Epic under code status button)   Chart Copy Status : [id number] Appointed Adult [facility] [address] [place] [address] Agent's Name Gay Taylor  (alt: Liliana Dias)   Healthcare Agent's Phone Number [phone]  Jeff Acosta [phone])   Electronically signed by Teagan Martinez on 10/31/2018 at 12:58 PM

## 2018-10-31 NOTE — CONSULTS
hypertrophy cause mild thecal   sac and mild bilateral foraminal stenosis.       L5-S1: Disc bulge, facet and ligament flavum hypertrophy cause mild bilateral   foraminal stenosis.         Impression   Acute L1 compression fracture.       No evidence of cauda equina compression. EXAMINATION:   CT OF THE THORACIC SPINE WITHOUT CONTRAST; CT OF THE LUMBAR SPINE WITHOUT   CONTRAST  10/29/2018 11:20 am:       TECHNIQUE:   CT of the thoracic spine was performed without the administration of   intravenous contrast. Multiplanar reformatted images are provided for review. Dose modulation, iterative reconstruction, and/or weight based adjustment of   the mA/kV was utilized to reduce the radiation dose to as low as reasonably   achievable.; CT of the lumbar spine was performed without the administration   of intravenous contrast. Multiplanar reformatted images are provided for   review. Dose modulation, iterative reconstruction, and/or weight based   adjustment of the mA/kV was utilized to reduce the radiation dose to as low   as reasonably achievable.       COMPARISON:   None.       HISTORY:   ORDERING SYSTEM PROVIDED HISTORY: fall midline pain   TECHNOLOGIST PROVIDED HISTORY:   Ordering Physician Provided Reason for Exam: fall; fall on concrete midline   pain   Acuity: Acute   Type of Exam: Initial       FINDINGS:   BONES/ALIGNMENT: Bone mineralization appears mildly abnormally low.  There is   normal alignment of the thoracic and lumbar spine.  Mild acute or subacute   compression superior endplate L1 without retropulsion, fracture line on the   right extending to the anterior margin of the middle column.  The thoracic   vertebral body heights are maintained.  No osseous destructive lesion is seen.       DEGENERATIVE CHANGES: No spinal canal stenosis or bony neural foraminal   narrowing of the thoracic or lumbar spine.  Mild degenerative disc disease   predominates midthoracic spine.  Prominent Schmorl's node

## 2018-10-31 NOTE — PROGRESS NOTES
Shift assessment complete. See flowsheet. VSS. Evening meds given. See MAR. PRN trazodone given. Pt resting in bed. Call light within reach. Bed alarm engaged. Pt denies other needs at time. Will continue to monitor.

## 2018-10-31 NOTE — FLOWSHEET NOTE
Pt  With desat to 80s at start of procedure, pillow taken away and O2 increased. Mask applied at 10 lpm, Narcan given x2. IV opened. Pt rolled back to bed and  increase In sao2 to 90s and breathing comfortably, report called to bedside RN.

## 2018-11-01 ENCOUNTER — APPOINTMENT (OUTPATIENT)
Dept: INTERVENTIONAL RADIOLOGY/VASCULAR | Age: 83
DRG: 517 | End: 2018-11-01
Payer: MEDICARE

## 2018-11-01 ENCOUNTER — ANESTHESIA (OUTPATIENT)
Dept: INTERVENTIONAL RADIOLOGY/VASCULAR | Age: 83
DRG: 517 | End: 2018-11-01
Payer: MEDICARE

## 2018-11-01 ENCOUNTER — ANESTHESIA EVENT (OUTPATIENT)
Dept: INTERVENTIONAL RADIOLOGY/VASCULAR | Age: 83
DRG: 517 | End: 2018-11-01
Payer: MEDICARE

## 2018-11-01 VITALS
OXYGEN SATURATION: 97 % | DIASTOLIC BLOOD PRESSURE: 77 MMHG | SYSTOLIC BLOOD PRESSURE: 176 MMHG | RESPIRATION RATE: 16 BRPM

## 2018-11-01 LAB
ANION GAP SERPL CALCULATED.3IONS-SCNC: 8 MMOL/L (ref 3–16)
BASOPHILS ABSOLUTE: 0 K/UL (ref 0–0.2)
BASOPHILS RELATIVE PERCENT: 0.9 %
BUN BLDV-MCNC: 11 MG/DL (ref 7–20)
CALCIUM SERPL-MCNC: 7.9 MG/DL (ref 8.3–10.6)
CHLORIDE BLD-SCNC: 106 MMOL/L (ref 99–110)
CO2: 26 MMOL/L (ref 21–32)
CREAT SERPL-MCNC: 0.6 MG/DL (ref 0.6–1.2)
EOSINOPHILS ABSOLUTE: 0.2 K/UL (ref 0–0.6)
EOSINOPHILS RELATIVE PERCENT: 4.9 %
GFR AFRICAN AMERICAN: >60
GFR NON-AFRICAN AMERICAN: >60
GLUCOSE BLD-MCNC: 95 MG/DL (ref 70–99)
HCT VFR BLD CALC: 31.1 % (ref 36–48)
HEMOGLOBIN: 10.6 G/DL (ref 12–16)
LYMPHOCYTES ABSOLUTE: 0.9 K/UL (ref 1–5.1)
LYMPHOCYTES RELATIVE PERCENT: 22 %
MCH RBC QN AUTO: 34.9 PG (ref 26–34)
MCHC RBC AUTO-ENTMCNC: 34 G/DL (ref 31–36)
MCV RBC AUTO: 102.8 FL (ref 80–100)
MONOCYTES ABSOLUTE: 0.5 K/UL (ref 0–1.3)
MONOCYTES RELATIVE PERCENT: 11.6 %
NEUTROPHILS ABSOLUTE: 2.5 K/UL (ref 1.7–7.7)
NEUTROPHILS RELATIVE PERCENT: 60.6 %
PDW BLD-RTO: 14.9 % (ref 12.4–15.4)
PLATELET # BLD: 131 K/UL (ref 135–450)
PMV BLD AUTO: 9.2 FL (ref 5–10.5)
POTASSIUM SERPL-SCNC: 3.8 MMOL/L (ref 3.5–5.1)
RBC # BLD: 3.03 M/UL (ref 4–5.2)
SODIUM BLD-SCNC: 140 MMOL/L (ref 136–145)
WBC # BLD: 4.2 K/UL (ref 4–11)

## 2018-11-01 PROCEDURE — 22514 PERQ VERTEBRAL AUGMENTATION: CPT

## 2018-11-01 PROCEDURE — 1200000000 HC SEMI PRIVATE

## 2018-11-01 PROCEDURE — 0QS03ZZ REPOSITION LUMBAR VERTEBRA, PERCUTANEOUS APPROACH: ICD-10-PCS | Performed by: RADIOLOGY

## 2018-11-01 PROCEDURE — 6370000000 HC RX 637 (ALT 250 FOR IP): Performed by: PHYSICIAN ASSISTANT

## 2018-11-01 PROCEDURE — 36415 COLL VENOUS BLD VENIPUNCTURE: CPT

## 2018-11-01 PROCEDURE — C1713 ANCHOR/SCREW BN/BN,TIS/BN: HCPCS

## 2018-11-01 PROCEDURE — 6360000002 HC RX W HCPCS: Performed by: NURSE ANESTHETIST, CERTIFIED REGISTERED

## 2018-11-01 PROCEDURE — 2500000003 HC RX 250 WO HCPCS: Performed by: NURSE ANESTHETIST, CERTIFIED REGISTERED

## 2018-11-01 PROCEDURE — 2500000003 HC RX 250 WO HCPCS: Performed by: INTERNAL MEDICINE

## 2018-11-01 PROCEDURE — 6360000004 HC RX CONTRAST MEDICATION: Performed by: INTERNAL MEDICINE

## 2018-11-01 PROCEDURE — 80048 BASIC METABOLIC PNL TOTAL CA: CPT

## 2018-11-01 PROCEDURE — 7100000001 HC PACU RECOVERY - ADDTL 15 MIN

## 2018-11-01 PROCEDURE — 3700000001 HC ADD 15 MINUTES (ANESTHESIA)

## 2018-11-01 PROCEDURE — 2580000003 HC RX 258: Performed by: RADIOLOGY

## 2018-11-01 PROCEDURE — 7100000000 HC PACU RECOVERY - FIRST 15 MIN

## 2018-11-01 PROCEDURE — 6370000000 HC RX 637 (ALT 250 FOR IP): Performed by: RADIOLOGY

## 2018-11-01 PROCEDURE — 6370000000 HC RX 637 (ALT 250 FOR IP): Performed by: INTERNAL MEDICINE

## 2018-11-01 PROCEDURE — 85025 COMPLETE CBC W/AUTO DIFF WBC: CPT

## 2018-11-01 PROCEDURE — 6360000002 HC RX W HCPCS: Performed by: INTERNAL MEDICINE

## 2018-11-01 PROCEDURE — 3700000000 HC ANESTHESIA ATTENDED CARE

## 2018-11-01 PROCEDURE — 2580000003 HC RX 258: Performed by: INTERNAL MEDICINE

## 2018-11-01 PROCEDURE — 0QU03JZ SUPPLEMENT LUMBAR VERTEBRA WITH SYNTHETIC SUBSTITUTE, PERCUTANEOUS APPROACH: ICD-10-PCS | Performed by: RADIOLOGY

## 2018-11-01 RX ORDER — ACETAMINOPHEN 325 MG/1
650 TABLET ORAL EVERY 4 HOURS PRN
Status: DISCONTINUED | OUTPATIENT
Start: 2018-11-01 | End: 2018-11-03 | Stop reason: HOSPADM

## 2018-11-01 RX ORDER — KETAMINE HYDROCHLORIDE 10 MG/ML
INJECTION, SOLUTION INTRAMUSCULAR; INTRAVENOUS PRN
Status: DISCONTINUED | OUTPATIENT
Start: 2018-11-01 | End: 2018-11-01 | Stop reason: SDUPTHER

## 2018-11-01 RX ORDER — OXYCODONE HYDROCHLORIDE AND ACETAMINOPHEN 5; 325 MG/1; MG/1
1 TABLET ORAL EVERY 4 HOURS PRN
Status: DISCONTINUED | OUTPATIENT
Start: 2018-11-01 | End: 2018-11-03 | Stop reason: HOSPADM

## 2018-11-01 RX ORDER — FENTANYL CITRATE 50 UG/ML
INJECTION, SOLUTION INTRAMUSCULAR; INTRAVENOUS PRN
Status: DISCONTINUED | OUTPATIENT
Start: 2018-11-01 | End: 2018-11-01 | Stop reason: SDUPTHER

## 2018-11-01 RX ORDER — LIDOCAINE HYDROCHLORIDE 10 MG/ML
20 INJECTION, SOLUTION EPIDURAL; INFILTRATION; INTRACAUDAL; PERINEURAL ONCE
Status: COMPLETED | OUTPATIENT
Start: 2018-11-01 | End: 2018-11-01

## 2018-11-01 RX ORDER — SODIUM CHLORIDE 9 MG/ML
INJECTION, SOLUTION INTRAVENOUS CONTINUOUS
Status: DISCONTINUED | OUTPATIENT
Start: 2018-11-01 | End: 2018-11-02

## 2018-11-01 RX ORDER — BUPIVACAINE HYDROCHLORIDE 5 MG/ML
30 INJECTION, SOLUTION EPIDURAL; INTRACAUDAL ONCE
Status: COMPLETED | OUTPATIENT
Start: 2018-11-01 | End: 2018-11-01

## 2018-11-01 RX ORDER — OXYCODONE HYDROCHLORIDE AND ACETAMINOPHEN 5; 325 MG/1; MG/1
2 TABLET ORAL EVERY 4 HOURS PRN
Status: DISCONTINUED | OUTPATIENT
Start: 2018-11-01 | End: 2018-11-03 | Stop reason: HOSPADM

## 2018-11-01 RX ADMIN — KETAMINE HYDROCHLORIDE 20 MG: 10 INJECTION, SOLUTION INTRAMUSCULAR; INTRAVENOUS at 10:53

## 2018-11-01 RX ADMIN — BUPIVACAINE HYDROCHLORIDE 50 MG: 5 INJECTION, SOLUTION EPIDURAL; INTRACAUDAL; PERINEURAL at 11:00

## 2018-11-01 RX ADMIN — LIDOCAINE HYDROCHLORIDE 20 ML: 10 INJECTION, SOLUTION EPIDURAL; INFILTRATION; INTRACAUDAL; PERINEURAL at 10:30

## 2018-11-01 RX ADMIN — MORPHINE SULFATE 1 MG: 2 INJECTION, SOLUTION INTRAMUSCULAR; INTRAVENOUS at 08:53

## 2018-11-01 RX ADMIN — DILTIAZEM HYDROCHLORIDE 180 MG: 180 CAPSULE, COATED, EXTENDED RELEASE ORAL at 13:55

## 2018-11-01 RX ADMIN — SODIUM CHLORIDE: 9 INJECTION, SOLUTION INTRAVENOUS at 13:56

## 2018-11-01 RX ADMIN — KETAMINE HYDROCHLORIDE 20 MG: 10 INJECTION, SOLUTION INTRAMUSCULAR; INTRAVENOUS at 10:32

## 2018-11-01 RX ADMIN — Medication 10 ML: at 08:58

## 2018-11-01 RX ADMIN — KETAMINE HYDROCHLORIDE 20 MG: 10 INJECTION, SOLUTION INTRAMUSCULAR; INTRAVENOUS at 10:40

## 2018-11-01 RX ADMIN — FENTANYL CITRATE 5 MCG: 50 INJECTION, SOLUTION INTRAMUSCULAR; INTRAVENOUS at 11:00

## 2018-11-01 RX ADMIN — FENTANYL CITRATE 5 MCG: 50 INJECTION, SOLUTION INTRAMUSCULAR; INTRAVENOUS at 10:32

## 2018-11-01 RX ADMIN — ACETAMINOPHEN 650 MG: 325 TABLET, FILM COATED ORAL at 22:58

## 2018-11-01 RX ADMIN — KETAMINE HYDROCHLORIDE 10 MG: 10 INJECTION, SOLUTION INTRAMUSCULAR; INTRAVENOUS at 11:03

## 2018-11-01 RX ADMIN — ATORVASTATIN CALCIUM 20 MG: 20 TABLET, FILM COATED ORAL at 21:04

## 2018-11-01 RX ADMIN — FENTANYL CITRATE 5 MCG: 50 INJECTION, SOLUTION INTRAMUSCULAR; INTRAVENOUS at 10:40

## 2018-11-01 RX ADMIN — FENTANYL CITRATE 5 MCG: 50 INJECTION, SOLUTION INTRAMUSCULAR; INTRAVENOUS at 10:53

## 2018-11-01 RX ADMIN — TRAZODONE HYDROCHLORIDE 100 MG: 50 TABLET ORAL at 22:58

## 2018-11-01 RX ADMIN — MORPHINE SULFATE 1 MG: 2 INJECTION, SOLUTION INTRAMUSCULAR; INTRAVENOUS at 00:41

## 2018-11-01 RX ADMIN — Medication 10 ML: at 21:06

## 2018-11-01 RX ADMIN — RIVAROXABAN 20 MG: 20 TABLET, FILM COATED ORAL at 18:01

## 2018-11-01 RX ADMIN — LOSARTAN POTASSIUM 50 MG: 25 TABLET ORAL at 13:54

## 2018-11-01 RX ADMIN — IOPAMIDOL 40 ML: 408 INJECTION, SOLUTION INTRAVASCULAR at 11:27

## 2018-11-01 ASSESSMENT — PAIN DESCRIPTION - LOCATION
LOCATION: BACK
LOCATION: BACK

## 2018-11-01 ASSESSMENT — PAIN SCALES - GENERAL
PAINLEVEL_OUTOF10: 0
PAINLEVEL_OUTOF10: 5
PAINLEVEL_OUTOF10: 6
PAINLEVEL_OUTOF10: 8
PAINLEVEL_OUTOF10: 0
PAINLEVEL_OUTOF10: 5

## 2018-11-01 ASSESSMENT — PULMONARY FUNCTION TESTS
PIF_VALUE: 2
PIF_VALUE: 0
PIF_VALUE: 2
PIF_VALUE: 0
PIF_VALUE: 2
PIF_VALUE: 0
PIF_VALUE: 0
PIF_VALUE: 2
PIF_VALUE: 0
PIF_VALUE: 2
PIF_VALUE: 3
PIF_VALUE: 0
PIF_VALUE: 2
PIF_VALUE: 0
PIF_VALUE: 2
PIF_VALUE: 2
PIF_VALUE: 0
PIF_VALUE: 0
PIF_VALUE: 2
PIF_VALUE: 0
PIF_VALUE: 2
PIF_VALUE: 2
PIF_VALUE: 3
PIF_VALUE: 0
PIF_VALUE: 0
PIF_VALUE: 2
PIF_VALUE: 2
PIF_VALUE: 3
PIF_VALUE: 2
PIF_VALUE: 0
PIF_VALUE: 0
PIF_VALUE: 2
PIF_VALUE: 3
PIF_VALUE: 0

## 2018-11-01 ASSESSMENT — ENCOUNTER SYMPTOMS: SHORTNESS OF BREATH: 1

## 2018-11-01 ASSESSMENT — PAIN DESCRIPTION - FREQUENCY: FREQUENCY: CONTINUOUS

## 2018-11-01 ASSESSMENT — PAIN DESCRIPTION - PAIN TYPE: TYPE: ACUTE PAIN

## 2018-11-01 ASSESSMENT — PAIN DESCRIPTION - ORIENTATION
ORIENTATION: LOWER
ORIENTATION: LOWER;MID

## 2018-11-01 NOTE — PROGRESS NOTES
Patient arrived back to unit in stable condition. VSS. Ordering lunch. Daughter at bedside. Bed alarm engaged, call light in reach. Will monitor.

## 2018-11-01 NOTE — PROGRESS NOTES
Assisted patient back to bed. Scheduled xarelto given. Denies additional needs. Bed alarm engaged, call light in reach. Will monitor.

## 2018-11-01 NOTE — PROGRESS NOTES
Patient is alert, denies pain, feels \" a little out of it \" from the anesthesia. She is oriented x 4. Neurovascular checks wnl. Phase 1 criteria met. Waiting on orders from Dr. Gloria Herron before transferring to the floor.

## 2018-11-01 NOTE — ANESTHESIA POSTPROCEDURE EVALUATION
Department of Anesthesiology  Postprocedure Note    Patient: Donell Vargas  MRN: [id number]  YOB: 1935  Date of evaluation: 11/1/2018  Time:  12:14 PM     Procedure Summary     Date:  11/01/18 Room / Location:  [facility] Special Procedures    Anesthesia Start:  1024 Anesthesia Stop:  8705    Procedure:  IR KYPHOPLASTY LUMBAR FIRST LEVEL Diagnosis:  (acute fx seen on ct)    Scheduled Providers: [facility] Radiologist Responsible Provider:  Joey Fraire MD    Anesthesia Type:  MAC, general ASA Status:  4          Anesthesia Type: MAC, general    Chung Phase I: Chung Score: 9    Chung Phase II:      Last vitals: Reviewed and per EMR flowsheets.        Anesthesia Post Evaluation    Patient location during evaluation: PACU  Patient participation: complete - patient participated  Level of consciousness: awake  Airway patency: patent  Nausea & Vomiting: no vomiting and no nausea  Complications: no  Cardiovascular status: hemodynamically stable  Respiratory status: acceptable  Hydration status: stable

## 2018-11-02 ENCOUNTER — APPOINTMENT (OUTPATIENT)
Dept: GENERAL RADIOLOGY | Age: 83
DRG: 517 | End: 2018-11-02
Payer: MEDICARE

## 2018-11-02 LAB
ANION GAP SERPL CALCULATED.3IONS-SCNC: 11 MMOL/L (ref 3–16)
BASOPHILS ABSOLUTE: 0 K/UL (ref 0–0.2)
BASOPHILS RELATIVE PERCENT: 0.9 %
BUN BLDV-MCNC: 10 MG/DL (ref 7–20)
CALCIUM SERPL-MCNC: 8.2 MG/DL (ref 8.3–10.6)
CHLORIDE BLD-SCNC: 104 MMOL/L (ref 99–110)
CO2: 25 MMOL/L (ref 21–32)
CREAT SERPL-MCNC: 0.6 MG/DL (ref 0.6–1.2)
EOSINOPHILS ABSOLUTE: 0.2 K/UL (ref 0–0.6)
EOSINOPHILS RELATIVE PERCENT: 5.3 %
GFR AFRICAN AMERICAN: >60
GFR NON-AFRICAN AMERICAN: >60
GLUCOSE BLD-MCNC: 100 MG/DL (ref 70–99)
HCT VFR BLD CALC: 33.4 % (ref 36–48)
HEMOGLOBIN: 11.3 G/DL (ref 12–16)
LYMPHOCYTES ABSOLUTE: 0.8 K/UL (ref 1–5.1)
LYMPHOCYTES RELATIVE PERCENT: 20.6 %
MCH RBC QN AUTO: 34.5 PG (ref 26–34)
MCHC RBC AUTO-ENTMCNC: 33.7 G/DL (ref 31–36)
MCV RBC AUTO: 102.3 FL (ref 80–100)
MONOCYTES ABSOLUTE: 0.5 K/UL (ref 0–1.3)
MONOCYTES RELATIVE PERCENT: 12.5 %
NEUTROPHILS ABSOLUTE: 2.3 K/UL (ref 1.7–7.7)
NEUTROPHILS RELATIVE PERCENT: 60.7 %
PDW BLD-RTO: 14.9 % (ref 12.4–15.4)
PLATELET # BLD: 147 K/UL (ref 135–450)
PMV BLD AUTO: 8.7 FL (ref 5–10.5)
POTASSIUM SERPL-SCNC: 4 MMOL/L (ref 3.5–5.1)
RBC # BLD: 3.27 M/UL (ref 4–5.2)
SODIUM BLD-SCNC: 140 MMOL/L (ref 136–145)
WBC # BLD: 3.7 K/UL (ref 4–11)

## 2018-11-02 PROCEDURE — 97535 SELF CARE MNGMENT TRAINING: CPT

## 2018-11-02 PROCEDURE — 6360000002 HC RX W HCPCS: Performed by: INTERNAL MEDICINE

## 2018-11-02 PROCEDURE — 90670 PCV13 VACCINE IM: CPT | Performed by: INTERNAL MEDICINE

## 2018-11-02 PROCEDURE — 80048 BASIC METABOLIC PNL TOTAL CA: CPT

## 2018-11-02 PROCEDURE — 6370000000 HC RX 637 (ALT 250 FOR IP): Performed by: PHYSICIAN ASSISTANT

## 2018-11-02 PROCEDURE — 6370000000 HC RX 637 (ALT 250 FOR IP): Performed by: INTERNAL MEDICINE

## 2018-11-02 PROCEDURE — G8979 MOBILITY GOAL STATUS: HCPCS

## 2018-11-02 PROCEDURE — 85025 COMPLETE CBC W/AUTO DIFF WBC: CPT

## 2018-11-02 PROCEDURE — 97530 THERAPEUTIC ACTIVITIES: CPT

## 2018-11-02 PROCEDURE — 36415 COLL VENOUS BLD VENIPUNCTURE: CPT

## 2018-11-02 PROCEDURE — G0009 ADMIN PNEUMOCOCCAL VACCINE: HCPCS | Performed by: INTERNAL MEDICINE

## 2018-11-02 PROCEDURE — 97168 OT RE-EVAL EST PLAN CARE: CPT

## 2018-11-02 PROCEDURE — 97164 PT RE-EVAL EST PLAN CARE: CPT

## 2018-11-02 PROCEDURE — 1200000000 HC SEMI PRIVATE

## 2018-11-02 PROCEDURE — 71046 X-RAY EXAM CHEST 2 VIEWS: CPT

## 2018-11-02 PROCEDURE — 2580000003 HC RX 258: Performed by: INTERNAL MEDICINE

## 2018-11-02 PROCEDURE — 6370000000 HC RX 637 (ALT 250 FOR IP): Performed by: RADIOLOGY

## 2018-11-02 PROCEDURE — G8978 MOBILITY CURRENT STATUS: HCPCS

## 2018-11-02 RX ORDER — FUROSEMIDE 10 MG/ML
INJECTION INTRAMUSCULAR; INTRAVENOUS
Status: DISPENSED
Start: 2018-11-02 | End: 2018-11-03

## 2018-11-02 RX ORDER — FUROSEMIDE 10 MG/ML
20 INJECTION INTRAMUSCULAR; INTRAVENOUS ONCE
Status: COMPLETED | OUTPATIENT
Start: 2018-11-02 | End: 2018-11-02

## 2018-11-02 RX ORDER — OXYCODONE HYDROCHLORIDE AND ACETAMINOPHEN 5; 325 MG/1; MG/1
1 TABLET ORAL EVERY 8 HOURS PRN
Qty: 21 TABLET | Refills: 0 | Status: SHIPPED | OUTPATIENT
Start: 2018-11-02 | End: 2018-11-09

## 2018-11-02 RX ADMIN — ATORVASTATIN CALCIUM 20 MG: 20 TABLET, FILM COATED ORAL at 21:07

## 2018-11-02 RX ADMIN — ACETAMINOPHEN 650 MG: 325 TABLET, FILM COATED ORAL at 23:00

## 2018-11-02 RX ADMIN — Medication 10 ML: at 21:09

## 2018-11-02 RX ADMIN — TRAZODONE HYDROCHLORIDE 100 MG: 50 TABLET ORAL at 23:00

## 2018-11-02 RX ADMIN — ONDANSETRON HYDROCHLORIDE 4 MG: 2 INJECTION, SOLUTION INTRAMUSCULAR; INTRAVENOUS at 23:07

## 2018-11-02 RX ADMIN — FUROSEMIDE 20 MG: 10 INJECTION, SOLUTION INTRAMUSCULAR; INTRAVENOUS at 16:38

## 2018-11-02 RX ADMIN — VITAMIN D, TAB 1000IU (100/BT) 2000 UNITS: 25 TAB at 09:54

## 2018-11-02 RX ADMIN — DILTIAZEM HYDROCHLORIDE 180 MG: 180 CAPSULE, COATED, EXTENDED RELEASE ORAL at 09:54

## 2018-11-02 RX ADMIN — Medication 10 ML: at 09:53

## 2018-11-02 RX ADMIN — ONDANSETRON HYDROCHLORIDE 4 MG: 2 INJECTION, SOLUTION INTRAMUSCULAR; INTRAVENOUS at 13:36

## 2018-11-02 RX ADMIN — PNEUMOCOCCAL 13-VALENT CONJUGATE VACCINE 0.5 ML: 2.2; 2.2; 2.2; 2.2; 2.2; 4.4; 2.2; 2.2; 2.2; 2.2; 2.2; 2.2; 2.2 INJECTION, SUSPENSION INTRAMUSCULAR at 10:04

## 2018-11-02 RX ADMIN — MAGNESIUM HYDROXIDE 30 ML: 400 SUSPENSION ORAL at 11:34

## 2018-11-02 RX ADMIN — LEVOTHYROXINE SODIUM 88 MCG: 88 TABLET ORAL at 07:36

## 2018-11-02 RX ADMIN — RIVAROXABAN 20 MG: 20 TABLET, FILM COATED ORAL at 17:21

## 2018-11-02 RX ADMIN — BISACODYL 10 MG: 5 TABLET, COATED ORAL at 16:48

## 2018-11-02 RX ADMIN — LOSARTAN POTASSIUM 50 MG: 25 TABLET ORAL at 09:54

## 2018-11-02 ASSESSMENT — PAIN DESCRIPTION - PAIN TYPE: TYPE: SURGICAL PAIN

## 2018-11-02 ASSESSMENT — PAIN DESCRIPTION - ORIENTATION: ORIENTATION: LOWER;MID

## 2018-11-02 ASSESSMENT — PAIN DESCRIPTION - LOCATION: LOCATION: BACK

## 2018-11-02 ASSESSMENT — PAIN SCALES - GENERAL
PAINLEVEL_OUTOF10: 0
PAINLEVEL_OUTOF10: 3
PAINLEVEL_OUTOF10: 5
PAINLEVEL_OUTOF10: 0

## 2018-11-02 ASSESSMENT — PAIN DESCRIPTION - FREQUENCY: FREQUENCY: CONTINUOUS

## 2018-11-02 ASSESSMENT — PAIN DESCRIPTION - DIRECTION: RADIATING_TOWARDS: BILATERAL LEGS

## 2018-11-02 NOTE — DISCHARGE SUMMARY
for possible fracture.  Call PCP this morning and was referred to the emergency department for evaluation.  Denies any prior history of spinal surgery.  Pain are related with movement, better at rest.     Pt has intractable pain and was admitted for this  Imaging is reviewed by IR- and it is felt that she is good candidate for kyphoplasty  She was originally scheduled for 10/31 but had hypotension just prior to procecdure, and it had to be rescheduled for 11/1/18    Successful kyphoplasty done 11/1/18 per Dr Romaine He. Pt has immediate pain relief and has not needed pain meds since. PT/OT have eval patient  They feel that she would do better in a supervised environment rather than returning home. Pt is going to be discharged to ARU or SNF  I agree that she is fairly unsafe to be at home - coupled with her poor insight into her own health issues - and would do much better long term in assisted living/snf    Consults made during Hospitalization:  IP CONSULT TO PRIMARY CARE PROVIDER  IP CONSULT TO RADIOLOGY  IP CONSULT TO RADIOLOGY  IP CONSULT TO PHYSICAL MEDICINE REHAB    Treatment team at time of Discharge: Treatment Team: Attending Provider: Nena De La Garza MD; Consulting Physician: Nena De La Garza MD; Consulting Physician: Poli Poon MD; Consulting Physician: Roselia Lopez MD; Consulting Physician: Rolando Ashford MD; Respiratory Therapist (Day): Rehana Bradshaw RCP; Registered Nurse: German Diaz RN; Registered Nurse: Pastor South RN    Imaging Results:  Xr Humerus Left (min 2 Views)    Result Date: 10/31/2018  EXAMINATION: TWO XRAY VIEWS OF THE LEFT HUMERUS; XRAY VIEWS OF THE LEFT ELBOW; AP AND LATERAL XRAY VIEWS OF THE LEFT FOREARM 10/31/2018 11:58 am COMPARISON: Radiographs of the left shoulder 03/29/2009.  HISTORY: ORDERING SYSTEM PROVIDED HISTORY: Left arm pain s/p prone positioning TECHNOLOGIST PROVIDED HISTORY: Reason for exam:->Left arm pain s/p prone positioning FINDINGS: There is no sounds normal; no masses,  no organomegaly  Extremities: extremities normal, atraumatic, no cyanosis or edema  Pulses: 2+ and symmetric    Disposition: SNF    Condition: stable    Discharge Medications:   Douglas Davis   Home Medication Instructions DANA:[id number]    Printed on:11/02/18 0830   Medication Information                      atorvastatin (LIPITOR) 20 MG tablet  Take 1 tablet by mouth daily             Calcium-Phosphorus-Vitamin D (CITRACAL +D3 PO)  Take by mouth             Coenzyme Q10 (CO Q 10 PO)  Take 1 tablet by mouth daily              Compression Stockings MISC  by Does not apply route 20-30 mmHg compression hose bilaterally             diltiazem (CARDIZEM CD) 180 MG extended release capsule  Take 1 capsule by mouth daily             diphenhydrAMINE (BENADRYL) 25 MG tablet  Take 25 mg by mouth nightly as needed for Itching             Ergocalciferol 2000 UNITS TABS  Take 200 mg by mouth daily             levothyroxine (SYNTHROID) 88 MCG tablet  Take 88 mcg by mouth Daily             losartan (COZAAR) 50 MG tablet  Take 50 mg by mouth daily             magnesium gluconate (MAGONATE) 500 MG tablet  Take 500 mg by mouth nightly             oxyCODONE-acetaminophen (PERCOCET) 5-325 MG per tablet  Take 1 tablet by mouth every 8 hours as needed for Pain for up to 7 days. .             rivaroxaban (XARELTO) 20 MG TABS tablet  Take 1 tablet by mouth daily             Turmeric Curcumin 500 MG CAPS  Take 1 tablet by mouth as needed                  Allergies:  No Known Allergies    Follow up Instructions: Follow-up with PCP: Latasha Nguyễn MD in 2-4 wk .       Total time spent on day of discharge including face-to-face visit, examination, documentation, counseling, preparation of discharge plans and followup, and discharge medicine reconciliation and presciptions is 40 minutes    Signed:  Latasha Nguyễn MD  11/2/2018

## 2018-11-02 NOTE — PROGRESS NOTES
Pt. Head to toe ax performed. VSS w/ O2 per NC - see flowsheets. Dr. Rianna Calzada notified for pt O2 sat at 87% on Room air - see flowsheets. Pt. Began therapy with PT after ordered morning medications given - see MAR. Bed alarm on; non-skid socks on pt; brakes on; bed in lowest position. Call light and tray table in reach. Will continue to monitor.

## 2018-11-02 NOTE — PROGRESS NOTES
times  Hearing: Within functional limits     Subjective  General  Chart Reviewed: Yes  Patient assessed for rehabilitation services?: Yes  Family / Caregiver Present: Yes  Diagnosis: fall, compression fracture  Follows Commands: Within Functional Limits  General Comment  Comments: Pt found supine in bed with K pad on low back. Subjective  Subjective: Pt reports no pain in back. Pt agreeable to PT/OT. Pain Screening  Patient Currently in Pain: Denies  Vital Signs  Patient Currently in Pain: Denies  Oxygen Therapy  SpO2: (!) 87 %  O2 Device: None (Room air), RN placed on 2 L/min of O2 and O2 sat increased to 96%. Orientation  Orientation  Overall Orientation Status: Within Functional Limits  Social/Functional History  Social/Functional History  Lives With: Spouse  Type of Home: House  Home Layout: Multi-level  Home Access: Level entry  Bathroom Shower/Tub: Walk-in shower, Shower chair with back  Bathroom Toilet: Handicap height  Bathroom Accessibility: Accessible  Home Equipment: Rolling walker, 4 wheeled walker, [employer]  ADL Assistance: Independent  Homemaking Responsibilities: No  Ambulation Assistance: Independent  Transfer Assistance: Independent  Occupation: Retired  Type of occupation: Retired psychologist.  Leisure & Hobbies: Art (drawing)   Additional Comments: Daughter is present 8-1 and caregivers are there 1-8 and 8-8 (24/7 care). Caregivers assist the pt's . One fall in the last six months. Objective  Observation/Palpation  Posture: Fair  Observation: trunk flexion, protracted scapula.  Cues to stand with extension    AROM RLE (degrees)  RLE AROM: WFL  AROM LLE (degrees)  LLE AROM : WFL  Strength RLE  Strength RLE: WFL  Comment: Not formally tested; tested via functional mobility, grossly >3+/5 hip, knee, ankle   Strength LLE  Strength LLE: WFL  Comment: Not formally tested; tested via functional mobility; grossly >3+/5 hip, knee, ankle   Tone RLE  RLE Tone: Out 1045         Minutes 53           Timed Code Treatment Minutes:  38 Minutes    Total Treatment Minutes:  Dima Morgan, JAIRO Garner, PT   I agree with the above note. PT directly observed the SPT with the patient.   Jerrell Beebe [place] DPT [id number]

## 2018-11-03 ENCOUNTER — APPOINTMENT (OUTPATIENT)
Dept: GENERAL RADIOLOGY | Age: 83
DRG: 517 | End: 2018-11-03
Payer: MEDICARE

## 2018-11-03 VITALS
HEART RATE: 82 BPM | HEIGHT: 69 IN | DIASTOLIC BLOOD PRESSURE: 69 MMHG | WEIGHT: 162 LBS | BODY MASS INDEX: 23.99 KG/M2 | TEMPERATURE: 98 F | OXYGEN SATURATION: 93 % | RESPIRATION RATE: 16 BRPM | SYSTOLIC BLOOD PRESSURE: 109 MMHG

## 2018-11-03 LAB
ANION GAP SERPL CALCULATED.3IONS-SCNC: 7 MMOL/L (ref 3–16)
BASOPHILS ABSOLUTE: 0 K/UL (ref 0–0.2)
BASOPHILS RELATIVE PERCENT: 0.4 %
BUN BLDV-MCNC: 10 MG/DL (ref 7–20)
CALCIUM SERPL-MCNC: 8.3 MG/DL (ref 8.3–10.6)
CHLORIDE BLD-SCNC: 103 MMOL/L (ref 99–110)
CO2: 29 MMOL/L (ref 21–32)
CREAT SERPL-MCNC: 0.6 MG/DL (ref 0.6–1.2)
EOSINOPHILS ABSOLUTE: 0 K/UL (ref 0–0.6)
EOSINOPHILS RELATIVE PERCENT: 0.7 %
GFR AFRICAN AMERICAN: >60
GFR NON-AFRICAN AMERICAN: >60
GLUCOSE BLD-MCNC: 114 MG/DL (ref 70–99)
GLUCOSE BLD-MCNC: 180 MG/DL (ref 70–99)
HCT VFR BLD CALC: 33.5 % (ref 36–48)
HEMOGLOBIN: 11.5 G/DL (ref 12–16)
LYMPHOCYTES ABSOLUTE: 0.6 K/UL (ref 1–5.1)
LYMPHOCYTES RELATIVE PERCENT: 12.3 %
MCH RBC QN AUTO: 34.6 PG (ref 26–34)
MCHC RBC AUTO-ENTMCNC: 34.2 G/DL (ref 31–36)
MCV RBC AUTO: 101.3 FL (ref 80–100)
MONOCYTES ABSOLUTE: 0.6 K/UL (ref 0–1.3)
MONOCYTES RELATIVE PERCENT: 11.8 %
NEUTROPHILS ABSOLUTE: 3.5 K/UL (ref 1.7–7.7)
NEUTROPHILS RELATIVE PERCENT: 74.8 %
PDW BLD-RTO: 14.6 % (ref 12.4–15.4)
PERFORMED ON: ABNORMAL
PLATELET # BLD: 155 K/UL (ref 135–450)
PMV BLD AUTO: 8.6 FL (ref 5–10.5)
POTASSIUM SERPL-SCNC: 3.7 MMOL/L (ref 3.5–5.1)
RBC # BLD: 3.31 M/UL (ref 4–5.2)
SODIUM BLD-SCNC: 139 MMOL/L (ref 136–145)
WBC # BLD: 4.7 K/UL (ref 4–11)

## 2018-11-03 PROCEDURE — 6370000000 HC RX 637 (ALT 250 FOR IP): Performed by: INTERNAL MEDICINE

## 2018-11-03 PROCEDURE — 80048 BASIC METABOLIC PNL TOTAL CA: CPT

## 2018-11-03 PROCEDURE — 71045 X-RAY EXAM CHEST 1 VIEW: CPT

## 2018-11-03 PROCEDURE — 2580000003 HC RX 258: Performed by: INTERNAL MEDICINE

## 2018-11-03 PROCEDURE — 6370000000 HC RX 637 (ALT 250 FOR IP): Performed by: PHYSICIAN ASSISTANT

## 2018-11-03 PROCEDURE — 36415 COLL VENOUS BLD VENIPUNCTURE: CPT

## 2018-11-03 PROCEDURE — 85025 COMPLETE CBC W/AUTO DIFF WBC: CPT

## 2018-11-03 RX ORDER — FUROSEMIDE 20 MG/1
20 TABLET ORAL DAILY
Qty: 60 TABLET | Refills: 3 | Status: SHIPPED | OUTPATIENT
Start: 2018-11-03 | End: 2018-01-01 | Stop reason: SDUPTHER

## 2018-11-03 RX ADMIN — VITAMIN D, TAB 1000IU (100/BT) 2000 UNITS: 25 TAB at 10:02

## 2018-11-03 RX ADMIN — LEVOTHYROXINE SODIUM 88 MCG: 88 TABLET ORAL at 06:45

## 2018-11-03 RX ADMIN — Medication 10 ML: at 10:01

## 2018-11-03 RX ADMIN — DILTIAZEM HYDROCHLORIDE 180 MG: 180 CAPSULE, COATED, EXTENDED RELEASE ORAL at 10:02

## 2018-11-03 RX ADMIN — LOSARTAN POTASSIUM 50 MG: 25 TABLET ORAL at 10:02

## 2018-11-03 NOTE — PROGRESS NOTES
([facility] [I48.91]     Hypothyroid [E03.9] 09/26/2012       Current Facility-Administered Medications: bisacodyl (DULCOLAX) EC tablet 10 mg, 10 mg, Oral, Daily PRN  acetaminophen (TYLENOL) tablet 650 mg, 650 mg, Oral, Q4H PRN  oxyCODONE-acetaminophen (PERCOCET) 5-325 MG per tablet 1 tablet, 1 tablet, Oral, Q4H PRN **OR** oxyCODONE-acetaminophen (PERCOCET) 5-325 MG per tablet 2 tablet, 2 tablet, Oral, Q4H PRN  rivaroxaban (XARELTO) tablet 20 mg, 20 mg, Oral, Daily  losartan (COZAAR) tablet 50 mg, 50 mg, Oral, Daily  lidocaine (LIDODERM) 5 % 1 patch, 1 patch, Transdermal, Daily  levothyroxine (SYNTHROID) tablet 88 mcg, 88 mcg, Oral, Daily  vitamin D (CHOLECALCIFEROL) tablet 2,000 Units, 2,000 Units, Oral, Daily  diltiazem (CARDIZEM CD) extended release capsule 180 mg, 180 mg, Oral, Daily  atorvastatin (LIPITOR) tablet 20 mg, 20 mg, Oral, Nightly  sodium chloride flush 0.9 % injection 10 mL, 10 mL, Intravenous, 2 times per day  sodium chloride flush 0.9 % injection 10 mL, 10 mL, Intravenous, PRN  magnesium hydroxide (MILK OF MAGNESIA) 400 MG/5ML suspension 30 mL, 30 mL, Oral, Daily PRN  ondansetron (ZOFRAN) injection 4 mg, 4 mg, Intravenous, Q6H PRN  famotidine (PEPCID) tablet 20 mg, 20 mg, Oral, BID PRN  potassium chloride (KLOR-CON M) extended release tablet 40 mEq, 40 mEq, Oral, PRN **OR** potassium bicarb-citric acid (EFFER-K) effervescent tablet 40 mEq, 40 mEq, Oral, PRN **OR** potassium chloride 10 mEq/100 mL IVPB (Peripheral Line), 10 mEq, Intravenous, PRN  morphine injection 1 mg, 1 mg, Intravenous, Q4H PRN  traZODone (DESYREL) tablet 100 mg, 100 mg, Oral, Nightly PRN         Objective:  /63   Pulse 93   Temp 98.1 °F (36.7 °C) (Oral)   Resp 16   Ht 5' 9\" (1.753 m)   Wt 162 lb (73.5 kg)   SpO2 94%   BMI 23.92 kg/m²      Patient Vitals for the past 24 hrs:   BP Temp Temp src Pulse Resp SpO2   11/03/18 1021 - - - - - 94 %   11/03/18 0959 107/63 98.1 °F (36.7 °C) Oral 93 16 93 %   11/03/18 0730 - - - - - mA/kV was utilized to reduce the radiation dose to as low as reasonably achievable.; CT of the lumbar spine was performed without the administration of intravenous contrast. Multiplanar reformatted images are provided for review. Dose modulation, iterative reconstruction, and/or weight based adjustment of the mA/kV was utilized to reduce the radiation dose to as low as reasonably achievable. COMPARISON: None. HISTORY: ORDERING SYSTEM PROVIDED HISTORY: fall midline pain TECHNOLOGIST PROVIDED HISTORY: Ordering Physician Provided Reason for Exam: fall; fall on concrete midline pain Acuity: Acute Type of Exam: Initial FINDINGS: BONES/ALIGNMENT: Bone mineralization appears mildly abnormally low. There is normal alignment of the thoracic and lumbar spine. Mild acute or subacute compression superior endplate L1 without retropulsion, fracture line on the right extending to the anterior margin of the middle column. The thoracic vertebral body heights are maintained. No osseous destructive lesion is seen. DEGENERATIVE CHANGES: No spinal canal stenosis or bony neural foraminal narrowing of the thoracic or lumbar spine. Mild degenerative disc disease predominates midthoracic spine. Prominent Schmorl's node superior endplate L2. Mild, bilaterally symmetrical SI joint osteoarthritis. SOFT TISSUES: No paraspinal mass is seen. Small volume bilateral pleural effusion with bibasilar relaxation atelectasis, right greater than left. Small hiatal hernia. Calcific atherosclerotic disease aorta. 1. Mild acute or subacute compression superior endplate L1 without retropulsion, fracture line on the right extending to the anterior margin of the middle column. 2. No acute osseous abnormality evident thoracic spine. 3. Bones appear osteoporotic. 4. Small volume bilateral pleural effusion, right greater than left, with associated bibasilar relaxation atelectasis. Pneumonitis is a consideration.  5. Mild degenerative changes thoracic

## 2018-11-05 LAB
EKG ATRIAL RATE: 394 BPM
EKG DIAGNOSIS: NORMAL
EKG Q-T INTERVAL: 400 MS
EKG QRS DURATION: 86 MS
EKG QTC CALCULATION (BAZETT): 489 MS
EKG R AXIS: 2 DEGREES
EKG T AXIS: 7 DEGREES
EKG VENTRICULAR RATE: 90 BPM

## 2018-11-09 ENCOUNTER — TELEPHONE (OUTPATIENT)
Dept: GENERAL RADIOLOGY | Age: 83
End: 2018-11-09

## 2018-11-12 ENCOUNTER — TELEPHONE (OUTPATIENT)
Dept: CARDIOLOGY CLINIC | Age: 83
End: 2018-11-12

## 2018-11-12 NOTE — TELEPHONE ENCOUNTER
Dimas Jones from [address] [facility] states this patient came to the NH with these inflatable leg compression devices after being discharged from [facility]. Who should she ask about them if not this office ? They think because her diagnosis is afib and PE that she needs to be seen sooner than 12/4/18.

## 2018-11-13 ENCOUNTER — TELEPHONE (OUTPATIENT)
Dept: CARDIOLOGY CLINIC | Age: 83
End: 2018-11-13

## 2018-11-13 DIAGNOSIS — R55 SYNCOPE AND COLLAPSE: Primary | ICD-10-CM

## 2018-11-13 NOTE — TELEPHONE ENCOUNTER
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 Yanci Hollis:      1. Hyperlipidemia, unspecified hyperlipidemia type    2. Essential hypertension    3. Palpitations    4. Paroxysmal atrial fibrillation (HCC)    5. Edema, unspecified type    6. Occlusion and stenosis of bilateral carotid arteries          Paroxysmal Atrial fibrillation, Asymptomatic, Rate controlled on CCB,[id number]= 4 on NOAC  Recovered LV function with mild multivalvular heart disease by March 2016   Hypertension controlled on ARB/CCB  Hyperlipidemia, without recent bloodwork  Hypothyroidism  Hx. Breast Cancer      Recs/Surveillance testing as below.            Orders Placed This Encounter   Procedures    VL Carotid Bilateral       Standing Status:   Future       Standing Expiration Date:   6/5/2019    Lipid Panel       Order Specific Question:   Is Patient Fasting?/# of Hours       Answer:   8    Comprehensive Metabolic Panel      Return in about 6 months (around 12/5/2018).   Patient Instructions   Recommend echo in March 2019     Recommend support hose daily, elevation of legs, decrease salt intake  20-30 mmHg pressure support hose bilateral--[place]     Lasix daily in the morning for one week, then as needed for swelling     Renal panel and fasting lipid in one week     Carotid dopplers     Follow up in 6 months              Please advise, Thank you

## 2018-11-15 ENCOUNTER — HOSPITAL ENCOUNTER (OUTPATIENT)
Dept: NON INVASIVE DIAGNOSTICS | Age: 83
Discharge: HOME | End: 2018-11-15
Payer: COMMERCIAL

## 2018-11-15 DIAGNOSIS — R55 SYNCOPE AND COLLAPSE: ICD-10-CM

## 2018-11-15 LAB
LV EF: 53 %
LVEF MODALITY: NORMAL

## 2018-11-15 PROCEDURE — C8929 TTE W OR WO FOL WCON,DOPPLER: HCPCS

## 2018-11-15 PROCEDURE — 6360000004 HC RX CONTRAST MEDICATION: Performed by: INTERNAL MEDICINE

## 2018-11-15 RX ADMIN — PERFLUTREN 1.21 MG: 6.52 INJECTION, SUSPENSION INTRAVENOUS at 16:26

## 2018-11-16 NOTE — PROGRESS NOTES
Spoke with patient and her daughter Stephany Barker, per [facility] echo stable, and if pt feels ok, we will keep her OV scheduled for 12/4. Pt denies SOB and chest pain and is agreeable to appointment. Instructed pt to call us if she experiences symptoms. She verbalized understanding.

## 2018-11-20 ENCOUNTER — TELEPHONE (OUTPATIENT)
Dept: CARDIOLOGY CLINIC | Age: 83
End: 2018-11-20

## 2018-11-20 NOTE — TELEPHONE ENCOUNTER
Daughter has more questions about the echo that this patient had last week . The NH is thinking about sending her home but daughter doesn't know if she should go home.

## 2018-11-30 ENCOUNTER — OFFICE VISIT (OUTPATIENT)
Dept: INTERNAL MEDICINE CLINIC | Age: 83
End: 2018-11-30
Payer: MEDICARE

## 2018-11-30 VITALS
BODY MASS INDEX: 23.28 KG/M2 | SYSTOLIC BLOOD PRESSURE: 110 MMHG | WEIGHT: 157.2 LBS | HEART RATE: 84 BPM | HEIGHT: 69 IN | DIASTOLIC BLOOD PRESSURE: 68 MMHG

## 2018-11-30 DIAGNOSIS — D64.89 ANEMIA DUE TO OTHER CAUSE, NOT CLASSIFIED: ICD-10-CM
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
## 2018-11-30 DIAGNOSIS — F51.01 PRIMARY INSOMNIA: ICD-10-CM

## 2018-11-30 DIAGNOSIS — I10 ESSENTIAL HYPERTENSION: Chronic | ICD-10-CM

## 2018-11-30 DIAGNOSIS — E03.8 OTHER SPECIFIED HYPOTHYROIDISM: ICD-10-CM

## 2018-11-30 DIAGNOSIS — Z91.81 AT HIGH RISK FOR FALLS: ICD-10-CM

## 2018-11-30 DIAGNOSIS — I48.0 PAROXYSMAL ATRIAL FIBRILLATION (HCC): Primary | ICD-10-CM

## 2018-11-30 DIAGNOSIS — Z85.3 HISTORY OF BREAST CANCER: ICD-10-CM

## 2018-11-30 DIAGNOSIS — R06.2 WHEEZING: ICD-10-CM

## 2018-11-30 DIAGNOSIS — E78.2 MIXED HYPERLIPIDEMIA: Chronic | ICD-10-CM

## 2018-11-30 DIAGNOSIS — M85.89 OSTEOPENIA OF MULTIPLE SITES: ICD-10-CM

## 2018-11-30 DIAGNOSIS — R41.3 MEMORY CHANGES: ICD-10-CM
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
## 2018-11-30 PROCEDURE — G8484 FLU IMMUNIZE NO ADMIN: HCPCS | Performed by: NURSE PRACTITIONER

## 2018-11-30 PROCEDURE — 1101F PT FALLS ASSESS-DOCD LE1/YR: CPT | Performed by: NURSE PRACTITIONER

## 2018-11-30 PROCEDURE — 99204 OFFICE O/P NEW MOD 45 MIN: CPT | Performed by: NURSE PRACTITIONER

## 2018-11-30 PROCEDURE — 1090F PRES/ABSN URINE INCON ASSESS: CPT | Performed by: NURSE PRACTITIONER

## 2018-11-30 PROCEDURE — G8427 DOCREV CUR MEDS BY ELIG CLIN: HCPCS | Performed by: NURSE PRACTITIONER

## 2018-11-30 PROCEDURE — G8420 CALC BMI NORM PARAMETERS: HCPCS | Performed by: NURSE PRACTITIONER

## 2018-11-30 PROCEDURE — 1111F DSCHRG MED/CURRENT MED MERGE: CPT | Performed by: NURSE PRACTITIONER

## 2018-11-30 PROCEDURE — G8399 PT W/DXA RESULTS DOCUMENT: HCPCS | Performed by: NURSE PRACTITIONER

## 2018-11-30 PROCEDURE — 1036F TOBACCO NON-USER: CPT | Performed by: NURSE PRACTITIONER

## 2018-11-30 PROCEDURE — G8598 ASA/ANTIPLAT THER USED: HCPCS | Performed by: NURSE PRACTITIONER

## 2018-11-30 PROCEDURE — 4040F PNEUMOC VAC/ADMIN/RCVD: CPT | Performed by: NURSE PRACTITIONER

## 2018-11-30 PROCEDURE — 1123F ACP DISCUSS/DSCN MKR DOCD: CPT | Performed by: NURSE PRACTITIONER

## 2018-11-30 RX ORDER — SENNA PLUS 8.6 MG/1
2 TABLET ORAL 2 TIMES DAILY
COMMUNITY
End: 2018-11-30

## 2018-11-30 RX ORDER — ACETAMINOPHEN 325 MG/1
650 TABLET ORAL EVERY 6 HOURS PRN
COMMUNITY

## 2018-11-30 RX ORDER — TRAZODONE HYDROCHLORIDE 50 MG/1
50 TABLET ORAL NIGHTLY
COMMUNITY
End: 2018-01-01 | Stop reason: SDUPTHER

## 2018-11-30 RX ORDER — LEVOTHYROXINE SODIUM 0.1 MG/1
100 TABLET ORAL DAILY
COMMUNITY
End: 2018-01-01 | Stop reason: SDUPTHER

## 2018-11-30 RX ORDER — POTASSIUM CHLORIDE 20 MEQ/1
20 TABLET, EXTENDED RELEASE ORAL DAILY
COMMUNITY
End: 2018-01-01 | Stop reason: SDUPTHER

## 2018-11-30 RX ORDER — ALBUTEROL SULFATE 90 UG/1
1 AEROSOL, METERED RESPIRATORY (INHALATION) EVERY 6 HOURS PRN
Qty: 1 INHALER | Refills: 1 | Status: SHIPPED | OUTPATIENT
Start: 2018-11-30 | End: 2019-01-01 | Stop reason: SDUPTHER

## 2018-11-30 ASSESSMENT — ENCOUNTER SYMPTOMS
COUGH: 0
NAUSEA: 0
CONSTIPATION: 0
ABDOMINAL PAIN: 0
SHORTNESS OF BREATH: 0
DIARRHEA: 0
SINUS PAIN: 0
BLOOD IN STOOL: 0
WHEEZING: 0
VOMITING: 0
SORE THROAT: 0
BACK PAIN: 0

## 2018-11-30 ASSESSMENT — PATIENT HEALTH QUESTIONNAIRE - PHQ9
SUM OF ALL RESPONSES TO PHQ QUESTIONS 1-9: 0
SUM OF ALL RESPONSES TO PHQ9 QUESTIONS 1 & 2: 0
SUM OF ALL RESPONSES TO PHQ QUESTIONS 1-9: 0
1. LITTLE INTEREST OR PLEASURE IN DOING THINGS: 0
2. FEELING DOWN, DEPRESSED OR HOPELESS: 0

## 2018-11-30 NOTE — PROGRESS NOTES
New Patient Office Visit   11/30/18    Subjective:  Chief Complaint   Patient presents with    Established New Doctor     currently at [facility] for rehab- ready for a new PCP      HPI:   Ben Ragsdale is a 81 y.o. female who presents to the clinic today to establish care. She was seeing another PCP who was a family friend. She states that she would like to choose a new PCP at this time. Fall-  Currently in rehab for a fall 5 weeks ago- fracture at L1. She had a kyphoplasty with improvement. Cardiomegaly and bilateral pleural effusions were found. She was placed on lasix 40 mg PO and K in the last 2 weeks. The MD in the rehab center is following her labs/scans and increased her lasix from 20 to 40 mg this week. He is following her lab work. They do not have these labs today but will bring them to the next appt. She has not fallen since entering rehab. Pt's daughter reports there are some short term memory deficits that have been occurring in the pt. The rehab center is following this. She is not driving at this time. She may be discharged Monday. HTN- She sees Dr. Gonzalo Lockett, cardiology for afib, HLD, and HTN. Her cardiologist is prescribing Cardizem 180 mg PO daily, cozaar 50 mg PO daily and Xarelto 20 mg PO daily. HLD- She takes Lipitor 20 mg PO daily without side effects. Insomnia- She reports that she takes magnesium for sleep and d/t previous deficiency. She is taking Trazodone for insomnia with relief as well. Hypothyroidism- Pt has a history of hypothyroidism. She was diagnosed in her 35s. She has been on synthroid with no side effects. She is now taking synthroid 100 mg PO daily- this change occurred 2-3 weeks ago. She reports that she uses Synthroid BERKLEY, not generic. Osteopenia- She is taking calcium and vit D. She states that she will not take Fosamax. Anemia- the patient reports that she has never been diagnosed with anemia.   Her last CBC was low in the hospital.  She reports

## 2018-12-04 ENCOUNTER — OFFICE VISIT (OUTPATIENT)
Dept: CARDIOLOGY CLINIC | Age: 83
End: 2018-12-04
Payer: MEDICARE

## 2018-12-04 ENCOUNTER — HOSPITAL ENCOUNTER (OUTPATIENT)
Age: 83
Discharge: HOME | End: 2018-12-04
Payer: MEDICARE

## 2018-12-04 VITALS
HEIGHT: 69 IN | DIASTOLIC BLOOD PRESSURE: 58 MMHG | HEART RATE: 88 BPM | BODY MASS INDEX: 22.22 KG/M2 | SYSTOLIC BLOOD PRESSURE: 94 MMHG | WEIGHT: 150 LBS | OXYGEN SATURATION: 90 %

## 2018-12-04 DIAGNOSIS — E78.2 MIXED HYPERLIPIDEMIA: Primary | Chronic | ICD-10-CM

## 2018-12-04 DIAGNOSIS — I10 ESSENTIAL HYPERTENSION: Chronic | ICD-10-CM

## 2018-12-04 DIAGNOSIS — R06.02 SOB (SHORTNESS OF BREATH): ICD-10-CM

## 2018-12-04 DIAGNOSIS — I48.0 PAROXYSMAL ATRIAL FIBRILLATION (HCC): ICD-10-CM

## 2018-12-04 LAB
ANION GAP SERPL CALCULATED.3IONS-SCNC: 11 MMOL/L (ref 3–16)
BUN BLDV-MCNC: 16 MG/DL (ref 7–20)
CALCIUM SERPL-MCNC: 9.4 MG/DL (ref 8.3–10.6)
CHLORIDE BLD-SCNC: 101 MMOL/L (ref 99–110)
CO2: 26 MMOL/L (ref 21–32)
CREAT SERPL-MCNC: 0.7 MG/DL (ref 0.6–1.2)
GFR AFRICAN AMERICAN: >60
GFR NON-AFRICAN AMERICAN: >60
GLUCOSE BLD-MCNC: 113 MG/DL (ref 70–99)
POTASSIUM SERPL-SCNC: 4.3 MMOL/L (ref 3.5–5.1)
PRO-BNP: 563 PG/ML (ref 0–449)
SODIUM BLD-SCNC: 138 MMOL/L (ref 136–145)

## 2018-12-04 PROCEDURE — 1101F PT FALLS ASSESS-DOCD LE1/YR: CPT | Performed by: INTERNAL MEDICINE

## 2018-12-04 PROCEDURE — G8399 PT W/DXA RESULTS DOCUMENT: HCPCS | Performed by: INTERNAL MEDICINE

## 2018-12-04 PROCEDURE — 36415 COLL VENOUS BLD VENIPUNCTURE: CPT

## 2018-12-04 PROCEDURE — 1036F TOBACCO NON-USER: CPT | Performed by: INTERNAL MEDICINE

## 2018-12-04 PROCEDURE — G8598 ASA/ANTIPLAT THER USED: HCPCS | Performed by: INTERNAL MEDICINE

## 2018-12-04 PROCEDURE — 99214 OFFICE O/P EST MOD 30 MIN: CPT | Performed by: INTERNAL MEDICINE

## 2018-12-04 PROCEDURE — G8484 FLU IMMUNIZE NO ADMIN: HCPCS | Performed by: INTERNAL MEDICINE

## 2018-12-04 PROCEDURE — 80048 BASIC METABOLIC PNL TOTAL CA: CPT

## 2018-12-04 PROCEDURE — 83880 ASSAY OF NATRIURETIC PEPTIDE: CPT

## 2018-12-04 PROCEDURE — G8427 DOCREV CUR MEDS BY ELIG CLIN: HCPCS | Performed by: INTERNAL MEDICINE

## 2018-12-04 PROCEDURE — G8420 CALC BMI NORM PARAMETERS: HCPCS | Performed by: INTERNAL MEDICINE

## 2018-12-04 PROCEDURE — 1090F PRES/ABSN URINE INCON ASSESS: CPT | Performed by: INTERNAL MEDICINE

## 2018-12-04 PROCEDURE — 1123F ACP DISCUSS/DSCN MKR DOCD: CPT | Performed by: INTERNAL MEDICINE

## 2018-12-04 PROCEDURE — 4040F PNEUMOC VAC/ADMIN/RCVD: CPT | Performed by: INTERNAL MEDICINE

## 2018-12-04 RX ORDER — HYDROCODONE BITARTRATE AND ACETAMINOPHEN 5; 325 MG/1; MG/1
1 TABLET ORAL EVERY 6 HOURS PRN
COMMUNITY
End: 2018-01-01 | Stop reason: ALTCHOICE

## 2018-12-04 RX ORDER — SENNA PLUS 8.6 MG/1
1 TABLET ORAL 2 TIMES DAILY
COMMUNITY
End: 2018-01-01

## 2018-12-04 NOTE — COMMUNICATION BODY
2018    PATIENT: Dante Zavala  : 1935    Primary Care Provider:   ANABELL Morillo CNP  Y:[phone]  W:[phone]      Reason for evaluation:   Chief Complaint   Patient presents with    Atrial Fibrillation     follow up - SOB - had an ECHO at [facility]- 5 weeks ago fell and broke her lumbar and for lung condtions     Hypertension    Hyperlipidemia       History of present illness:   Ms. Dante Zavala is a 81 y.o. female patient here today for routine six-month follow-up. 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 She denies preceding chest pain, palpitations, lightheadedness, syncope. She has 2 daughters who are both registered nurses and one of them has taken over managing Nelly's medications [facility] Keke Hanley is very satisfied with her rehabilitation at the facility but says that rarely she has still required oxygen after exercises. A repeat x-ray was done there and described to still have mild congestion. I do not have access to this report or image. Subsequently her Lasix was increased to 40 mg daily with supplemental potassium at 20 mEq daily. She agrees that she is without lower extremity edema abdominal distention PND or orthopnea. They both say that they feel like she is still recovering. Her  is at home with 24-hour care and she is looking to return home in the next several weeks.      Medical History:      Diagnosis Date    Arthritis     fingers; osteoarthritis    Atrial fibrillation (HCC)     Cancer ([facility] [address]     breast cancer    Carotid artery stenosis     Chronic kidney disease     active UTI    Depression     Fx ankle 2/4/15    LT    History of blood transfusion     after chemo     Hyperlipidemia     Hypertension     Hypothyroidism     Movement disorder     Palpitation     Psychiatric problem     post of confusion    Shingles     Syncope and collapse     Thyroid disease     thyroid removed 1970 radiation prior       Surgical History:      Procedure Laterality Date    ANKLE SURGERY  2/4/15    ORIF LEFT ANKLE    BREAST SURGERY      lumpectomy & quadrectomy; w/lymphnode removal. Transplanted own back muscle to chest    COLONOSCOPY      ENDOSCOPY, COLON, DIAGNOSTIC      EYE SURGERY      bilat cataract removal    FRACTURE SURGERY  2010, 2015     left shoulder humerus fx (titanium) , ORIF left ankle    HYSTERECTOMY      OTHER SURGICAL HISTORY      radiation of the thyroid    OTHER SURGICAL HISTORY  3/22/16     Loop recorder implanted    SHOULDER SURGERY         Social History:  Social History     Social History    Marital status:      Spouse name: N/A    Number of children: N/A    Years of education: N/A     Occupational History    Not on file. Social History Main Topics    Smoking status: Never Smoker    Smokeless tobacco: Never Used    Alcohol use Yes      Comment: no desire since last fall; usually 1 glass wine daily    Drug use: No    Sexual activity: Not on file     Other Topics Concern    Not on file     Social History Narrative    For fun, she enjoys drawing, art, cat and gardening. She enjoys spending time with her family as well as reading. Family in town. 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 4 children. 6 grandchildren. Family History:  No evidence for sudden cardiac death or premature CAD. Family History   Problem Relation Age of Onset    Heart Disease Mother     Cancer Mother     Heart Disease Father     High Blood Pressure Neg Hx     High Cholesterol Neg Hx        Medications:  [x] Medications and dosages reviewed. Prior to Admission medications    Medication Sig Start Date End Date Taking?  Authorizing Provider   magnesium hydroxide (MILK OF MAGNESIA) 400 MG/5ML suspension Take by mouth daily as needed for Constipation   Yes Historical Provider, MD   HYDROcodone-acetaminophen (NORCO) 5-325 MG per tablet Take 1 tablet by mouth every 6 hours as needed for Pain. .   Yes Historical Provider, MD   senna (SENOKOT) 8.6 MG tablet Take 1 tablet by mouth 2 times daily   Yes Historical Provider, MD   acetaminophen (TYLENOL) 325 MG tablet Take 650 mg by mouth every 6 hours as needed for Pain   Yes Historical Provider, MD   potassium chloride (KLOR-CON M) 20 MEQ extended release tablet Take 20 mEq by mouth daily   Yes Historical Provider, MD   levothyroxine (SYNTHROID) 100 MCG tablet Take 100 mcg by mouth Daily MUST BE BRAND NAME   Yes Historical Provider, MD   traZODone (DESYREL) 50 MG tablet Take 50 mg by mouth nightly   Yes Historical Provider, MD   albuterol sulfate  (90 Base) MCG/ACT inhaler Inhale 1 puff into the lungs every 6 hours as needed for Wheezing or Shortness of Breath 11/30/18  Yes Bean Hartman, APRN - CNP   furosemide (LASIX) 20 MG tablet Take 1 tablet by mouth daily  Patient taking differently: Take 40 mg by mouth daily  11/3/18  Yes Apoorva Malik MD   losartan (COZAAR) 50 MG tablet Take 50 mg by mouth daily   Yes Historical Provider, MD   magnesium gluconate (MAGONATE) 500 MG tablet Take 500 mg by mouth nightly   Yes Historical Provider, MD   atorvastatin (LIPITOR) 20 MG tablet Take 1 tablet by mouth daily 7/19/18  Yes Amanda Moser DO   diltiazem (CARDIZEM CD) 180 MG extended release capsule Take 1 capsule by mouth daily 6/5/18  Yes Amanda Moser DO   rivaroxaban (XARELTO) 20 MG TABS tablet Take 1 tablet by mouth daily 6/5/18  Yes Amanda Moser DO   Ergocalciferol 2000 UNITS TABS Take 200 mg by mouth daily 4/11/17  Yes Kristine Boothe MD   Coenzyme Q10 (CO Q 10 PO) Take 1 tablet by mouth daily    Yes Historical Provider, MD   Calcium-Phosphorus-Vitamin D (CITRACAL +D3 PO) Take by mouth   Yes Historical Provider, MD   Turmeric Curcumin 500 MG left ventricle size and wall thickness. Probably mildly abnormal   systolic function with an estimated ejection fraction of 50-55%. No obvious   regional wall motion abnormalities are seen. Diastolic filling parameters   suggests normal diastolic function.   Moderate mitral regurgitation is present.   Mild tricuspid regurgitation.   Estimated pulmonary artery systolic pressure is at 30 mmHg assuming a right   atrial pressure of 3 mmHg. STRESS TEST:   1/2013  Conclusions        Summary    Normal LV size and systolic function    Left ventricular ejection fraction of 74 %.    There is normal isotope uptake at stress and rest. There is no evidence of    myocardial ischemia or scar.    Normal Lexiscan stress test.       3/2016  Impressions   Right Impression   The right internal carotid artery appears to have a 16-49% diameter reducing   stenosis based on velocity criteria. The right vertebral artery demonstrates normal antegrade flow. The right subclavian artery is visualized and demonstrates turbulent flow. The right brachial pressure was not obtained due to IV placement . Left Impression   The left internal carotid artery appears to have a 1-15% diameter reducing   stenosis based on velocity criteria. The left vertebral artery demonstrates normal antegrade flow. The left subclavian artery is visualized and demonstrates multiphasic flow. The device is Reveal LINQ  CubeTree Implant date: 3/22/2016  The device was programmed to detect:   VT: 380 ms 16 beats   Bradycardia: 40 bpm.     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 Dipesh Green:     HFpEF (LVEF 50-55%), now compensated, NYHA Class II  Paroxysmal Atrial fibrillation, Asymptomatic, Rate controlled on CCB,[id number]= 4 on NOAC  Mild/moderate multivalvular heart disease by November 2018  Hypertension controlled on ARB/CCB  Hx. Hyperlipidemia   Hypothyroidism  Hx.  Breast Cancer

## 2018-12-04 NOTE — LETTER
[address] [address] [address] [address]  Phone: [phone]  Fax: [address], DO        2018     ANABELL Romero CNP  [address] [facility]  [address]  [address]    Patient: Harjinder Benton  MR Number: [id number]  YOB: 1935  Date of Visit: 2018    Dear Dr. Flori Dickson:                                         2018    PATIENT: Harjinder Benton  : 1935    Primary Care Provider:   ANABELL Romero CNP  D:[phone]  U:[phone]      Reason for evaluation:   Chief Complaint   Patient presents with    Atrial Fibrillation     follow up - SOB - had an ECHO at [facility]- 5 weeks ago fell and broke her lumbar and for lung condtions     Hypertension    Hyperlipidemia       History of present illness:   Ms. Harjinder Benton is a 81 y.o. female patient here today for routine six-month follow-up. 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 She denies preceding chest pain, palpitations, lightheadedness, syncope. She has 2 daughters who are both registered nurses and one of them has taken over managing Nelly's medications since being discharged to [facility]. Gladis Vargas is very satisfied with her rehabilitation at the facility but says that rarely she has still required oxygen after exercises. A repeat x-ray was done there and described to still have mild congestion. I do not have access to this report or image. Subsequently her Lasix was increased to 40 mg daily with supplemental potassium at 20 mEq daily. She agrees that she is without lower extremity edema abdominal distention PND or orthopnea. They both say that they feel like she is still recovering.   Her  is at home with 24-hour care and she is looking to return home in the next several weeks. Medical History:      Diagnosis Date    Arthritis     fingers; osteoarthritis    Atrial fibrillation (HCC)     Cancer ([facility] [address] 2000    breast cancer    Carotid artery stenosis     Chronic kidney disease     active UTI    Depression     Fx ankle 2/4/15    LT    History of blood transfusion     after chemo 2002    Hyperlipidemia     Hypertension     Hypothyroidism     Movement disorder     Palpitation     Psychiatric problem     post of confusion    Shingles     Syncope and collapse     Thyroid disease     thyroid removed 1970 radiation prior       Surgical History:      Procedure Laterality Date    ANKLE SURGERY  2/4/15    ORIF LEFT ANKLE    BREAST SURGERY      lumpectomy & quadrectomy; w/lymphnode removal. Transplanted own back muscle to chest    COLONOSCOPY      ENDOSCOPY, COLON, DIAGNOSTIC      EYE SURGERY      bilat cataract removal    FRACTURE SURGERY  2010, 2015     left shoulder humerus fx (titanium) , ORIF left ankle    HYSTERECTOMY      OTHER SURGICAL HISTORY      radiation of the thyroid    OTHER SURGICAL HISTORY  3/22/16     Loop recorder implanted    SHOULDER SURGERY         Social History:  Social History     Social History    Marital status:      Spouse name: N/A    Number of children: N/A    Years of education: N/A     Occupational History    Not on file. Social History Main Topics    Smoking status: Never Smoker    Smokeless tobacco: Never Used    Alcohol use Yes      Comment: no desire since last fall; usually 1 glass wine daily    Drug use: No    Sexual activity: Not on file     Other Topics Concern    Not on file     Social History Narrative    For fun, she enjoys drawing, art, cat and gardening. She enjoys spending time with her family as well as reading. Family in town. 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 4 children. 6 grandchildren. Family History:  No evidence for sudden cardiac death or premature CAD.

## 2018-12-04 NOTE — PATIENT INSTRUCTIONS
Get labs drawn  Call if you have questions or concerns  Follow up in 6 months  Follow up with CHF in 1 month

## 2018-12-04 NOTE — PROGRESS NOTES
2018    PATIENT: Jess Hernandez  : 1935    Primary Care Provider:   ANABELL Hubbard CNP  H:[phone]  B:[phone]      Reason for evaluation:   Chief Complaint   Patient presents with    Atrial Fibrillation     follow up - SOB - had an ECHO at [address] [facility]- 5 weeks ago fell and broke her lumbar and for lung condtions     Hypertension    Hyperlipidemia       History of present illness:   Ms. Jess Hernandez is a 81 y.o. female patient here today for routine six-month follow-up. 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 She denies preceding chest pain, palpitations, lightheadedness, syncope. She has 2 daughters who are both registered nurses and one of them has taken over managing Nelly's medications since being discharged to [facility]. Kylah Harrington is very satisfied with her rehabilitation at the facility but says that rarely she has still required oxygen after exercises. A repeat x-ray was done there and described to still have mild congestion. I do not have access to this report or image. Subsequently her Lasix was increased to 40 mg daily with supplemental potassium at 20 mEq daily. She agrees that she is without lower extremity edema abdominal distention PND or orthopnea. They both say that they feel like she is still recovering. Her  is at home with 24-hour care and she is looking to return home in the next several weeks.      Medical History:      Diagnosis Date    Arthritis     fingers; osteoarthritis    Atrial fibrillation (HCC)     Cancer ([address]     breast cancer    Carotid artery stenosis     Chronic kidney disease     active UTI    Depression     Fx ankle 2/4/15    LT    History of blood transfusion     after chemo     Hyperlipidemia     Hypertension    

## 2018-12-14 ENCOUNTER — HOSPITAL ENCOUNTER (OUTPATIENT)
Dept: CT IMAGING | Age: 83
Discharge: HOME | End: 2018-12-14
Payer: MEDICARE

## 2018-12-14 DIAGNOSIS — R06.00 DYSPNEA, UNSPECIFIED TYPE: ICD-10-CM

## 2018-12-14 DIAGNOSIS — R79.1 ABNORMAL BLEEDING TIME: ICD-10-CM

## 2018-12-14 PROCEDURE — 71260 CT THORAX DX C+: CPT

## 2018-12-14 PROCEDURE — 6360000004 HC RX CONTRAST MEDICATION: Performed by: INTERNAL MEDICINE

## 2018-12-14 RX ADMIN — IOPAMIDOL 75 ML: 755 INJECTION, SOLUTION INTRAVENOUS at 12:46

## 2018-12-18 NOTE — PROGRESS NOTES
Follow Up Office Visit   12/19/18    Subjective:  Chief Complaint   Patient presents with    Shortness of Breath     with exertion, hasn't really came out of her bedroom all week     HPI:   Acosta Posey is a 81 y.o. female who presents to the clinic today for follow up. Since her last visit, the patient was moved from a nursing home back to her home-10 days ago. She is living with her  who is requiring 24/7 care - they have assistance who provides this care. She reports that she hasn't come out of her room in over a week. She states that she does not want to do anything. Pt has not had a bath in 4 days until her daughter helped her today. Her daughter reports she is not eating well- pt states she is not hungry. She is using 3 Ensures per day. She reports fatigue for 6 weeks. She states this started after her diagnosis of CHF. Stable fatigue. Little interest in activities at this time. Falls- No falls since discharge from rehab. Short term memory changes- This is stable. Will need MOCA exam in a following visit. HTN/HLD- Continues to follow with cardiology. None of her medications were changed since our last visit. She is following up with the CHF clinic in 1 month. Insomnia- taking magnesium and trazodone with relief. Needs a refill. Hypothyroidism-taking 100 µg of Synthroid daily. This dose was changed about a month ago. We will redraw this level today and adjust accordingly. No side effects per pt. Osteopenia-the patient reports she takes calcium and vitamin D supplements daily. She reports she will never take Fosamax. Wheezing-wheezing was noted on the last physical exam.  Albuterol inhaler was provided as needed last visit- she was unable to use this. Using IS 1 time per day. She had labs completed at the nursing home. She states she would bring these with her to this visit. She was unable to bring them with her today.     Review of Systems   Constitutional: Positive

## 2018-12-19 NOTE — PATIENT INSTRUCTIONS
Increase fluid water intake  Small, frequent meals throughout the day-3 Ensures  Use spacer with inhaler  If less than 100/70 BP tomorrow, hold losartan  Go to ER if symptoms worsen or fail to improve    Patient Education   sertraline  Pronunciation:  SER tra alexis  Brand:  Zoloft  What is the most important information I should know about sertraline? You should not use sertraline if you also take pimozide, or if you are being treated with methylene blue injection. Do not use this medicine if you have used an MAO inhibitor in the past 14 days, such as isocarboxazid, linezolid, methylene blue injection, phenelzine, rasagiline, selegiline, or tranylcypromine. Some young people have thoughts about suicide when first taking an antidepressant. Stay alert to changes in your mood or symptoms. Report any new or worsening symptoms to your doctor. Seek medical attention right away if you have symptoms of serotonin syndrome, such as: agitation, hallucinations, fever, sweating, shivering, fast heart rate, muscle stiffness, twitching, loss of coordination, nausea, vomiting, or diarrhea. What is sertraline? Sertraline is an antidepressant in a group of drugs called selective serotonin reuptake inhibitors (SSRIs). Sertraline affects chemicals in the brain that may be unbalanced in people with depression, panic, anxiety, or obsessive-compulsive symptoms. Sertraline is used to treat depression, obsessive-compulsive disorder, panic disorder, anxiety disorders, post-traumatic stress disorder (PTSD), and premenstrual dysphoric disorder (PMDD). Sertraline may also be used for purposes not listed in this medication guide. What should I discuss with my healthcare provider before taking sertraline? You should not use sertraline if you are allergic to it, or if you also take pimozide.  Do not use the liquid form of sertraline  if you are taking disulfiram (Antabuse) or you could have a severe reaction to the disulfiram.  Do not take diclofenac, indomethacin, meloxicam, and others. Using an NSAID with sertraline may cause you to bruise or bleed easily. This medication may impair your thinking or reactions. Be careful if you drive or do anything that requires you to be alert. What are the possible side effects of sertraline? Get emergency medical help if you have signs of an allergic reaction: skin rash or hives (with or without fever or joint pain); difficulty breathing; swelling of your face, lips, tongue, or throat. Report any new or worsening symptoms to your doctor, such as: mood or behavior changes, anxiety, panic attacks, trouble sleeping, or if you feel impulsive, irritable, agitated, hostile, aggressive, restless, hyperactive (mentally or physically), more depressed, or have thoughts about suicide or hurting yourself. Call your doctor at once if you have:  · a seizure (convulsions);  · blurred vision, tunnel vision, eye pain or swelling;  · low levels of sodium in the body --headache, confusion, memory problems, severe weakness, feeling unsteady; or  · manic episodes --racing thoughts, increased energy, unusual risk-taking behavior, extreme happiness, being irritable or talkative. Seek medical attention right away if you have symptoms of serotonin syndrome, such as: agitation, hallucinations, fever, sweating, shivering, fast heart rate, muscle stiffness, twitching, loss of coordination, nausea, vomiting, or diarrhea. Common side effects may include:  · drowsiness, tiredness, feeling anxious or agitated;  · indigestion, nausea, diarrhea, loss of appetite;  · sweating;  · tremors or shaking;  · sleep problems (insomnia); or  · decreased sex drive, impotence, or difficulty having an orgasm. This is not a complete list of side effects and others may occur. Call your doctor for medical advice about side effects. You may report side effects to FDA at 9-026-FDA-7941. What other drugs will affect sertraline?   Taking sertraline with other drugs that make you sleepy can worsen this effect. Ask your doctor before taking a sleeping pill, narcotic medication, muscle relaxer, or medicine for anxiety, depression, or seizures. Other drugs may interact with sertraline, including prescription and over-the-counter medicines, vitamins, and herbal products. Tell your doctor about all your current medicines and any medicine you start or stop using. Where can I get more information? Your pharmacist can provide more information about sertraline. Remember, keep this and all other medicines out of the reach of children, never share your medicines with others, and use this medication only for the indication prescribed. Every effort has been made to ensure that the information provided by [address] [place] [address]  is accurate, up-to-date, and complete, but no guarantee is made to that effect. Drug information contained herein may be time sensitive. [employer] information has been compiled for use by healthcare practitioners and consumers in the [place] and therefore [employer] does not warrant that uses outside of the [place] are appropriate, unless specifically indicated otherwise. [facility]'s drug information does not endorse drugs, diagnose patients or recommend therapy. [employer] drug information is an informational resource designed to assist licensed healthcare practitioners in caring for their patients and/or to serve consumers viewing this service as a supplement to, and not a substitute for, the expertise, skill, knowledge and judgment of healthcare practitioners. The absence of a warning for a given drug or drug combination in no way should be construed to indicate that the drug or drug combination is safe, effective or appropriate for any given patient. [facility] does not assume any responsibility for any aspect of healthcare administered with the aid of information [facility] provides.  The information contained herein is not intended to cover all possible uses, directions, precautions, warnings, drug interactions, allergic reactions, or adverse effects. If you have questions about the drugs you are taking, check with your doctor, nurse or pharmacist.  Copyright 5271-3265 [address]. Version: 21.01. Revision date: 8/9/2017. Care instructions adapted under license by [facility] ([facility]). If you have questions about a medical condition or this instruction, always ask your healthcare professional. [place] [address] any warranty or liability for your use of this information. Patient Education        Using a Metered-Dose Inhaler: Care Instructions  Your Care Instructions    A metered-dose inhaler lets you breathe medicine into your lungs quickly. Inhaled medicine works faster than the same medicine in a pill. An inhaler allows you to take less medicine than you would need if you took it as a pill. \"Metered-dose\" means that the inhaler gives a measured amount of medicine each time you use it. A metered-dose inhaler gives medicine in the form of a liquid mist.  Your doctor may want you to use a spacer with your inhaler. A spacer is a chamber that you attach to the inhaler. The chamber holds the medicine before you inhale it. That way, you can inhale the medicine in as many breaths as you need. Doctors recommend using a spacer with most metered-dose inhalers, especially those with corticosteroid medicines. Follow-up care is a key part of your treatment and safety. Be sure to make and go to all appointments, and call your doctor if you are having problems. It's also a good idea to know your test results and keep a list of the medicines you take. How can you care for yourself at home? To get started using your inhaler  · Talk with your health care provider to be sure you are using your inhaler the right way. It might help if you practice using it in front of a mirror. Use the inhaler exactly as prescribed.   · Check that you have the correct medicine. If you use more than one inhaler, put a label on each one. This will let you know which one to use at the right time. · Keep track of how much medicine is in the inhaler. Check the label to see how many doses are in the container. If you know how many puffs you can take, you can replace the inhaler before you run out. Ask your health care provider how you can keep track of how much medicine is left. · Use a spacer if you have problems pressing the inhaler and breathing in at the same time. You also may need a spacer if you are using corticosteroid medicines. · If you are using a corticosteroid inhaler, gargle and rinse out your mouth with water after use. Do not swallow the water. Swallowing the water will increase the chance that the medicine will get into your bloodstream. This may make it more likely that you will have side effects. To use a spacer with an inhaler  1. Shake the inhaler. Remove the inhaler cap, and place the mouthpiece of the inhaler into the spacer. Check the inhaler instructions to see if you need to prime your inhaler before you use it. If it needs priming, follow the instructions on how to prime your inhaler. 2. Remove the cap from the spacer. 3. Hold the inhaler upright with the mouthpiece at the bottom. 4. Tilt your head back a little, and breathe out slowly and completely. 5. Place the spacer's mouthpiece in your mouth. 6. Press down on the inhaler to spray one puff of medicine into the spacer, and then start breathing in slowly. Wait to inhale until after you have pressed down on the inhaler. Some spacers have a whistle. If you hear it, you should breathe in more slowly. 7. Hold your breath for 10 seconds. This will let the medicine settle in your lungs. 8. If you need to take a second dose, wait 30 to 60 seconds to allow the inhaler valve to refill. To use an inhaler without a spacer  1. Shake the inhaler as directed. Remove the cap.  Check the instructions to see if

## 2018-12-27 NOTE — PROGRESS NOTES
[facility] - Cardiology      Chief Complaint: \"shortness of breath and fatigue\"    Chief Complaint   Patient presents with    Congestive Heart Failure     denies edema, dizziness, cp    Atrial Fibrillation    Hypertension    Shortness of Breath     with any exertion    Fatigue     very fatigued       History of present illness: Ashok Neumann is an 81 y.o. female with past medical history significant for atrial fib, loop recorder, HTN, HLD. She was hospitalized 10/31-11/3 after presenting with mechanical fall and was found to have compression fracture of spine, underwent kyphoplasty 11/1. Noted to have small bilateral pleural effusions on CXR post procedure and was diuresed. Echo with EF 50-55%. Patient is here today for hospital follow up HFpEF. 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 Patient says she is not doing at all well. She offers c/o fatigue and any activity requires great effort. Notes shortness of breath with minor exertions including ADLs. Sleeps in hospital bed with HOB at 20-30 degrees for comfort and breathing. Currently taking Lasix 40 mg daily (previously 20 mg). Home weight is down 5 lbs since DC. She notes some wooziness at times even with sitting. BP has been low at home in 90s and losartan held if under 331 systolic. Followed by [facility].           Past Medical History:   Diagnosis Date    Arthritis     fingers; osteoarthritis    Atrial fibrillation (HCC)     Cancer ([facility] [address] 2000    breast cancer    Carotid artery stenosis     Chronic kidney disease     active UTI    Depression     Fx ankle 2/4/15    LT    History of blood transfusion     after chemo 2002    Hyperlipidemia     Hypertension     Hypothyroidism     Movement disorder     Palpitation     Psychiatric problem     post of confusion    Shingles     Syncope and collapse     Thyroid disease edema  Neurological: No focal deficits  Psychological: Normal mood and affect    Home Medications:  Current Outpatient Prescriptions   Medication Sig Dispense Refill    potassium chloride (KLOR-CON M) 20 MEQ extended release tablet Take 1 tablet by mouth daily 60 tablet 0    levothyroxine (SYNTHROID) 100 MCG tablet Take 1 tablet by mouth Daily MUST BE BRAND NAME 30 tablet 1    sertraline (ZOLOFT) 50 MG tablet Take 1 tablet by mouth daily 30 tablet 1    magnesium hydroxide (MILK OF MAGNESIA) 400 MG/5ML suspension Take by mouth daily as needed for Constipation      acetaminophen (TYLENOL) 325 MG tablet Take 650 mg by mouth every 6 hours as needed for Pain      albuterol sulfate  (90 Base) MCG/ACT inhaler Inhale 1 puff into the lungs every 6 hours as needed for Wheezing or Shortness of Breath 1 Inhaler 1    furosemide (LASIX) 20 MG tablet Take 1 tablet by mouth daily (Patient taking differently: Take 40 mg by mouth daily ) 60 tablet 3    losartan (COZAAR) 50 MG tablet Take 50 mg by mouth daily       atorvastatin (LIPITOR) 20 MG tablet Take 1 tablet by mouth daily 90 tablet 3    diltiazem (CARDIZEM CD) 180 MG extended release capsule Take 1 capsule by mouth daily 30 capsule 11    rivaroxaban (XARELTO) 20 MG TABS tablet Take 1 tablet by mouth daily 30 tablet 11    Coenzyme Q10 (CO Q 10 PO) Take 1 tablet by mouth daily       Calcium-Phosphorus-Vitamin D (CITRACAL +D3 PO) Take by mouth      Spacer/Aero Chamber Mouthpiece MISC 1 each by Does not apply route once as needed (inhaler use) 1 each 0     No current facility-administered medications for this visit.         Labs:   Lab Results   Component Value Date    WBC 3.8 (L) 12/19/2018    HGB 13.6 12/19/2018    HCT 39.8 12/19/2018    .5 (H) 12/19/2018     12/19/2018     Lab Results   Component Value Date     12/19/2018    K 4.7 12/19/2018    K 4.2 10/29/2018    CL 99 12/19/2018    CO2 26 12/19/2018    BUN 27 12/19/2018    CREATININE 1.0
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
## 2019-01-01 ENCOUNTER — TELEPHONE (OUTPATIENT)
Dept: CARDIOLOGY CLINIC | Age: 84
End: 2019-01-01

## 2019-01-01 ENCOUNTER — OFFICE VISIT (OUTPATIENT)
Dept: INTERNAL MEDICINE CLINIC | Age: 84
End: 2019-01-01
Payer: MEDICARE

## 2019-01-01 ENCOUNTER — TELEPHONE (OUTPATIENT)
Dept: INTERNAL MEDICINE CLINIC | Age: 84
End: 2019-01-01

## 2019-01-01 ENCOUNTER — NURSE ONLY (OUTPATIENT)
Dept: CARDIOLOGY CLINIC | Age: 84
End: 2019-01-01
Payer: MEDICARE

## 2019-01-01 ENCOUNTER — OFFICE VISIT (OUTPATIENT)
Dept: CARDIOLOGY CLINIC | Age: 84
End: 2019-01-01
Payer: MEDICARE

## 2019-01-01 ENCOUNTER — HOSPITAL ENCOUNTER (INPATIENT)
Age: 84
LOS: 9 days | Discharge: NURSING, REHAB OR LONG-TERM CARE | DRG: 811 | End: 2019-07-11
Attending: INTERNAL MEDICINE | Admitting: INTERNAL MEDICINE
Payer: MEDICARE

## 2019-01-01 ENCOUNTER — APPOINTMENT (OUTPATIENT)
Dept: GENERAL RADIOLOGY | Age: 84
DRG: 811 | End: 2019-01-01
Attending: INTERNAL MEDICINE
Payer: MEDICARE

## 2019-01-01 ENCOUNTER — HOSPITAL ENCOUNTER (OUTPATIENT)
Dept: GENERAL RADIOLOGY | Age: 84
Discharge: HOME | End: 2019-06-07
Payer: MEDICARE

## 2019-01-01 ENCOUNTER — HOSPITAL ENCOUNTER (OUTPATIENT)
Dept: INTERVENTIONAL RADIOLOGY/VASCULAR | Age: 84
Discharge: HOME | End: 2019-06-07
Payer: MEDICARE

## 2019-01-01 ENCOUNTER — TELEPHONE (OUTPATIENT)
Dept: GENERAL RADIOLOGY | Age: 84
End: 2019-01-01

## 2019-01-01 ENCOUNTER — OFFICE VISIT (OUTPATIENT)
Dept: PSYCHOLOGY | Age: 84
End: 2019-01-01
Payer: MEDICARE

## 2019-01-01 ENCOUNTER — HOSPITAL ENCOUNTER (OUTPATIENT)
Dept: GENERAL RADIOLOGY | Age: 84
Discharge: HOME | End: 2019-02-14
Payer: MEDICARE

## 2019-01-01 ENCOUNTER — HOSPITAL ENCOUNTER (OUTPATIENT)
Age: 84
Discharge: HOME | End: 2019-02-14
Payer: MEDICARE

## 2019-01-01 ENCOUNTER — APPOINTMENT (OUTPATIENT)
Dept: CT IMAGING | Age: 84
DRG: 811 | End: 2019-01-01
Attending: INTERNAL MEDICINE
Payer: MEDICARE

## 2019-01-01 VITALS
OXYGEN SATURATION: 95 % | RESPIRATION RATE: 17 BRPM | DIASTOLIC BLOOD PRESSURE: 50 MMHG | HEART RATE: 87 BPM | SYSTOLIC BLOOD PRESSURE: 92 MMHG | HEIGHT: 69 IN | WEIGHT: 151.4 LBS | BODY MASS INDEX: 22.42 KG/M2

## 2019-01-01 VITALS
SYSTOLIC BLOOD PRESSURE: 123 MMHG | WEIGHT: 142 LBS | BODY MASS INDEX: 21.03 KG/M2 | HEART RATE: 69 BPM | HEIGHT: 69 IN | OXYGEN SATURATION: 94 % | DIASTOLIC BLOOD PRESSURE: 58 MMHG | TEMPERATURE: 98.1 F | RESPIRATION RATE: 18 BRPM

## 2019-01-01 VITALS
DIASTOLIC BLOOD PRESSURE: 56 MMHG | BODY MASS INDEX: 22.96 KG/M2 | HEIGHT: 69 IN | RESPIRATION RATE: 16 BRPM | SYSTOLIC BLOOD PRESSURE: 108 MMHG | HEART RATE: 79 BPM | WEIGHT: 155 LBS | OXYGEN SATURATION: 96 %

## 2019-01-01 VITALS
OXYGEN SATURATION: 94 % | SYSTOLIC BLOOD PRESSURE: 130 MMHG | DIASTOLIC BLOOD PRESSURE: 72 MMHG | HEART RATE: 80 BPM | HEIGHT: 69 IN | TEMPERATURE: 97.4 F | BODY MASS INDEX: 22.93 KG/M2 | WEIGHT: 154.8 LBS

## 2019-01-01 VITALS
BODY MASS INDEX: 22.07 KG/M2 | DIASTOLIC BLOOD PRESSURE: 72 MMHG | HEIGHT: 69 IN | HEART RATE: 72 BPM | SYSTOLIC BLOOD PRESSURE: 128 MMHG | OXYGEN SATURATION: 96 % | WEIGHT: 149 LBS

## 2019-01-01 VITALS
BODY MASS INDEX: 22.69 KG/M2 | OXYGEN SATURATION: 98 % | SYSTOLIC BLOOD PRESSURE: 112 MMHG | DIASTOLIC BLOOD PRESSURE: 72 MMHG | HEIGHT: 69 IN | TEMPERATURE: 98.3 F | HEART RATE: 68 BPM

## 2019-01-01 VITALS
WEIGHT: 154.6 LBS | HEIGHT: 69 IN | DIASTOLIC BLOOD PRESSURE: 78 MMHG | BODY MASS INDEX: 22.9 KG/M2 | HEART RATE: 80 BPM | SYSTOLIC BLOOD PRESSURE: 120 MMHG

## 2019-01-01 VITALS
TEMPERATURE: 97.2 F | DIASTOLIC BLOOD PRESSURE: 74 MMHG | HEIGHT: 69 IN | OXYGEN SATURATION: 97 % | HEART RATE: 84 BPM | SYSTOLIC BLOOD PRESSURE: 106 MMHG | BODY MASS INDEX: 22.22 KG/M2 | WEIGHT: 150 LBS

## 2019-01-01 VITALS
SYSTOLIC BLOOD PRESSURE: 140 MMHG | TEMPERATURE: 98.1 F | RESPIRATION RATE: 16 BRPM | DIASTOLIC BLOOD PRESSURE: 70 MMHG | HEIGHT: 68 IN | WEIGHT: 139.9 LBS | OXYGEN SATURATION: 96 % | HEART RATE: 83 BPM | BODY MASS INDEX: 21.2 KG/M2

## 2019-01-01 VITALS
WEIGHT: 144 LBS | SYSTOLIC BLOOD PRESSURE: 110 MMHG | BODY MASS INDEX: 21.82 KG/M2 | HEIGHT: 68 IN | HEART RATE: 80 BPM | DIASTOLIC BLOOD PRESSURE: 62 MMHG

## 2019-01-01 VITALS
WEIGHT: 156.8 LBS | DIASTOLIC BLOOD PRESSURE: 72 MMHG | BODY MASS INDEX: 23.22 KG/M2 | OXYGEN SATURATION: 97 % | HEART RATE: 78 BPM | RESPIRATION RATE: 15 BRPM | SYSTOLIC BLOOD PRESSURE: 120 MMHG | HEIGHT: 69 IN

## 2019-01-01 VITALS
DIASTOLIC BLOOD PRESSURE: 64 MMHG | WEIGHT: 147 LBS | SYSTOLIC BLOOD PRESSURE: 100 MMHG | HEART RATE: 78 BPM | BODY MASS INDEX: 22.35 KG/M2

## 2019-01-01 VITALS
WEIGHT: 151 LBS | DIASTOLIC BLOOD PRESSURE: 70 MMHG | SYSTOLIC BLOOD PRESSURE: 112 MMHG | HEART RATE: 76 BPM | BODY MASS INDEX: 22.63 KG/M2

## 2019-01-01 DIAGNOSIS — I25.10 CORONARY ARTERY CALCIFICATION: ICD-10-CM

## 2019-01-01 DIAGNOSIS — R55 SYNCOPE, UNSPECIFIED SYNCOPE TYPE: ICD-10-CM

## 2019-01-01 DIAGNOSIS — E78.2 MIXED HYPERLIPIDEMIA: Chronic | ICD-10-CM

## 2019-01-01 DIAGNOSIS — E55.9 VITAMIN D DEFICIENCY: ICD-10-CM

## 2019-01-01 DIAGNOSIS — D46.9 ANEMIA ASSOC WITH MYELODYSPLASTIC SYNDROME TREATED WITH ERYTHROPOIETIN (HCC): ICD-10-CM

## 2019-01-01 DIAGNOSIS — Z45.09 ENCOUNTER FOR LOOP RECORDER CHECK: ICD-10-CM

## 2019-01-01 DIAGNOSIS — R06.02 SOB (SHORTNESS OF BREATH): ICD-10-CM

## 2019-01-01 DIAGNOSIS — I25.84 CORONARY ARTERY CALCIFICATION: ICD-10-CM

## 2019-01-01 DIAGNOSIS — I48.0 PAF (PAROXYSMAL ATRIAL FIBRILLATION) (HCC): ICD-10-CM

## 2019-01-01 DIAGNOSIS — I48.0 PAROXYSMAL ATRIAL FIBRILLATION (HCC): ICD-10-CM

## 2019-01-01 DIAGNOSIS — E03.8 OTHER SPECIFIED HYPOTHYROIDISM: ICD-10-CM

## 2019-01-01 DIAGNOSIS — I10 ESSENTIAL HYPERTENSION: Chronic | ICD-10-CM

## 2019-01-01 DIAGNOSIS — R06.02 SHORTNESS OF BREATH: ICD-10-CM

## 2019-01-01 DIAGNOSIS — I25.10 CORONARY ARTERY DISEASE INVOLVING NATIVE CORONARY ARTERY OF NATIVE HEART WITHOUT ANGINA PECTORIS: ICD-10-CM

## 2019-01-01 DIAGNOSIS — Z45.09 ENCOUNTER FOR ELECTRONIC ANALYSIS OF REVEAL EVENT RECORDER: ICD-10-CM

## 2019-01-01 DIAGNOSIS — R53.83 FATIGUE, UNSPECIFIED TYPE: ICD-10-CM

## 2019-01-01 DIAGNOSIS — I48.0 PAROXYSMAL ATRIAL FIBRILLATION (HCC): Primary | ICD-10-CM

## 2019-01-01 DIAGNOSIS — E03.8 OTHER SPECIFIED HYPOTHYROIDISM: Primary | ICD-10-CM

## 2019-01-01 DIAGNOSIS — H61.23 BILATERAL IMPACTED CERUMEN: ICD-10-CM

## 2019-01-01 DIAGNOSIS — R55 SYNCOPE AND COLLAPSE: ICD-10-CM

## 2019-01-01 DIAGNOSIS — M25.552 LEFT HIP PAIN: ICD-10-CM

## 2019-01-01 DIAGNOSIS — I50.30 (HFPEF) HEART FAILURE WITH PRESERVED EJECTION FRACTION (HCC): Primary | ICD-10-CM

## 2019-01-01 DIAGNOSIS — R05.8 DRY COUGH: ICD-10-CM

## 2019-01-01 DIAGNOSIS — D63.0 ANEMIA ASSOC WITH MYELODYSPLASTIC SYNDROME TREATED WITH ERYTHROPOIETIN (HCC): ICD-10-CM

## 2019-01-01 DIAGNOSIS — Z13.31 POSITIVE DEPRESSION SCREENING: ICD-10-CM

## 2019-01-01 DIAGNOSIS — F32.9 CURRENT EPISODE OF MAJOR DEPRESSIVE DISORDER WITHOUT PRIOR EPISODE, UNSPECIFIED DEPRESSION EPISODE SEVERITY: Primary | ICD-10-CM

## 2019-01-01 DIAGNOSIS — J95.811 POSTPROCEDURAL PNEUMOTHORAX: ICD-10-CM

## 2019-01-01 DIAGNOSIS — R06.2 WHEEZING: ICD-10-CM

## 2019-01-01 DIAGNOSIS — R53.1 WEAKNESS: ICD-10-CM

## 2019-01-01 DIAGNOSIS — M85.89 OSTEOPENIA OF MULTIPLE SITES: ICD-10-CM

## 2019-01-01 DIAGNOSIS — D46.9 MDS (MYELODYSPLASTIC SYNDROME) (HCC): ICD-10-CM

## 2019-01-01 DIAGNOSIS — R05.9 COUGH: ICD-10-CM

## 2019-01-01 DIAGNOSIS — Z91.81 HISTORY OF FALL: ICD-10-CM

## 2019-01-01 DIAGNOSIS — I65.29 STENOSIS OF CAROTID ARTERY, UNSPECIFIED LATERALITY: Chronic | ICD-10-CM

## 2019-01-01 DIAGNOSIS — F32.1 CURRENT MODERATE EPISODE OF MAJOR DEPRESSIVE DISORDER WITHOUT PRIOR EPISODE (HCC): Primary | ICD-10-CM

## 2019-01-01 DIAGNOSIS — F51.01 PRIMARY INSOMNIA: ICD-10-CM

## 2019-01-01 DIAGNOSIS — J01.10 ACUTE NON-RECURRENT FRONTAL SINUSITIS: ICD-10-CM

## 2019-01-01 DIAGNOSIS — R05.9 COUGH: Primary | ICD-10-CM

## 2019-01-01 DIAGNOSIS — R45.89 DYSPHORIC MOOD: ICD-10-CM

## 2019-01-01 DIAGNOSIS — M25.552 LEFT HIP PAIN: Primary | ICD-10-CM

## 2019-01-01 DIAGNOSIS — F32.9 CURRENT EPISODE OF MAJOR DEPRESSIVE DISORDER WITHOUT PRIOR EPISODE, UNSPECIFIED DEPRESSION EPISODE SEVERITY: ICD-10-CM

## 2019-01-01 LAB
A/G RATIO: 0.9 (ref 1.1–2.2)
A/G RATIO: 1 (ref 1.1–2.2)
A/G RATIO: 1 (ref 1.1–2.2)
A/G RATIO: 1.1 (ref 1.1–2.2)
A/G RATIO: 1.2 (ref 1.1–2.2)
A/G RATIO: 1.3 (ref 1.1–2.2)
ABO/RH: NORMAL
ACANTHOCYTES: ABNORMAL
ALBUMIN SERPL-MCNC: 3 G/DL (ref 3.4–5)
ALBUMIN SERPL-MCNC: 3 G/DL (ref 3.4–5)
ALBUMIN SERPL-MCNC: 3.1 G/DL (ref 3.4–5)
ALBUMIN SERPL-MCNC: 3.2 G/DL (ref 3.4–5)
ALBUMIN SERPL-MCNC: 3.2 G/DL (ref 3.4–5)
ALBUMIN SERPL-MCNC: 3.4 G/DL (ref 3.4–5)
ALBUMIN SERPL-MCNC: 3.4 G/DL (ref 3.4–5)
ALBUMIN SERPL-MCNC: 3.5 G/DL (ref 3.4–5)
ALBUMIN SERPL-MCNC: 3.5 G/DL (ref 3.4–5)
ALBUMIN SERPL-MCNC: 3.8 G/DL (ref 3.4–5)
ALP BLD-CCNC: 51 U/L (ref 40–129)
ALP BLD-CCNC: 51 U/L (ref 40–129)
ALP BLD-CCNC: 55 U/L (ref 40–129)
ALP BLD-CCNC: 55 U/L (ref 40–129)
ALP BLD-CCNC: 56 U/L (ref 40–129)
ALP BLD-CCNC: 59 U/L (ref 40–129)
ALP BLD-CCNC: 61 U/L (ref 40–129)
ALP BLD-CCNC: 65 U/L (ref 40–129)
ALP BLD-CCNC: 66 U/L (ref 40–129)
ALP BLD-CCNC: 82 U/L (ref 40–129)
ALT SERPL-CCNC: 10 U/L (ref 10–40)
ALT SERPL-CCNC: 10 U/L (ref 10–40)
ALT SERPL-CCNC: 13 U/L (ref 10–40)
ALT SERPL-CCNC: 7 U/L (ref 10–40)
ALT SERPL-CCNC: 7 U/L (ref 10–40)
ALT SERPL-CCNC: 8 U/L (ref 10–40)
ALT SERPL-CCNC: 9 U/L (ref 10–40)
ANION GAP SERPL CALCULATED.3IONS-SCNC: 10 MMOL/L (ref 3–16)
ANION GAP SERPL CALCULATED.3IONS-SCNC: 11 MMOL/L (ref 3–16)
ANION GAP SERPL CALCULATED.3IONS-SCNC: 11 MMOL/L (ref 3–16)
ANION GAP SERPL CALCULATED.3IONS-SCNC: 5 MMOL/L (ref 3–16)
ANION GAP SERPL CALCULATED.3IONS-SCNC: 6 MMOL/L (ref 3–16)
ANION GAP SERPL CALCULATED.3IONS-SCNC: 7 MMOL/L (ref 3–16)
ANION GAP SERPL CALCULATED.3IONS-SCNC: 7 MMOL/L (ref 3–16)
ANION GAP SERPL CALCULATED.3IONS-SCNC: 9 MMOL/L (ref 3–16)
ANION GAP SERPL CALCULATED.3IONS-SCNC: 9 MMOL/L (ref 3–16)
ANISOCYTOSIS: ABNORMAL
ANTIBODY SCREEN: NORMAL
APTT: 35.8 SEC (ref 26–36)
AST SERPL-CCNC: 13 U/L (ref 15–37)
AST SERPL-CCNC: 13 U/L (ref 15–37)
AST SERPL-CCNC: 14 U/L (ref 15–37)
AST SERPL-CCNC: 15 U/L (ref 15–37)
AST SERPL-CCNC: 16 U/L (ref 15–37)
AST SERPL-CCNC: 16 U/L (ref 15–37)
AST SERPL-CCNC: 18 U/L (ref 15–37)
AST SERPL-CCNC: 18 U/L (ref 15–37)
AST SERPL-CCNC: 21 U/L (ref 15–37)
AST SERPL-CCNC: 22 U/L (ref 15–37)
ATYPICAL LYMPHOCYTE RELATIVE PERCENT: 1 % (ref 0–6)
ATYPICAL LYMPHOCYTE RELATIVE PERCENT: 1 % (ref 0–6)
ATYPICAL LYMPHOCYTE RELATIVE PERCENT: 2 % (ref 0–6)
B-TYPE NATRIURETIC PEPTIDE: 173 PG/ML
B-TYPE NATRIURETIC PEPTIDE: NORMAL PG/ML
BANDED NEUTROPHILS RELATIVE PERCENT: 1 % (ref 0–7)
BANDED NEUTROPHILS RELATIVE PERCENT: 1 % (ref 0–7)
BANDED NEUTROPHILS RELATIVE PERCENT: 4 % (ref 0–7)
BANDED NEUTROPHILS RELATIVE PERCENT: 4 % (ref 0–7)
BANDED NEUTROPHILS RELATIVE PERCENT: 7 % (ref 0–7)
BANDED NEUTROPHILS RELATIVE PERCENT: 8 % (ref 0–7)
BASOPHILIC STIPPLING: ABNORMAL
BASOPHILIC STIPPLING: ABNORMAL
BASOPHILS ABSOLUTE: 0 K/UL (ref 0–0.2)
BASOPHILS RELATIVE PERCENT: 0 %
BASOPHILS RELATIVE PERCENT: 2 %
BASOPHILS RELATIVE PERCENT: 2.6 %
BASOPHILS RELATIVE PERCENT: 2.9 %
BASOPHILS RELATIVE PERCENT: 3 %
BILIRUB SERPL-MCNC: 1 MG/DL (ref 0–1)
BILIRUB SERPL-MCNC: 1 MG/DL (ref 0–1)
BILIRUB SERPL-MCNC: 1.1 MG/DL (ref 0–1)
BILIRUB SERPL-MCNC: 1.5 MG/DL (ref 0–1)
BILIRUB SERPL-MCNC: 1.7 MG/DL (ref 0–1)
BILIRUB SERPL-MCNC: 2.1 MG/DL (ref 0–1)
BILIRUB SERPL-MCNC: 2.4 MG/DL (ref 0–1)
BILIRUB SERPL-MCNC: 2.5 MG/DL (ref 0–1)
BILIRUBIN DIRECT: 0.4 MG/DL (ref 0–0.3)
BILIRUBIN, INDIRECT: 1.3 MG/DL (ref 0–1)
BLASTS RELATIVE PERCENT: 1 %
BLOOD BANK DISPENSE STATUS: NORMAL
BLOOD BANK DISPENSE STATUS: NORMAL
BLOOD BANK PRODUCT CODE: NORMAL
BLOOD BANK PRODUCT CODE: NORMAL
BPU ID: NORMAL
BPU ID: NORMAL
BUN / CREAT RATIO: NORMAL (CALC) (ref 6–22)
BUN BLDV-MCNC: 11 MG/DL (ref 7–20)
BUN BLDV-MCNC: 11 MG/DL (ref 7–20)
BUN BLDV-MCNC: 12 MG/DL (ref 7–20)
BUN BLDV-MCNC: 12 MG/DL (ref 7–20)
BUN BLDV-MCNC: 13 MG/DL (ref 7–20)
BUN BLDV-MCNC: 14 MG/DL (ref 7–20)
BUN BLDV-MCNC: 15 MG/DL (ref 7–25)
BUN BLDV-MCNC: 16 MG/DL (ref 7–20)
BUN BLDV-MCNC: 18 MG/DL (ref 7–20)
BUN BLDV-MCNC: 18 MG/DL (ref 7–20)
BUN BLDV-MCNC: 21 MG/DL (ref 7–20)
BUN BLDV-MCNC: 26 MG/DL (ref 7–20)
BURR CELLS: ABNORMAL
CALCIUM SERPL-MCNC: 8.3 MG/DL (ref 8.3–10.6)
CALCIUM SERPL-MCNC: 8.3 MG/DL (ref 8.3–10.6)
CALCIUM SERPL-MCNC: 8.4 MG/DL (ref 8.3–10.6)
CALCIUM SERPL-MCNC: 8.4 MG/DL (ref 8.3–10.6)
CALCIUM SERPL-MCNC: 8.7 MG/DL (ref 8.3–10.6)
CALCIUM SERPL-MCNC: 8.7 MG/DL (ref 8.3–10.6)
CALCIUM SERPL-MCNC: 8.8 MG/DL
CALCIUM SERPL-MCNC: 8.8 MG/DL (ref 8.3–10.6)
CALCIUM SERPL-MCNC: 9.2 MG/DL (ref 8.3–10.6)
CALCIUM SERPL-MCNC: 9.2 MG/DL (ref 8.3–10.6)
CALCIUM SERPL-MCNC: 9.3 MG/DL (ref 8.3–10.6)
CALCIUM SERPL-MCNC: 9.5 MG/DL (ref 8.3–10.6)
CHLORIDE BLD-SCNC: 100 MMOL/L (ref 99–110)
CHLORIDE BLD-SCNC: 100 MMOL/L (ref 99–110)
CHLORIDE BLD-SCNC: 101 MMOL/L (ref 99–110)
CHLORIDE BLD-SCNC: 103 MMOL/L (ref 99–110)
CHLORIDE BLD-SCNC: 104 MMOL/L (ref 99–110)
CHLORIDE BLD-SCNC: 105 MMOL/L (ref 98–110)
CHLORIDE BLD-SCNC: 108 MMOL/L (ref 99–110)
CHLORIDE BLD-SCNC: 108 MMOL/L (ref 99–110)
CHLORIDE BLD-SCNC: 97 MMOL/L (ref 99–110)
CHLORIDE BLD-SCNC: 98 MMOL/L (ref 99–110)
CHLORIDE BLD-SCNC: 98 MMOL/L (ref 99–110)
CHLORIDE BLD-SCNC: 99 MMOL/L (ref 99–110)
CO2: 24 MMOL/L (ref 21–32)
CO2: 24 MMOL/L (ref 21–32)
CO2: 26 MMOL/L (ref 21–32)
CO2: 27 MMOL/L (ref 21–32)
CO2: 28 MMOL/L (ref 21–32)
CO2: 29 MMOL/L (ref 20–32)
CO2: 29 MMOL/L (ref 21–32)
CO2: 30 MMOL/L (ref 21–32)
CO2: 30 MMOL/L (ref 21–32)
CREAT SERPL-MCNC: 0.5 MG/DL (ref 0.6–1.2)
CREAT SERPL-MCNC: 0.6 MG/DL (ref 0.6–1.2)
CREAT SERPL-MCNC: 0.6 MG/DL (ref 0.6–1.2)
CREAT SERPL-MCNC: 0.7 MG/DL (ref 0.6–1.2)
CREAT SERPL-MCNC: 0.72 MG/DL
CREAT SERPL-MCNC: 0.8 MG/DL (ref 0.6–1.2)
CREAT SERPL-MCNC: <0.5 MG/DL (ref 0.6–1.2)
DAT POLYSPECIFIC: NORMAL
DESCRIPTION BLOOD BANK: NORMAL
DESCRIPTION BLOOD BANK: NORMAL
DIGOXIN LEVEL: <0.3 NG/ML (ref 0.8–2)
DOHLE BODIES: PRESENT
EOSINOPHILS ABSOLUTE: 0 K/UL (ref 0–0.6)
EOSINOPHILS ABSOLUTE: 0.1 K/UL (ref 0–0.6)
EOSINOPHILS ABSOLUTE: 0.1 K/UL (ref 0–0.6)
EOSINOPHILS RELATIVE PERCENT: 0 %
EOSINOPHILS RELATIVE PERCENT: 1 %
EOSINOPHILS RELATIVE PERCENT: 1 %
EOSINOPHILS RELATIVE PERCENT: 1.6 %
EOSINOPHILS RELATIVE PERCENT: 2 %
EOSINOPHILS RELATIVE PERCENT: 2 %
EOSINOPHILS RELATIVE PERCENT: 2.2 %
EOSINOPHILS RELATIVE PERCENT: 3 %
EOSINOPHILS RELATIVE PERCENT: 8 %
ERYTHROPOIETIN: 279 MU/ML (ref 4–27)
GFR AFRICAN AMERICAN: >60
GFR NON-AFRICAN AMERICAN: >60
GLOBULIN: 2.8 G/DL
GLOBULIN: 2.8 G/DL
GLOBULIN: 2.9 G/DL
GLOBULIN: 3.2 G/DL
GLOBULIN: 3.3 G/DL
GLOBULIN: 3.8 G/DL
GLOBULIN: 3.8 G/DL
GLUCOSE BLD-MCNC: 100 MG/DL (ref 70–99)
GLUCOSE BLD-MCNC: 103 MG/DL (ref 70–99)
GLUCOSE BLD-MCNC: 104 MG/DL (ref 70–99)
GLUCOSE BLD-MCNC: 106 MG/DL (ref 70–99)
GLUCOSE BLD-MCNC: 109 MG/DL (ref 70–99)
GLUCOSE BLD-MCNC: 111 MG/DL (ref 70–99)
GLUCOSE BLD-MCNC: 118 MG/DL (ref 70–99)
GLUCOSE BLD-MCNC: 91 MG/DL (ref 65–99)
GLUCOSE BLD-MCNC: 91 MG/DL (ref 70–99)
GLUCOSE BLD-MCNC: 92 MG/DL (ref 70–99)
GLUCOSE BLD-MCNC: 94 MG/DL (ref 70–99)
GLUCOSE BLD-MCNC: 96 MG/DL (ref 70–99)
HAPTOGLOBIN: 14 MG/DL (ref 30–200)
HCT VFR BLD CALC: 21.5 % (ref 36–48)
HCT VFR BLD CALC: 21.9 % (ref 36–48)
HCT VFR BLD CALC: 22.5 % (ref 36–48)
HCT VFR BLD CALC: 24.4 % (ref 36–48)
HCT VFR BLD CALC: 26.2 % (ref 36–48)
HCT VFR BLD CALC: 27.1 % (ref 36–48)
HCT VFR BLD CALC: 27.3 % (ref 36–48)
HCT VFR BLD CALC: 27.6 % (ref 36–48)
HCT VFR BLD CALC: 29.4 % (ref 36–48)
HCT VFR BLD CALC: 30.4 % (ref 36–48)
HCT VFR BLD CALC: 33.2 % (ref 36–48)
HEMATOLOGY PATH CONSULT: NO
HEMATOLOGY PATH CONSULT: NORMAL
HEMATOLOGY PATH CONSULT: NORMAL
HEMATOLOGY PATH CONSULT: YES
HEMATOLOGY PATH CONSULT: YES
HEMOGLOBIN: 10.1 G/DL (ref 12–16)
HEMOGLOBIN: 10.3 G/DL (ref 12–16)
HEMOGLOBIN: 11.3 G/DL (ref 12–16)
HEMOGLOBIN: 7.4 G/DL (ref 12–16)
HEMOGLOBIN: 7.5 G/DL (ref 12–16)
HEMOGLOBIN: 7.8 G/DL (ref 12–16)
HEMOGLOBIN: 8.4 G/DL (ref 12–16)
HEMOGLOBIN: 9.3 G/DL (ref 12–16)
HEMOGLOBIN: 9.5 G/DL (ref 12–16)
HYPOCHROMIA: ABNORMAL
HYPOCHROMIA: ABNORMAL
IMMATURE RETIC FRACT: 0.34 (ref 0.21–0.37)
INR BLD: 1.25 (ref 0.86–1.14)
L. PNEUMOPHILA SEROGP 1 UR AG: NORMAL
LACTATE DEHYDROGENASE: 321 U/L (ref 100–190)
LV EF: 55 %
LVEF MODALITY: NORMAL
LYMPHOCYTES ABSOLUTE: 0.4 K/UL (ref 1–5.1)
LYMPHOCYTES ABSOLUTE: 0.5 K/UL (ref 1–5.1)
LYMPHOCYTES ABSOLUTE: 0.6 K/UL (ref 1–5.1)
LYMPHOCYTES ABSOLUTE: 0.6 K/UL (ref 1–5.1)
LYMPHOCYTES ABSOLUTE: 0.7 K/UL (ref 1–5.1)
LYMPHOCYTES ABSOLUTE: 1.2 K/UL (ref 1–5.1)
LYMPHOCYTES ABSOLUTE: 1.3 K/UL (ref 1–5.1)
LYMPHOCYTES ABSOLUTE: 1.5 K/UL (ref 1–5.1)
LYMPHOCYTES ABSOLUTE: 1.6 K/UL (ref 1–5.1)
LYMPHOCYTES RELATIVE PERCENT: 21 %
LYMPHOCYTES RELATIVE PERCENT: 23 %
LYMPHOCYTES RELATIVE PERCENT: 28 %
LYMPHOCYTES RELATIVE PERCENT: 30 %
LYMPHOCYTES RELATIVE PERCENT: 40 %
LYMPHOCYTES RELATIVE PERCENT: 42 %
LYMPHOCYTES RELATIVE PERCENT: 42.1 %
LYMPHOCYTES RELATIVE PERCENT: 45 %
LYMPHOCYTES RELATIVE PERCENT: 45 %
LYMPHOCYTES RELATIVE PERCENT: 52.5 %
LYMPHOCYTES RELATIVE PERCENT: 64 %
MACROCYTES: ABNORMAL
MCH RBC QN AUTO: 33.8 PG (ref 26–34)
MCH RBC QN AUTO: 34.1 PG (ref 26–34)
MCH RBC QN AUTO: 34.2 PG (ref 26–34)
MCH RBC QN AUTO: 34.3 PG (ref 26–34)
MCH RBC QN AUTO: 34.6 PG (ref 26–34)
MCH RBC QN AUTO: 34.8 PG (ref 26–34)
MCH RBC QN AUTO: 34.8 PG (ref 26–34)
MCH RBC QN AUTO: 34.9 PG (ref 26–34)
MCH RBC QN AUTO: 35 PG (ref 26–34)
MCH RBC QN AUTO: 35.2 PG (ref 26–34)
MCH RBC QN AUTO: 35.4 PG (ref 26–34)
MCHC RBC AUTO-ENTMCNC: 33.9 G/DL (ref 31–36)
MCHC RBC AUTO-ENTMCNC: 34 G/DL (ref 31–36)
MCHC RBC AUTO-ENTMCNC: 34.1 G/DL (ref 31–36)
MCHC RBC AUTO-ENTMCNC: 34.4 G/DL (ref 31–36)
MCHC RBC AUTO-ENTMCNC: 34.5 G/DL (ref 31–36)
MCHC RBC AUTO-ENTMCNC: 34.6 G/DL (ref 31–36)
MCHC RBC AUTO-ENTMCNC: 35.3 G/DL (ref 31–36)
MCV RBC AUTO: 100.2 FL (ref 80–100)
MCV RBC AUTO: 100.6 FL (ref 80–100)
MCV RBC AUTO: 100.9 FL (ref 80–100)
MCV RBC AUTO: 101 FL (ref 80–100)
MCV RBC AUTO: 101.1 FL (ref 80–100)
MCV RBC AUTO: 101.7 FL (ref 80–100)
MCV RBC AUTO: 102.4 FL (ref 80–100)
MCV RBC AUTO: 102.8 FL (ref 80–100)
MCV RBC AUTO: 98.4 FL (ref 80–100)
MCV RBC AUTO: 99.2 FL (ref 80–100)
MCV RBC AUTO: 99.4 FL (ref 80–100)
METAMYELOCYTES RELATIVE PERCENT: 1 %
METAMYELOCYTES RELATIVE PERCENT: 1 %
MONOCYTES ABSOLUTE: 0 K/UL (ref 0–1.3)
MONOCYTES ABSOLUTE: 0.1 K/UL (ref 0–1.3)
MONOCYTES ABSOLUTE: 0.2 K/UL (ref 0–1.3)
MONOCYTES ABSOLUTE: 0.2 K/UL (ref 0–1.3)
MONOCYTES ABSOLUTE: 0.3 K/UL (ref 0–1.3)
MONOCYTES ABSOLUTE: 0.4 K/UL (ref 0–1.3)
MONOCYTES ABSOLUTE: 0.6 K/UL (ref 0–1.3)
MONOCYTES RELATIVE PERCENT: 10 %
MONOCYTES RELATIVE PERCENT: 10 %
MONOCYTES RELATIVE PERCENT: 11 %
MONOCYTES RELATIVE PERCENT: 12.1 %
MONOCYTES RELATIVE PERCENT: 16 %
MONOCYTES RELATIVE PERCENT: 16 %
MONOCYTES RELATIVE PERCENT: 3 %
MONOCYTES RELATIVE PERCENT: 4 %
MONOCYTES RELATIVE PERCENT: 5 %
MONOCYTES RELATIVE PERCENT: 5.1 %
MONOCYTES RELATIVE PERCENT: 6 %
MYELOCYTE PERCENT: 1 %
MYELOCYTE PERCENT: 1 %
MYELOCYTE PERCENT: 2 %
NEUTROPHILS ABSOLUTE: 0.2 K/UL (ref 1.7–7.7)
NEUTROPHILS ABSOLUTE: 0.4 K/UL (ref 1.7–7.7)
NEUTROPHILS ABSOLUTE: 0.5 K/UL (ref 1.7–7.7)
NEUTROPHILS ABSOLUTE: 0.6 K/UL (ref 1.7–7.7)
NEUTROPHILS ABSOLUTE: 0.7 K/UL (ref 1.7–7.7)
NEUTROPHILS ABSOLUTE: 0.8 K/UL (ref 1.7–7.7)
NEUTROPHILS ABSOLUTE: 1.4 K/UL (ref 1.7–7.7)
NEUTROPHILS ABSOLUTE: 1.6 K/UL (ref 1.7–7.7)
NEUTROPHILS ABSOLUTE: 2.2 K/UL (ref 1.7–7.7)
NEUTROPHILS ABSOLUTE: 2.4 K/UL (ref 1.7–7.7)
NEUTROPHILS ABSOLUTE: 4.1 K/UL (ref 1.7–7.7)
NEUTROPHILS RELATIVE PERCENT: 19 %
NEUTROPHILS RELATIVE PERCENT: 30.9 %
NEUTROPHILS RELATIVE PERCENT: 37 %
NEUTROPHILS RELATIVE PERCENT: 37 %
NEUTROPHILS RELATIVE PERCENT: 39 %
NEUTROPHILS RELATIVE PERCENT: 43 %
NEUTROPHILS RELATIVE PERCENT: 48 %
NEUTROPHILS RELATIVE PERCENT: 55 %
NEUTROPHILS RELATIVE PERCENT: 60 %
NEUTROPHILS RELATIVE PERCENT: 64 %
NEUTROPHILS RELATIVE PERCENT: 69 %
NUCLEATED RED BLOOD CELLS: 1 /100 WBC
NUCLEATED RED BLOOD CELLS: 5 /100 WBC
OVALOCYTES: ABNORMAL
PDW BLD-RTO: 16 % (ref 12.4–15.4)
PDW BLD-RTO: 16.8 % (ref 12.4–15.4)
PDW BLD-RTO: 16.8 % (ref 12.4–15.4)
PDW BLD-RTO: 16.9 % (ref 12.4–15.4)
PDW BLD-RTO: 17 % (ref 12.4–15.4)
PDW BLD-RTO: 17.1 % (ref 12.4–15.4)
PDW BLD-RTO: 17.1 % (ref 12.4–15.4)
PDW BLD-RTO: 17.2 % (ref 12.4–15.4)
PDW BLD-RTO: 17.6 % (ref 12.4–15.4)
PDW BLD-RTO: 18.1 % (ref 12.4–15.4)
PDW BLD-RTO: 18.5 % (ref 12.4–15.4)
PLATELET # BLD: 14 K/UL (ref 135–450)
PLATELET # BLD: 15 K/UL (ref 135–450)
PLATELET # BLD: 17 K/UL (ref 135–450)
PLATELET # BLD: 17 K/UL (ref 135–450)
PLATELET # BLD: 19 K/UL (ref 135–450)
PLATELET # BLD: 20 K/UL (ref 135–450)
PLATELET # BLD: 20 K/UL (ref 135–450)
PLATELET # BLD: 25 K/UL (ref 135–450)
PLATELET # BLD: 29 K/UL (ref 135–450)
PLATELET # BLD: 34 K/UL (ref 135–450)
PLATELET # BLD: 69 K/UL (ref 135–450)
PLATELET SLIDE REVIEW: ABNORMAL
PMV BLD AUTO: 10.2 FL (ref 5–10.5)
PMV BLD AUTO: 10.5 FL (ref 5–10.5)
PMV BLD AUTO: 10.5 FL (ref 5–10.5)
PMV BLD AUTO: 10.6 FL (ref 5–10.5)
PMV BLD AUTO: 10.8 FL (ref 5–10.5)
PMV BLD AUTO: 11 FL (ref 5–10.5)
PMV BLD AUTO: 11.1 FL (ref 5–10.5)
PMV BLD AUTO: 11.1 FL (ref 5–10.5)
PMV BLD AUTO: 11.2 FL (ref 5–10.5)
PMV BLD AUTO: 11.2 FL (ref 5–10.5)
PMV BLD AUTO: 12 FL (ref 5–10.5)
POIKILOCYTES: ABNORMAL
POLYCHROMASIA: ABNORMAL
POTASSIUM REFLEX MAGNESIUM: 3.6 MMOL/L (ref 3.5–5.1)
POTASSIUM REFLEX MAGNESIUM: 3.7 MMOL/L (ref 3.5–5.1)
POTASSIUM REFLEX MAGNESIUM: 3.7 MMOL/L (ref 3.5–5.1)
POTASSIUM REFLEX MAGNESIUM: 3.8 MMOL/L (ref 3.5–5.1)
POTASSIUM REFLEX MAGNESIUM: 3.9 MMOL/L (ref 3.5–5.1)
POTASSIUM REFLEX MAGNESIUM: 3.9 MMOL/L (ref 3.5–5.1)
POTASSIUM REFLEX MAGNESIUM: 4.1 MMOL/L (ref 3.5–5.1)
POTASSIUM SERPL-SCNC: 4 MMOL/L (ref 3.5–5.3)
POTASSIUM SERPL-SCNC: 4.4 MMOL/L (ref 3.5–5.1)
PRO-BNP: 1501 PG/ML (ref 0–449)
PROCALCITONIN: 0.06 NG/ML (ref 0–0.15)
PROTHROMBIN TIME: 14.3 SEC (ref 9.8–13)
RBC # BLD: 2.1 M/UL (ref 4–5.2)
RBC # BLD: 2.14 M/UL (ref 4–5.2)
RBC # BLD: 2.22 M/UL (ref 4–5.2)
RBC # BLD: 2.42 M/UL (ref 4–5.2)
RBC # BLD: 2.66 M/UL (ref 4–5.2)
RBC # BLD: 2.72 M/UL (ref 4–5.2)
RBC # BLD: 2.74 M/UL (ref 4–5.2)
RBC # BLD: 2.74 M/UL (ref 4–5.2)
RBC # BLD: 2.9 M/UL (ref 4–5.2)
RBC # BLD: 3.01 M/UL (ref 4–5.2)
RBC # BLD: 3.34 M/UL (ref 4–5.2)
REPORT: NORMAL
RESPIRATORY PANEL PCR: NORMAL
RETICULOCYTE ABSOLUTE COUNT: 0.12 M/UL (ref 0.02–0.1)
RETICULOCYTE COUNT PCT: 5.18 % (ref 0.5–2.18)
SCHISTOCYTES: ABNORMAL
SLIDE REVIEW: ABNORMAL
SMUDGE CELLS: PRESENT
SODIUM BLD-SCNC: 135 MMOL/L (ref 136–145)
SODIUM BLD-SCNC: 135 MMOL/L (ref 136–145)
SODIUM BLD-SCNC: 136 MMOL/L (ref 136–145)
SODIUM BLD-SCNC: 136 MMOL/L (ref 136–145)
SODIUM BLD-SCNC: 137 MMOL/L (ref 136–145)
SODIUM BLD-SCNC: 137 MMOL/L (ref 136–145)
SODIUM BLD-SCNC: 138 MMOL/L (ref 136–145)
SODIUM BLD-SCNC: 139 MMOL/L (ref 136–145)
SODIUM BLD-SCNC: 140 MMOL/L (ref 135–146)
STREP PNEUMONIAE ANTIGEN, URINE: NORMAL
TARGET CELLS: ABNORMAL
TEAR DROP CELLS: ABNORMAL
TOTAL PROTEIN: 5.9 G/DL (ref 6.4–8.2)
TOTAL PROTEIN: 5.9 G/DL (ref 6.4–8.2)
TOTAL PROTEIN: 6 G/DL (ref 6.4–8.2)
TOTAL PROTEIN: 6.1 G/DL (ref 6.4–8.2)
TOTAL PROTEIN: 6.2 G/DL (ref 6.4–8.2)
TOTAL PROTEIN: 6.3 G/DL (ref 6.4–8.2)
TOTAL PROTEIN: 6.7 G/DL (ref 6.4–8.2)
TOTAL PROTEIN: 6.7 G/DL (ref 6.4–8.2)
TOTAL PROTEIN: 7.3 G/DL (ref 6.4–8.2)
TOTAL PROTEIN: 7.3 G/DL (ref 6.4–8.2)
TOXIC GRANULATION: PRESENT
TSH SERPL DL<=0.05 MIU/L-ACNC: 0.71 UIU/ML (ref 0.27–4.2)
WBC # BLD: 1 K/UL (ref 4–11)
WBC # BLD: 1.1 K/UL (ref 4–11)
WBC # BLD: 1.2 K/UL (ref 4–11)
WBC # BLD: 1.3 K/UL (ref 4–11)
WBC # BLD: 1.3 K/UL (ref 4–11)
WBC # BLD: 1.7 K/UL (ref 4–11)
WBC # BLD: 3.2 K/UL (ref 4–11)
WBC # BLD: 3.5 K/UL (ref 4–11)
WBC # BLD: 3.5 K/UL (ref 4–11)
WBC # BLD: 4 K/UL (ref 4–11)
WBC # BLD: 5.6 K/UL (ref 4–11)

## 2019-01-01 PROCEDURE — 6360000002 HC RX W HCPCS: Performed by: INTERNAL MEDICINE

## 2019-01-01 PROCEDURE — 2580000003 HC RX 258: Performed by: INTERNAL MEDICINE

## 2019-01-01 PROCEDURE — 1036F TOBACCO NON-USER: CPT | Performed by: NURSE PRACTITIONER

## 2019-01-01 PROCEDURE — 99222 1ST HOSP IP/OBS MODERATE 55: CPT | Performed by: INTERNAL MEDICINE

## 2019-01-01 PROCEDURE — 80053 COMPREHEN METABOLIC PANEL: CPT

## 2019-01-01 PROCEDURE — 1090F PRES/ABSN URINE INCON ASSESS: CPT | Performed by: NURSE PRACTITIONER

## 2019-01-01 PROCEDURE — 6370000000 HC RX 637 (ALT 250 FOR IP): Performed by: INTERNAL MEDICINE

## 2019-01-01 PROCEDURE — 99213 OFFICE O/P EST LOW 20 MIN: CPT | Performed by: NURSE PRACTITIONER

## 2019-01-01 PROCEDURE — 92526 ORAL FUNCTION THERAPY: CPT

## 2019-01-01 PROCEDURE — G8420 CALC BMI NORM PARAMETERS: HCPCS | Performed by: NURSE PRACTITIONER

## 2019-01-01 PROCEDURE — 6360000002 HC RX W HCPCS: Performed by: NURSE PRACTITIONER

## 2019-01-01 PROCEDURE — 99232 SBSQ HOSP IP/OBS MODERATE 35: CPT | Performed by: NURSE PRACTITIONER

## 2019-01-01 PROCEDURE — 85730 THROMBOPLASTIN TIME PARTIAL: CPT

## 2019-01-01 PROCEDURE — G8484 FLU IMMUNIZE NO ADMIN: HCPCS | Performed by: NURSE PRACTITIONER

## 2019-01-01 PROCEDURE — 76937 US GUIDE VASCULAR ACCESS: CPT

## 2019-01-01 PROCEDURE — 87581 M.PNEUMON DNA AMP PROBE: CPT

## 2019-01-01 PROCEDURE — 2580000003 HC RX 258: Performed by: NURSE PRACTITIONER

## 2019-01-01 PROCEDURE — G8420 CALC BMI NORM PARAMETERS: HCPCS | Performed by: INTERNAL MEDICINE

## 2019-01-01 PROCEDURE — 1101F PT FALLS ASSESS-DOCD LE1/YR: CPT | Performed by: NURSE PRACTITIONER

## 2019-01-01 PROCEDURE — 93298 REM INTERROG DEV EVAL SCRMS: CPT | Performed by: INTERNAL MEDICINE

## 2019-01-01 PROCEDURE — 1200000000 HC SEMI PRIVATE

## 2019-01-01 PROCEDURE — C1769 GUIDE WIRE: HCPCS

## 2019-01-01 PROCEDURE — G8598 ASA/ANTIPLAT THER USED: HCPCS | Performed by: NURSE PRACTITIONER

## 2019-01-01 PROCEDURE — 4040F PNEUMOC VAC/ADMIN/RCVD: CPT | Performed by: NURSE PRACTITIONER

## 2019-01-01 PROCEDURE — 85025 COMPLETE CBC W/AUTO DIFF WBC: CPT

## 2019-01-01 PROCEDURE — 86880 COOMBS TEST DIRECT: CPT

## 2019-01-01 PROCEDURE — G8427 DOCREV CUR MEDS BY ELIG CLIN: HCPCS | Performed by: NURSE PRACTITIONER

## 2019-01-01 PROCEDURE — G8431 POS CLIN DEPRES SCRN F/U DOC: HCPCS | Performed by: NURSE PRACTITIONER

## 2019-01-01 PROCEDURE — 93299 PR REM INTERROG ICPMS/SCRMS <30 D TECH REVIEW: CPT | Performed by: INTERNAL MEDICINE

## 2019-01-01 PROCEDURE — 6370000000 HC RX 637 (ALT 250 FOR IP): Performed by: NURSE PRACTITIONER

## 2019-01-01 PROCEDURE — 80048 BASIC METABOLIC PNL TOTAL CA: CPT

## 2019-01-01 PROCEDURE — G8399 PT W/DXA RESULTS DOCUMENT: HCPCS | Performed by: NURSE PRACTITIONER

## 2019-01-01 PROCEDURE — 84145 PROCALCITONIN (PCT): CPT

## 2019-01-01 PROCEDURE — P9016 RBC LEUKOCYTES REDUCED: HCPCS

## 2019-01-01 PROCEDURE — 83615 LACTATE (LD) (LDH) ENZYME: CPT

## 2019-01-01 PROCEDURE — 99232 SBSQ HOSP IP/OBS MODERATE 35: CPT | Performed by: INTERNAL MEDICINE

## 2019-01-01 PROCEDURE — 94760 N-INVAS EAR/PLS OXIMETRY 1: CPT

## 2019-01-01 PROCEDURE — 36415 COLL VENOUS BLD VENIPUNCTURE: CPT

## 2019-01-01 PROCEDURE — 7100000011 HC PHASE II RECOVERY - ADDTL 15 MIN

## 2019-01-01 PROCEDURE — 87486 CHLMYD PNEUM DNA AMP PROBE: CPT

## 2019-01-01 PROCEDURE — 71046 X-RAY EXAM CHEST 2 VIEWS: CPT

## 2019-01-01 PROCEDURE — 97530 THERAPEUTIC ACTIVITIES: CPT

## 2019-01-01 PROCEDURE — 92610 EVALUATE SWALLOWING FUNCTION: CPT

## 2019-01-01 PROCEDURE — 1123F ACP DISCUSS/DSCN MKR DOCD: CPT | Performed by: NURSE PRACTITIONER

## 2019-01-01 PROCEDURE — 77001 FLUOROGUIDE FOR VEIN DEVICE: CPT

## 2019-01-01 PROCEDURE — 1036F TOBACCO NON-USER: CPT | Performed by: INTERNAL MEDICINE

## 2019-01-01 PROCEDURE — 2700000000 HC OXYGEN THERAPY PER DAY

## 2019-01-01 PROCEDURE — 73502 X-RAY EXAM HIP UNI 2-3 VIEWS: CPT

## 2019-01-01 PROCEDURE — 36430 TRANSFUSION BLD/BLD COMPNT: CPT

## 2019-01-01 PROCEDURE — 36591 DRAW BLOOD OFF VENOUS DEVICE: CPT

## 2019-01-01 PROCEDURE — 99214 OFFICE O/P EST MOD 30 MIN: CPT | Performed by: NURSE PRACTITIONER

## 2019-01-01 PROCEDURE — 99214 OFFICE O/P EST MOD 30 MIN: CPT | Performed by: INTERNAL MEDICINE

## 2019-01-01 PROCEDURE — G8598 ASA/ANTIPLAT THER USED: HCPCS | Performed by: INTERNAL MEDICINE

## 2019-01-01 PROCEDURE — 86923 COMPATIBILITY TEST ELECTRIC: CPT

## 2019-01-01 PROCEDURE — 94640 AIRWAY INHALATION TREATMENT: CPT

## 2019-01-01 PROCEDURE — 94640 AIRWAY INHALATION TREATMENT: CPT | Performed by: NURSE PRACTITIONER

## 2019-01-01 PROCEDURE — 6370000000 HC RX 637 (ALT 250 FOR IP): Performed by: RADIOLOGY

## 2019-01-01 PROCEDURE — 80162 ASSAY OF DIGOXIN TOTAL: CPT

## 2019-01-01 PROCEDURE — 87633 RESP VIRUS 12-25 TARGETS: CPT

## 2019-01-01 PROCEDURE — 7100000010 HC PHASE II RECOVERY - FIRST 15 MIN

## 2019-01-01 PROCEDURE — 71250 CT THORAX DX C-: CPT

## 2019-01-01 PROCEDURE — 1090F PRES/ABSN URINE INCON ASSESS: CPT | Performed by: INTERNAL MEDICINE

## 2019-01-01 PROCEDURE — 82668 ASSAY OF ERYTHROPOIETIN: CPT

## 2019-01-01 PROCEDURE — 86901 BLOOD TYPING SEROLOGIC RH(D): CPT

## 2019-01-01 PROCEDURE — 1123F ACP DISCUSS/DSCN MKR DOCD: CPT | Performed by: INTERNAL MEDICINE

## 2019-01-01 PROCEDURE — 90832 PSYTX W PT 30 MINUTES: CPT | Performed by: PSYCHOLOGIST

## 2019-01-01 PROCEDURE — 83880 ASSAY OF NATRIURETIC PEPTIDE: CPT

## 2019-01-01 PROCEDURE — 86900 BLOOD TYPING SEROLOGIC ABO: CPT

## 2019-01-01 PROCEDURE — 86850 RBC ANTIBODY SCREEN: CPT

## 2019-01-01 PROCEDURE — 99223 1ST HOSP IP/OBS HIGH 75: CPT | Performed by: INTERNAL MEDICINE

## 2019-01-01 PROCEDURE — 99233 SBSQ HOSP IP/OBS HIGH 50: CPT | Performed by: INTERNAL MEDICINE

## 2019-01-01 PROCEDURE — 36592 COLLECT BLOOD FROM PICC: CPT

## 2019-01-01 PROCEDURE — 94761 N-INVAS EAR/PLS OXIMETRY MLT: CPT

## 2019-01-01 PROCEDURE — 4040F PNEUMOC VAC/ADMIN/RCVD: CPT | Performed by: INTERNAL MEDICINE

## 2019-01-01 PROCEDURE — 87798 DETECT AGENT NOS DNA AMP: CPT

## 2019-01-01 PROCEDURE — G8427 DOCREV CUR MEDS BY ELIG CLIN: HCPCS | Performed by: INTERNAL MEDICINE

## 2019-01-01 PROCEDURE — G0444 DEPRESSION SCREEN ANNUAL: HCPCS | Performed by: NURSE PRACTITIONER

## 2019-01-01 PROCEDURE — G8399 PT W/DXA RESULTS DOCUMENT: HCPCS | Performed by: INTERNAL MEDICINE

## 2019-01-01 PROCEDURE — 85610 PROTHROMBIN TIME: CPT

## 2019-01-01 PROCEDURE — 85045 AUTOMATED RETICULOCYTE COUNT: CPT

## 2019-01-01 PROCEDURE — 87449 NOS EACH ORGANISM AG IA: CPT

## 2019-01-01 PROCEDURE — 93000 ELECTROCARDIOGRAM COMPLETE: CPT | Performed by: NURSE PRACTITIONER

## 2019-01-01 PROCEDURE — 2500000003 HC RX 250 WO HCPCS: Performed by: RADIOLOGY

## 2019-01-01 PROCEDURE — 97162 PT EVAL MOD COMPLEX 30 MIN: CPT

## 2019-01-01 PROCEDURE — 71045 X-RAY EXAM CHEST 1 VIEW: CPT

## 2019-01-01 PROCEDURE — 83010 ASSAY OF HAPTOGLOBIN QUANT: CPT

## 2019-01-01 PROCEDURE — 6360000002 HC RX W HCPCS: Performed by: RADIOLOGY

## 2019-01-01 PROCEDURE — 93306 TTE W/DOPPLER COMPLETE: CPT

## 2019-01-01 PROCEDURE — 36561 INSERT TUNNELED CV CATH: CPT

## 2019-01-01 RX ORDER — CEFAZOLIN SODIUM 2 G/100ML
2 INJECTION, SOLUTION INTRAVENOUS ONCE
Status: COMPLETED | OUTPATIENT
Start: 2019-01-01 | End: 2019-01-01

## 2019-01-01 RX ORDER — LIDOCAINE 50 MG/G
PATCH TOPICAL
COMMUNITY
Start: 2019-01-01

## 2019-01-01 RX ORDER — SENNA PLUS 8.6 MG/1
2 TABLET ORAL NIGHTLY PRN
Status: DISCONTINUED | OUTPATIENT
Start: 2019-01-01 | End: 2019-01-01

## 2019-01-01 RX ORDER — SODIUM CHLORIDE 9 MG/ML
INJECTION, SOLUTION INTRAVENOUS CONTINUOUS
Status: DISCONTINUED | OUTPATIENT
Start: 2019-01-01 | End: 2019-01-01

## 2019-01-01 RX ORDER — AMOXICILLIN AND CLAVULANATE POTASSIUM 500; 125 MG/1; MG/1
1 TABLET, FILM COATED ORAL EVERY 8 HOURS SCHEDULED
Status: DISCONTINUED | OUTPATIENT
Start: 2019-01-01 | End: 2019-01-01 | Stop reason: HOSPADM

## 2019-01-01 RX ORDER — LEVOTHYROXINE SODIUM 0.1 MG/1
100 TABLET ORAL DAILY
Qty: 30 TABLET | Refills: 1 | Status: SHIPPED | OUTPATIENT
Start: 2019-01-01 | End: 2019-01-01 | Stop reason: SDUPTHER

## 2019-01-01 RX ORDER — LEVOTHYROXINE SODIUM 0.1 MG/1
100 TABLET ORAL DAILY
Status: DISCONTINUED | OUTPATIENT
Start: 2019-01-01 | End: 2019-01-01 | Stop reason: HOSPADM

## 2019-01-01 RX ORDER — DIGOXIN 125 MCG
62.5 TABLET ORAL DAILY
Qty: 15 TABLET | Refills: 1 | Status: SHIPPED | OUTPATIENT
Start: 2019-01-01 | End: 2019-01-01 | Stop reason: SDUPTHER

## 2019-01-01 RX ORDER — MEGESTROL ACETATE 40 MG/ML
400 SUSPENSION ORAL DAILY
Qty: 240 ML | Refills: 3 | Status: SHIPPED | OUTPATIENT
Start: 2019-01-01

## 2019-01-01 RX ORDER — FUROSEMIDE 20 MG/1
20 TABLET ORAL DAILY
Qty: 60 TABLET | Refills: 3 | Status: SHIPPED | OUTPATIENT
Start: 2019-01-01

## 2019-01-01 RX ORDER — POLYETHYLENE GLYCOL 3350 17 G/17G
17 POWDER, FOR SOLUTION ORAL DAILY
Status: DISCONTINUED | OUTPATIENT
Start: 2019-01-01 | End: 2019-01-01

## 2019-01-01 RX ORDER — DIGOXIN 125 MCG
62.5 TABLET ORAL DAILY
Qty: 15 TABLET | Refills: 1 | Status: ON HOLD | OUTPATIENT
Start: 2019-01-01 | End: 2019-01-01 | Stop reason: HOSPADM

## 2019-01-01 RX ORDER — BENZONATATE 100 MG/1
100 CAPSULE ORAL 3 TIMES DAILY PRN
Qty: 30 CAPSULE | Refills: 0 | Status: SHIPPED | OUTPATIENT
Start: 2019-01-01 | End: 2019-01-01 | Stop reason: SDUPTHER

## 2019-01-01 RX ORDER — SPIRONOLACTONE 25 MG/1
12.5 TABLET ORAL DAILY
Qty: 15 TABLET | Refills: 0 | Status: SHIPPED | OUTPATIENT
Start: 2019-01-01 | End: 2019-01-01 | Stop reason: SDUPTHER

## 2019-01-01 RX ORDER — LIDOCAINE 4 G/G
1 PATCH TOPICAL DAILY
Status: DISCONTINUED | OUTPATIENT
Start: 2019-01-01 | End: 2019-01-01 | Stop reason: HOSPADM

## 2019-01-01 RX ORDER — MELATONIN
1000 DAILY
Qty: 90 TABLET | Refills: 1 | COMMUNITY
Start: 2019-01-01

## 2019-01-01 RX ORDER — SODIUM CHLORIDE 0.9 % (FLUSH) 0.9 %
10 SYRINGE (ML) INJECTION EVERY 12 HOURS SCHEDULED
Status: DISCONTINUED | OUTPATIENT
Start: 2019-01-01 | End: 2019-01-01 | Stop reason: HOSPADM

## 2019-01-01 RX ORDER — SODIUM CHLORIDE 9 MG/ML
INJECTION, SOLUTION INTRAVENOUS
Status: DISPENSED
Start: 2019-01-01 | End: 2019-01-01

## 2019-01-01 RX ORDER — ACETAMINOPHEN 325 MG/1
650 TABLET ORAL EVERY 6 HOURS PRN
Status: DISCONTINUED | OUTPATIENT
Start: 2019-01-01 | End: 2019-01-01 | Stop reason: HOSPADM

## 2019-01-01 RX ORDER — DIGOXIN 125 MCG
125 TABLET ORAL DAILY
Status: DISCONTINUED | OUTPATIENT
Start: 2019-01-01 | End: 2019-01-01 | Stop reason: HOSPADM

## 2019-01-01 RX ORDER — DILTIAZEM HYDROCHLORIDE 180 MG/1
180 CAPSULE, COATED, EXTENDED RELEASE ORAL DAILY
Qty: 90 CAPSULE | Refills: 3 | Status: SHIPPED | OUTPATIENT
Start: 2019-01-01

## 2019-01-01 RX ORDER — LOSARTAN POTASSIUM 50 MG/1
25 TABLET ORAL DAILY
Qty: 30 TABLET | Refills: 1 | Status: CANCELLED
Start: 2019-01-01

## 2019-01-01 RX ORDER — MEGESTROL ACETATE 40 MG/ML
400 SUSPENSION ORAL DAILY
Status: DISCONTINUED | OUTPATIENT
Start: 2019-01-01 | End: 2019-01-01 | Stop reason: HOSPADM

## 2019-01-01 RX ORDER — AMOXICILLIN AND CLAVULANATE POTASSIUM 500; 125 MG/1; MG/1
1 TABLET, FILM COATED ORAL EVERY 8 HOURS SCHEDULED
Qty: 30 TABLET | Refills: 0 | Status: SHIPPED | OUTPATIENT
Start: 2019-01-01 | End: 2019-01-01

## 2019-01-01 RX ORDER — LEVOTHYROXINE SODIUM 0.1 MG/1
100 TABLET ORAL DAILY
Qty: 90 TABLET | Refills: 0 | Status: SHIPPED | OUTPATIENT
Start: 2019-01-01

## 2019-01-01 RX ORDER — BENZONATATE 100 MG/1
100 CAPSULE ORAL 3 TIMES DAILY PRN
Qty: 30 CAPSULE | Refills: 0 | Status: SHIPPED | OUTPATIENT
Start: 2019-01-01 | End: 2019-01-01

## 2019-01-01 RX ORDER — CEFEPIME HYDROCHLORIDE 2 G/1
2 INJECTION, POWDER, FOR SOLUTION INTRAVENOUS EVERY 8 HOURS
Status: DISCONTINUED | OUTPATIENT
Start: 2019-01-01 | End: 2019-01-01

## 2019-01-01 RX ORDER — LIDOCAINE HYDROCHLORIDE 10 MG/ML
10 INJECTION, SOLUTION EPIDURAL; INFILTRATION; INTRACAUDAL; PERINEURAL ONCE
Status: COMPLETED | OUTPATIENT
Start: 2019-01-01 | End: 2019-01-01

## 2019-01-01 RX ORDER — TRAZODONE HYDROCHLORIDE 50 MG/1
25 TABLET ORAL NIGHTLY PRN
Status: DISCONTINUED | OUTPATIENT
Start: 2019-01-01 | End: 2019-01-01 | Stop reason: HOSPADM

## 2019-01-01 RX ORDER — SERTRALINE HYDROCHLORIDE 100 MG/1
100 TABLET, FILM COATED ORAL DAILY
Qty: 30 TABLET | Refills: 1 | Status: SHIPPED | OUTPATIENT
Start: 2019-01-01 | End: 2019-01-01 | Stop reason: SDUPTHER

## 2019-01-01 RX ORDER — SENNA PLUS 8.6 MG/1
2 TABLET ORAL NIGHTLY
Status: DISCONTINUED | OUTPATIENT
Start: 2019-01-01 | End: 2019-01-01 | Stop reason: HOSPADM

## 2019-01-01 RX ORDER — SERTRALINE HYDROCHLORIDE 100 MG/1
100 TABLET, FILM COATED ORAL DAILY
Qty: 90 TABLET | Refills: 0 | Status: SHIPPED | OUTPATIENT
Start: 2019-01-01 | End: 2019-01-01 | Stop reason: SDUPTHER

## 2019-01-01 RX ORDER — ALBUTEROL SULFATE 90 UG/1
1 AEROSOL, METERED RESPIRATORY (INHALATION) EVERY 6 HOURS PRN
Status: DISCONTINUED | OUTPATIENT
Start: 2019-01-01 | End: 2019-01-01 | Stop reason: HOSPADM

## 2019-01-01 RX ORDER — 0.9 % SODIUM CHLORIDE 0.9 %
250 INTRAVENOUS SOLUTION INTRAVENOUS ONCE
Status: COMPLETED | OUTPATIENT
Start: 2019-01-01 | End: 2019-01-01

## 2019-01-01 RX ORDER — SODIUM CHLORIDE 0.9 % (FLUSH) 0.9 %
10 SYRINGE (ML) INJECTION PRN
Status: DISCONTINUED | OUTPATIENT
Start: 2019-01-01 | End: 2019-01-01 | Stop reason: HOSPADM

## 2019-01-01 RX ORDER — DIPHENHYDRAMINE HYDROCHLORIDE 50 MG/ML
25 INJECTION INTRAMUSCULAR; INTRAVENOUS ONCE
Status: COMPLETED | OUTPATIENT
Start: 2019-01-01 | End: 2019-01-01

## 2019-01-01 RX ORDER — SENNA PLUS 8.6 MG/1
2 TABLET ORAL NIGHTLY
Status: DISCONTINUED | OUTPATIENT
Start: 2019-01-01 | End: 2019-01-01

## 2019-01-01 RX ORDER — ALBUTEROL SULFATE 2.5 MG/3ML
2.5 SOLUTION RESPIRATORY (INHALATION) ONCE
Status: COMPLETED | OUTPATIENT
Start: 2019-01-01 | End: 2019-01-01

## 2019-01-01 RX ORDER — ONDANSETRON 2 MG/ML
4 INJECTION INTRAMUSCULAR; INTRAVENOUS EVERY 6 HOURS PRN
Status: DISCONTINUED | OUTPATIENT
Start: 2019-01-01 | End: 2019-01-01 | Stop reason: HOSPADM

## 2019-01-01 RX ORDER — BACTERIOSTATIC SODIUM CHLORIDE 0.9 %
15 VIAL (ML) INJECTION PRN
Status: DISCONTINUED | OUTPATIENT
Start: 2019-01-01 | End: 2019-01-01 | Stop reason: HOSPADM

## 2019-01-01 RX ORDER — POTASSIUM CHLORIDE 7.45 MG/ML
10 INJECTION INTRAVENOUS PRN
Status: DISCONTINUED | OUTPATIENT
Start: 2019-01-01 | End: 2019-01-01 | Stop reason: HOSPADM

## 2019-01-01 RX ORDER — SPIRONOLACTONE 25 MG/1
25 TABLET ORAL DAILY
Qty: 30 TABLET | Refills: 2 | Status: SHIPPED | OUTPATIENT
Start: 2019-01-01

## 2019-01-01 RX ORDER — LEVOTHYROXINE SODIUM 0.1 MG/1
100 TABLET ORAL DAILY
Qty: 90 TABLET | Refills: 0 | Status: SHIPPED | OUTPATIENT
Start: 2019-01-01 | End: 2019-01-01 | Stop reason: SDUPTHER

## 2019-01-01 RX ORDER — LEVOTHYROXINE SODIUM 0.1 MG/1
100 TABLET ORAL DAILY
Qty: 30 TABLET | Refills: 1 | Status: CANCELLED | OUTPATIENT
Start: 2019-01-01

## 2019-01-01 RX ORDER — SENNA PLUS 8.6 MG/1
1 TABLET ORAL DAILY PRN
Status: DISCONTINUED | OUTPATIENT
Start: 2019-01-01 | End: 2019-01-01

## 2019-01-01 RX ORDER — PREDNISONE 20 MG/1
40 TABLET ORAL DAILY
Qty: 10 TABLET | Refills: 0 | Status: SHIPPED | OUTPATIENT
Start: 2019-01-01 | End: 2019-01-01

## 2019-01-01 RX ORDER — FUROSEMIDE 10 MG/ML
20 INJECTION INTRAMUSCULAR; INTRAVENOUS 2 TIMES DAILY
Status: DISCONTINUED | OUTPATIENT
Start: 2019-01-01 | End: 2019-01-01

## 2019-01-01 RX ORDER — ATORVASTATIN CALCIUM 20 MG/1
20 TABLET, FILM COATED ORAL DAILY
Qty: 90 TABLET | Refills: 3 | Status: CANCELLED | OUTPATIENT
Start: 2019-01-01

## 2019-01-01 RX ORDER — FUROSEMIDE 10 MG/ML
20 INJECTION INTRAMUSCULAR; INTRAVENOUS ONCE
Status: COMPLETED | OUTPATIENT
Start: 2019-01-01 | End: 2019-01-01

## 2019-01-01 RX ORDER — FUROSEMIDE 20 MG/1
20 TABLET ORAL DAILY
Status: DISCONTINUED | OUTPATIENT
Start: 2019-01-01 | End: 2019-01-01 | Stop reason: HOSPADM

## 2019-01-01 RX ORDER — LIDOCAINE HYDROCHLORIDE AND EPINEPHRINE BITARTRATE 20; .01 MG/ML; MG/ML
20 INJECTION, SOLUTION SUBCUTANEOUS ONCE
Status: COMPLETED | OUTPATIENT
Start: 2019-01-01 | End: 2019-01-01

## 2019-01-01 RX ORDER — DIGOXIN 125 MCG
62.5 TABLET ORAL DAILY
Status: DISCONTINUED | OUTPATIENT
Start: 2019-01-01 | End: 2019-01-01

## 2019-01-01 RX ORDER — SERTRALINE HYDROCHLORIDE 100 MG/1
100 TABLET, FILM COATED ORAL DAILY
Qty: 90 TABLET | Refills: 0 | Status: SHIPPED | OUTPATIENT
Start: 2019-01-01

## 2019-01-01 RX ORDER — ACETAMINOPHEN 325 MG/1
650 TABLET ORAL EVERY 4 HOURS PRN
Status: DISCONTINUED | OUTPATIENT
Start: 2019-01-01 | End: 2019-01-01 | Stop reason: HOSPADM

## 2019-01-01 RX ORDER — DILTIAZEM HYDROCHLORIDE 180 MG/1
180 CAPSULE, COATED, EXTENDED RELEASE ORAL DAILY
Status: DISCONTINUED | OUTPATIENT
Start: 2019-01-01 | End: 2019-01-01

## 2019-01-01 RX ORDER — DIAPER,BRIEF,INFANT-TODD,DISP
EACH MISCELLANEOUS 2 TIMES DAILY
Status: DISCONTINUED | OUTPATIENT
Start: 2019-01-01 | End: 2019-01-01 | Stop reason: HOSPADM

## 2019-01-01 RX ORDER — SPIRONOLACTONE 25 MG/1
25 TABLET ORAL DAILY
Status: DISCONTINUED | OUTPATIENT
Start: 2019-01-01 | End: 2019-01-01

## 2019-01-01 RX ORDER — ALBUTEROL SULFATE 90 UG/1
1 AEROSOL, METERED RESPIRATORY (INHALATION) EVERY 6 HOURS PRN
Qty: 1 INHALER | Refills: 1 | Status: SHIPPED | OUTPATIENT
Start: 2019-01-01

## 2019-01-01 RX ORDER — ACETAMINOPHEN 80 MG
TABLET,CHEWABLE ORAL
Status: DISPENSED
Start: 2019-01-01 | End: 2019-01-01

## 2019-01-01 RX ORDER — HYDROCODONE BITARTRATE AND ACETAMINOPHEN 5; 325 MG/1; MG/1
1 TABLET ORAL EVERY 6 HOURS PRN
Status: DISCONTINUED | OUTPATIENT
Start: 2019-01-01 | End: 2019-01-01 | Stop reason: HOSPADM

## 2019-01-01 RX ORDER — MAGNESIUM SULFATE 1 G/100ML
1 INJECTION INTRAVENOUS PRN
Status: DISCONTINUED | OUTPATIENT
Start: 2019-01-01 | End: 2019-01-01 | Stop reason: HOSPADM

## 2019-01-01 RX ORDER — LACTULOSE 10 G/15ML
20 SOLUTION ORAL 3 TIMES DAILY
Status: DISCONTINUED | OUTPATIENT
Start: 2019-01-01 | End: 2019-01-01

## 2019-01-01 RX ORDER — LOSARTAN POTASSIUM 50 MG/1
50 TABLET ORAL DAILY
Qty: 30 TABLET | Refills: 1 | Status: CANCELLED | OUTPATIENT
Start: 2019-01-01

## 2019-01-01 RX ORDER — FUROSEMIDE 10 MG/ML
20 INJECTION INTRAMUSCULAR; INTRAVENOUS DAILY
Status: DISCONTINUED | OUTPATIENT
Start: 2019-01-01 | End: 2019-01-01

## 2019-01-01 RX ORDER — SPIRONOLACTONE 25 MG/1
12.5 TABLET ORAL DAILY
Qty: 15 TABLET | Refills: 1 | Status: SHIPPED | OUTPATIENT
Start: 2019-01-01 | End: 2019-01-01

## 2019-01-01 RX ORDER — DIGOXIN 125 MCG
125 TABLET ORAL DAILY
Qty: 30 TABLET | Refills: 3 | Status: SHIPPED | OUTPATIENT
Start: 2019-01-01

## 2019-01-01 RX ORDER — AMOXICILLIN AND CLAVULANATE POTASSIUM 875; 125 MG/1; MG/1
1 TABLET, FILM COATED ORAL 2 TIMES DAILY
Qty: 14 TABLET | Refills: 0 | Status: SHIPPED | OUTPATIENT
Start: 2019-01-01 | End: 2019-01-01 | Stop reason: ALTCHOICE

## 2019-01-01 RX ORDER — PREDNISONE 20 MG/1
40 TABLET ORAL DAILY
Qty: 10 TABLET | Refills: 0 | Status: SHIPPED | OUTPATIENT
Start: 2019-01-01 | End: 2019-01-01 | Stop reason: ALTCHOICE

## 2019-01-01 RX ORDER — POLYETHYLENE GLYCOL 3350 17 G/17G
17 POWDER, FOR SOLUTION ORAL DAILY PRN
Status: DISCONTINUED | OUTPATIENT
Start: 2019-01-01 | End: 2019-01-01 | Stop reason: HOSPADM

## 2019-01-01 RX ORDER — HEPARIN SODIUM 200 [USP'U]/100ML
20 INJECTION, SOLUTION INTRAVENOUS CONTINUOUS
Status: DISCONTINUED | OUTPATIENT
Start: 2019-01-01 | End: 2019-01-01 | Stop reason: HOSPADM

## 2019-01-01 RX ORDER — BUPIVACAINE HYDROCHLORIDE AND EPINEPHRINE 2.5; 5 MG/ML; UG/ML
7.5 INJECTION, SOLUTION EPIDURAL; INFILTRATION; INTRACAUDAL; PERINEURAL ONCE
Status: COMPLETED | OUTPATIENT
Start: 2019-01-01 | End: 2019-01-01

## 2019-01-01 RX ORDER — DIPHENHYDRAMINE HCL 25 MG
25 TABLET ORAL EVERY 8 HOURS PRN
Status: DISCONTINUED | OUTPATIENT
Start: 2019-01-01 | End: 2019-01-01 | Stop reason: HOSPADM

## 2019-01-01 RX ADMIN — AMOXICILLIN AND CLAVULANATE POTASSIUM 1 TABLET: 500; 125 TABLET, FILM COATED ORAL at 05:37

## 2019-01-01 RX ADMIN — Medication 10 ML: at 20:51

## 2019-01-01 RX ADMIN — Medication 1 PUFF: at 23:27

## 2019-01-01 RX ADMIN — DILTIAZEM HYDROCHLORIDE 180 MG: 180 CAPSULE, COATED, EXTENDED RELEASE ORAL at 18:00

## 2019-01-01 RX ADMIN — DIGOXIN 125 MCG: 125 TABLET ORAL at 10:47

## 2019-01-01 RX ADMIN — DIGOXIN 125 MCG: 125 TABLET ORAL at 10:19

## 2019-01-01 RX ADMIN — Medication 10 ML: at 10:19

## 2019-01-01 RX ADMIN — LEVOTHYROXINE SODIUM 100 MCG: 100 TABLET ORAL at 07:02

## 2019-01-01 RX ADMIN — DIGOXIN 125 MCG: 125 TABLET ORAL at 09:52

## 2019-01-01 RX ADMIN — FUROSEMIDE 20 MG: 10 INJECTION, SOLUTION INTRAMUSCULAR; INTRAVENOUS at 10:47

## 2019-01-01 RX ADMIN — Medication 10 ML: at 07:48

## 2019-01-01 RX ADMIN — Medication 10 ML: at 21:53

## 2019-01-01 RX ADMIN — CEFEPIME HYDROCHLORIDE 2 G: 2 INJECTION, POWDER, FOR SOLUTION INTRAVENOUS at 13:58

## 2019-01-01 RX ADMIN — Medication 10 ML: at 14:47

## 2019-01-01 RX ADMIN — MEGESTROL ACETATE 400 MG: 40 SUSPENSION ORAL at 08:55

## 2019-01-01 RX ADMIN — ONDANSETRON 4 MG: 2 INJECTION INTRAMUSCULAR; INTRAVENOUS at 20:20

## 2019-01-01 RX ADMIN — AMOXICILLIN AND CLAVULANATE POTASSIUM 1 TABLET: 500; 125 TABLET, FILM COATED ORAL at 11:49

## 2019-01-01 RX ADMIN — CEFEPIME HYDROCHLORIDE 2 G: 2 INJECTION, POWDER, FOR SOLUTION INTRAVENOUS at 07:47

## 2019-01-01 RX ADMIN — CEFEPIME HYDROCHLORIDE 2 G: 2 INJECTION, POWDER, FOR SOLUTION INTRAVENOUS at 05:25

## 2019-01-01 RX ADMIN — CEFEPIME HYDROCHLORIDE 2 G: 2 INJECTION, POWDER, FOR SOLUTION INTRAVENOUS at 05:40

## 2019-01-01 RX ADMIN — HYDROCODONE BITARTRATE AND ACETAMINOPHEN 1 TABLET: 5; 325 TABLET ORAL at 23:25

## 2019-01-01 RX ADMIN — HYDROCORTISONE: 1 CREAM TOPICAL at 10:20

## 2019-01-01 RX ADMIN — FUROSEMIDE 20 MG: 10 INJECTION, SOLUTION INTRAMUSCULAR; INTRAVENOUS at 09:52

## 2019-01-01 RX ADMIN — VITAMIN D, TAB 1000IU (100/BT) 1000 UNITS: 25 TAB at 10:22

## 2019-01-01 RX ADMIN — CEFEPIME HYDROCHLORIDE 2 G: 2 INJECTION, POWDER, FOR SOLUTION INTRAVENOUS at 14:36

## 2019-01-01 RX ADMIN — Medication 10 ML: at 20:54

## 2019-01-01 RX ADMIN — Medication 10 ML: at 13:03

## 2019-01-01 RX ADMIN — SODIUM CHLORIDE: 9 INJECTION, SOLUTION INTRAVENOUS at 06:20

## 2019-01-01 RX ADMIN — LEVOTHYROXINE SODIUM 100 MCG: 100 TABLET ORAL at 05:13

## 2019-01-01 RX ADMIN — MEGESTROL ACETATE 400 MG: 40 SUSPENSION ORAL at 10:19

## 2019-01-01 RX ADMIN — DIPHENHYDRAMINE HCL 25 MG: 25 TABLET ORAL at 18:55

## 2019-01-01 RX ADMIN — FUROSEMIDE 20 MG: 10 INJECTION, SOLUTION INTRAMUSCULAR; INTRAVENOUS at 19:06

## 2019-01-01 RX ADMIN — TBO-FILGRASTIM 300 MCG: 300 INJECTION, SOLUTION SUBCUTANEOUS at 17:54

## 2019-01-01 RX ADMIN — CEFEPIME HYDROCHLORIDE 2 G: 2 INJECTION, POWDER, FOR SOLUTION INTRAVENOUS at 21:16

## 2019-01-01 RX ADMIN — VITAMIN D, TAB 1000IU (100/BT) 1000 UNITS: 25 TAB at 11:05

## 2019-01-01 RX ADMIN — LEVOTHYROXINE SODIUM 100 MCG: 100 TABLET ORAL at 06:33

## 2019-01-01 RX ADMIN — Medication 10 ML: at 21:39

## 2019-01-01 RX ADMIN — TRAZODONE HYDROCHLORIDE 25 MG: 50 TABLET ORAL at 20:54

## 2019-01-01 RX ADMIN — TRAZODONE HYDROCHLORIDE 25 MG: 50 TABLET ORAL at 21:10

## 2019-01-01 RX ADMIN — SERTRALINE 100 MG: 50 TABLET, FILM COATED ORAL at 21:53

## 2019-01-01 RX ADMIN — Medication 10 ML: at 09:55

## 2019-01-01 RX ADMIN — TBO-FILGRASTIM 300 MCG: 300 INJECTION, SOLUTION SUBCUTANEOUS at 22:54

## 2019-01-01 RX ADMIN — LIDOCAINE HYDROCHLORIDE 10 ML: 10 INJECTION, SOLUTION EPIDURAL; INFILTRATION; INTRACAUDAL; PERINEURAL at 10:11

## 2019-01-01 RX ADMIN — BUPIVACAINE HYDROCHLORIDE AND EPINEPHRINE BITARTRATE 30 ML: 2.5; .005 INJECTION, SOLUTION EPIDURAL; INFILTRATION; INTRACAUDAL; PERINEURAL at 10:08

## 2019-01-01 RX ADMIN — CEFEPIME HYDROCHLORIDE 2 G: 2 INJECTION, POWDER, FOR SOLUTION INTRAVENOUS at 13:34

## 2019-01-01 RX ADMIN — Medication 10 ML: at 10:45

## 2019-01-01 RX ADMIN — SERTRALINE 100 MG: 50 TABLET, FILM COATED ORAL at 21:38

## 2019-01-01 RX ADMIN — SERTRALINE 100 MG: 50 TABLET, FILM COATED ORAL at 21:51

## 2019-01-01 RX ADMIN — CEFEPIME HYDROCHLORIDE 2 G: 2 INJECTION, POWDER, FOR SOLUTION INTRAVENOUS at 05:13

## 2019-01-01 RX ADMIN — SODIUM CHLORIDE: 9 INJECTION, SOLUTION INTRAVENOUS at 17:04

## 2019-01-01 RX ADMIN — ONDANSETRON 4 MG: 2 INJECTION INTRAMUSCULAR; INTRAVENOUS at 13:02

## 2019-01-01 RX ADMIN — CEFEPIME HYDROCHLORIDE 2 G: 2 INJECTION, POWDER, FOR SOLUTION INTRAVENOUS at 20:16

## 2019-01-01 RX ADMIN — FUROSEMIDE 20 MG: 10 INJECTION, SOLUTION INTRAMUSCULAR; INTRAVENOUS at 09:55

## 2019-01-01 RX ADMIN — LEVOTHYROXINE SODIUM 100 MCG: 100 TABLET ORAL at 05:51

## 2019-01-01 RX ADMIN — ACETAMINOPHEN 650 MG: 325 TABLET, FILM COATED ORAL at 22:33

## 2019-01-01 RX ADMIN — CEFEPIME HYDROCHLORIDE 2 G: 2 INJECTION, POWDER, FOR SOLUTION INTRAVENOUS at 21:00

## 2019-01-01 RX ADMIN — SERTRALINE 100 MG: 50 TABLET, FILM COATED ORAL at 21:10

## 2019-01-01 RX ADMIN — SENNOSIDES 8.6 MG: 8.6 TABLET, FILM COATED ORAL at 18:00

## 2019-01-01 RX ADMIN — MEGESTROL ACETATE 400 MG: 40 SUSPENSION ORAL at 16:14

## 2019-01-01 RX ADMIN — LEVOTHYROXINE SODIUM 100 MCG: 100 TABLET ORAL at 05:37

## 2019-01-01 RX ADMIN — LEVOTHYROXINE SODIUM 100 MCG: 100 TABLET ORAL at 05:14

## 2019-01-01 RX ADMIN — VITAMIN D, TAB 1000IU (100/BT) 1000 UNITS: 25 TAB at 10:47

## 2019-01-01 RX ADMIN — SODIUM CHLORIDE 250 ML: 9 INJECTION, SOLUTION INTRAVENOUS at 12:50

## 2019-01-01 RX ADMIN — FUROSEMIDE 20 MG: 10 INJECTION, SOLUTION INTRAMUSCULAR; INTRAVENOUS at 20:51

## 2019-01-01 RX ADMIN — Medication 10 ML: at 08:56

## 2019-01-01 RX ADMIN — AMOXICILLIN AND CLAVULANATE POTASSIUM 1 TABLET: 500; 125 TABLET, FILM COATED ORAL at 21:38

## 2019-01-01 RX ADMIN — SENNOSIDES 17.2 MG: 8.6 TABLET, FILM COATED ORAL at 21:38

## 2019-01-01 RX ADMIN — LIDOCAINE HYDROCHLORIDE,EPINEPHRINE BITARTRATE 3 ML: 20; .01 INJECTION, SOLUTION INFILTRATION; PERINEURAL at 10:14

## 2019-01-01 RX ADMIN — ALBUTEROL SULFATE 2.5 MG: 2.5 SOLUTION RESPIRATORY (INHALATION) at 14:34

## 2019-01-01 RX ADMIN — DIGOXIN 125 MCG: 125 TABLET ORAL at 11:05

## 2019-01-01 RX ADMIN — Medication 10 ML: at 10:48

## 2019-01-01 RX ADMIN — CEFEPIME HYDROCHLORIDE 2 G: 2 INJECTION, POWDER, FOR SOLUTION INTRAVENOUS at 14:45

## 2019-01-01 RX ADMIN — SPIRONOLACTONE 25 MG: 25 TABLET ORAL at 08:48

## 2019-01-01 RX ADMIN — HYDROCODONE BITARTRATE AND ACETAMINOPHEN 1 TABLET: 5; 325 TABLET ORAL at 11:57

## 2019-01-01 RX ADMIN — SODIUM CHLORIDE 250 ML: 9 INJECTION, SOLUTION INTRAVENOUS at 23:30

## 2019-01-01 RX ADMIN — DIGOXIN 125 MCG: 125 TABLET ORAL at 10:44

## 2019-01-01 RX ADMIN — SERTRALINE 100 MG: 50 TABLET, FILM COATED ORAL at 20:09

## 2019-01-01 RX ADMIN — LEVOTHYROXINE SODIUM 100 MCG: 100 TABLET ORAL at 06:18

## 2019-01-01 RX ADMIN — MEGESTROL ACETATE 400 MG: 40 SUSPENSION ORAL at 09:55

## 2019-01-01 RX ADMIN — FUROSEMIDE 20 MG: 20 TABLET ORAL at 10:19

## 2019-01-01 RX ADMIN — Medication 10 ML: at 20:16

## 2019-01-01 RX ADMIN — SODIUM CHLORIDE: 9 INJECTION, SOLUTION INTRAVENOUS at 20:16

## 2019-01-01 RX ADMIN — CEFEPIME HYDROCHLORIDE 2 G: 2 INJECTION, POWDER, FOR SOLUTION INTRAVENOUS at 12:50

## 2019-01-01 RX ADMIN — SENNOSIDES 17.2 MG: 8.6 TABLET, FILM COATED ORAL at 20:09

## 2019-01-01 RX ADMIN — DILTIAZEM HYDROCHLORIDE 180 MG: 180 CAPSULE, COATED, EXTENDED RELEASE ORAL at 08:48

## 2019-01-01 RX ADMIN — Medication: at 10:14

## 2019-01-01 RX ADMIN — POLYETHYLENE GLYCOL 3350 17 G: 17 POWDER, FOR SOLUTION ORAL at 08:45

## 2019-01-01 RX ADMIN — SERTRALINE 100 MG: 50 TABLET, FILM COATED ORAL at 20:15

## 2019-01-01 RX ADMIN — MEGESTROL ACETATE 400 MG: 40 SUSPENSION ORAL at 09:52

## 2019-01-01 RX ADMIN — CEFAZOLIN SODIUM 2 G: 2 INJECTION, SOLUTION INTRAVENOUS at 09:39

## 2019-01-01 RX ADMIN — TBO-FILGRASTIM 300 MCG: 300 INJECTION, SOLUTION SUBCUTANEOUS at 18:22

## 2019-01-01 RX ADMIN — AMOXICILLIN AND CLAVULANATE POTASSIUM 1 TABLET: 500; 125 TABLET, FILM COATED ORAL at 13:49

## 2019-01-01 RX ADMIN — CEFEPIME HYDROCHLORIDE 2 G: 2 INJECTION, POWDER, FOR SOLUTION INTRAVENOUS at 20:45

## 2019-01-01 RX ADMIN — SODIUM CHLORIDE: 9 INJECTION, SOLUTION INTRAVENOUS at 17:12

## 2019-01-01 RX ADMIN — Medication 10 ML: at 10:23

## 2019-01-01 RX ADMIN — TRAZODONE HYDROCHLORIDE 25 MG: 50 TABLET ORAL at 22:32

## 2019-01-01 RX ADMIN — DIGOXIN 125 MCG: 125 TABLET ORAL at 09:55

## 2019-01-01 RX ADMIN — Medication 10 ML: at 20:48

## 2019-01-01 RX ADMIN — Medication 10 ML: at 06:18

## 2019-01-01 RX ADMIN — VITAMIN D, TAB 1000IU (100/BT) 1000 UNITS: 25 TAB at 10:19

## 2019-01-01 RX ADMIN — POLYETHYLENE GLYCOL 3350 17 G: 17 POWDER, FOR SOLUTION ORAL at 20:50

## 2019-01-01 RX ADMIN — MEGESTROL ACETATE 400 MG: 40 SUSPENSION ORAL at 10:47

## 2019-01-01 RX ADMIN — ONDANSETRON 4 MG: 2 INJECTION INTRAMUSCULAR; INTRAVENOUS at 18:19

## 2019-01-01 RX ADMIN — VITAMIN D, TAB 1000IU (100/BT) 1000 UNITS: 25 TAB at 09:55

## 2019-01-01 RX ADMIN — CEFEPIME HYDROCHLORIDE 2 G: 2 INJECTION, POWDER, FOR SOLUTION INTRAVENOUS at 04:59

## 2019-01-01 RX ADMIN — SENNOSIDES 17.2 MG: 8.6 TABLET, FILM COATED ORAL at 21:10

## 2019-01-01 RX ADMIN — ONDANSETRON 4 MG: 2 INJECTION INTRAMUSCULAR; INTRAVENOUS at 21:47

## 2019-01-01 RX ADMIN — SERTRALINE 100 MG: 50 TABLET, FILM COATED ORAL at 20:45

## 2019-01-01 RX ADMIN — CEFEPIME HYDROCHLORIDE 2 G: 2 INJECTION, POWDER, FOR SOLUTION INTRAVENOUS at 05:14

## 2019-01-01 RX ADMIN — HEPARIN SODIUM 20 ML/HR: 200 INJECTION, SOLUTION INTRAVENOUS at 10:15

## 2019-01-01 RX ADMIN — Medication 10 ML: at 20:10

## 2019-01-01 RX ADMIN — TRAZODONE HYDROCHLORIDE 25 MG: 50 TABLET ORAL at 20:45

## 2019-01-01 RX ADMIN — MEGESTROL ACETATE 400 MG: 40 SUSPENSION ORAL at 11:05

## 2019-01-01 RX ADMIN — Medication 10 ML: at 20:45

## 2019-01-01 RX ADMIN — TBO-FILGRASTIM 300 MCG: 300 INJECTION, SOLUTION SUBCUTANEOUS at 18:18

## 2019-01-01 RX ADMIN — DIPHENHYDRAMINE HYDROCHLORIDE 25 MG: 50 INJECTION, SOLUTION INTRAMUSCULAR; INTRAVENOUS at 09:39

## 2019-01-01 RX ADMIN — TRAZODONE HYDROCHLORIDE 25 MG: 50 TABLET ORAL at 22:16

## 2019-01-01 RX ADMIN — Medication 10 ML: at 08:48

## 2019-01-01 RX ADMIN — CEFEPIME HYDROCHLORIDE 2 G: 2 INJECTION, POWDER, FOR SOLUTION INTRAVENOUS at 20:54

## 2019-01-01 RX ADMIN — DIGOXIN 125 MCG: 125 TABLET ORAL at 08:55

## 2019-01-01 RX ADMIN — SENNOSIDES 17.2 MG: 8.6 TABLET, FILM COATED ORAL at 20:15

## 2019-01-01 RX ADMIN — DIPHENHYDRAMINE HCL 25 MG: 25 TABLET ORAL at 11:48

## 2019-01-01 RX ADMIN — SERTRALINE 100 MG: 50 TABLET, FILM COATED ORAL at 20:48

## 2019-01-01 RX ADMIN — Medication 10 ML: at 21:10

## 2019-01-01 RX ADMIN — HYDROCORTISONE: 1 CREAM TOPICAL at 21:38

## 2019-01-01 RX ADMIN — VITAMIN D, TAB 1000IU (100/BT) 1000 UNITS: 25 TAB at 08:48

## 2019-01-01 RX ADMIN — CEFEPIME HYDROCHLORIDE 2 G: 2 INJECTION, POWDER, FOR SOLUTION INTRAVENOUS at 20:48

## 2019-01-01 RX ADMIN — Medication 10 ML: at 19:06

## 2019-01-01 RX ADMIN — CEFEPIME HYDROCHLORIDE 2 G: 2 INJECTION, POWDER, FOR SOLUTION INTRAVENOUS at 14:47

## 2019-01-01 RX ADMIN — VITAMIN D, TAB 1000IU (100/BT) 1000 UNITS: 25 TAB at 10:44

## 2019-01-01 RX ADMIN — TRAZODONE HYDROCHLORIDE 25 MG: 50 TABLET ORAL at 21:53

## 2019-01-01 RX ADMIN — LEVOTHYROXINE SODIUM 100 MCG: 100 TABLET ORAL at 05:25

## 2019-01-01 RX ADMIN — LACTULOSE 20 G: 20 SOLUTION ORAL at 12:50

## 2019-01-01 RX ADMIN — TBO-FILGRASTIM 300 MCG: 300 INJECTION, SOLUTION SUBCUTANEOUS at 18:09

## 2019-01-01 RX ADMIN — SENNOSIDES 17.2 MG: 8.6 TABLET, FILM COATED ORAL at 20:45

## 2019-01-01 RX ADMIN — CEFEPIME HYDROCHLORIDE 2 G: 2 INJECTION, POWDER, FOR SOLUTION INTRAVENOUS at 06:16

## 2019-01-01 RX ADMIN — Medication 10 ML: at 05:14

## 2019-01-01 RX ADMIN — TRAZODONE HYDROCHLORIDE 25 MG: 50 TABLET ORAL at 21:59

## 2019-01-01 RX ADMIN — SERTRALINE 100 MG: 50 TABLET, FILM COATED ORAL at 20:54

## 2019-01-01 RX ADMIN — VITAMIN D, TAB 1000IU (100/BT) 1000 UNITS: 25 TAB at 09:52

## 2019-01-01 RX ADMIN — SODIUM CHLORIDE 7 ML: 9 INJECTION, SOLUTION INTRAMUSCULAR; INTRAVENOUS; SUBCUTANEOUS at 10:13

## 2019-01-01 RX ADMIN — ONDANSETRON 4 MG: 2 INJECTION INTRAMUSCULAR; INTRAVENOUS at 20:51

## 2019-01-01 RX ADMIN — DIGOXIN 62.5 MCG: 125 TABLET ORAL at 08:47

## 2019-01-01 RX ADMIN — MEGESTROL ACETATE 400 MG: 40 SUSPENSION ORAL at 10:44

## 2019-01-01 RX ADMIN — FUROSEMIDE 20 MG: 10 INJECTION, SOLUTION INTRAMUSCULAR; INTRAVENOUS at 11:05

## 2019-01-01 RX ADMIN — FUROSEMIDE 20 MG: 10 INJECTION, SOLUTION INTRAMUSCULAR; INTRAVENOUS at 10:44

## 2019-01-01 RX ADMIN — DIGOXIN 125 MCG: 125 TABLET ORAL at 10:22

## 2019-01-01 RX ADMIN — VITAMIN D, TAB 1000IU (100/BT) 1000 UNITS: 25 TAB at 08:55

## 2019-01-01 RX ADMIN — CEFEPIME HYDROCHLORIDE 2 G: 2 INJECTION, POWDER, FOR SOLUTION INTRAVENOUS at 21:53

## 2019-01-01 RX ADMIN — POLYETHYLENE GLYCOL 3350 17 G: 17 POWDER, FOR SOLUTION ORAL at 10:36

## 2019-01-01 RX ADMIN — SODIUM BICARBONATE 0.5 MEQ: 0.2 INJECTION, SOLUTION INTRAVENOUS at 10:11

## 2019-01-01 RX ADMIN — SODIUM CHLORIDE: 9 INJECTION, SOLUTION INTRAVENOUS at 04:59

## 2019-01-01 RX ADMIN — Medication 10 ML: at 11:06

## 2019-01-01 ASSESSMENT — PAIN SCALES - GENERAL
PAINLEVEL_OUTOF10: 2
PAINLEVEL_OUTOF10: 0
PAINLEVEL_OUTOF10: 0
PAINLEVEL_OUTOF10: 3
PAINLEVEL_OUTOF10: 0
PAINLEVEL_OUTOF10: 8
PAINLEVEL_OUTOF10: 0
PAINLEVEL_OUTOF10: 5
PAINLEVEL_OUTOF10: 8
PAINLEVEL_OUTOF10: 0
PAINLEVEL_OUTOF10: 2
PAINLEVEL_OUTOF10: 4
PAINLEVEL_OUTOF10: 0
PAINLEVEL_OUTOF10: 2
PAINLEVEL_OUTOF10: 0
PAINLEVEL_OUTOF10: 6
PAINLEVEL_OUTOF10: 0
PAINLEVEL_OUTOF10: 2
PAINLEVEL_OUTOF10: 0
PAINLEVEL_OUTOF10: 0
PAINLEVEL_OUTOF10: 7
PAINLEVEL_OUTOF10: 0

## 2019-01-01 ASSESSMENT — PATIENT HEALTH QUESTIONNAIRE - PHQ9
3. TROUBLE FALLING OR STAYING ASLEEP: 1
SUM OF ALL RESPONSES TO PHQ QUESTIONS 1-9: 12
8. MOVING OR SPEAKING SO SLOWLY THAT OTHER PEOPLE COULD HAVE NOTICED. OR THE OPPOSITE, BEING SO FIGETY OR RESTLESS THAT YOU HAVE BEEN MOVING AROUND A LOT MORE THAN USUAL: 0
SUM OF ALL RESPONSES TO PHQ9 QUESTIONS 1 & 2: 1
6. FEELING BAD ABOUT YOURSELF - OR THAT YOU ARE A FAILURE OR HAVE LET YOURSELF OR YOUR FAMILY DOWN: 0
4. FEELING TIRED OR HAVING LITTLE ENERGY: 3
10. IF YOU CHECKED OFF ANY PROBLEMS, HOW DIFFICULT HAVE THESE PROBLEMS MADE IT FOR YOU TO DO YOUR WORK, TAKE CARE OF THINGS AT HOME, OR GET ALONG WITH OTHER PEOPLE: 1
SUM OF ALL RESPONSES TO PHQ QUESTIONS 1-9: 3
8. MOVING OR SPEAKING SO SLOWLY THAT OTHER PEOPLE COULD HAVE NOTICED. OR THE OPPOSITE, BEING SO FIGETY OR RESTLESS THAT YOU HAVE BEEN MOVING AROUND A LOT MORE THAN USUAL: 2
10. IF YOU CHECKED OFF ANY PROBLEMS, HOW DIFFICULT HAVE THESE PROBLEMS MADE IT FOR YOU TO DO YOUR WORK, TAKE CARE OF THINGS AT HOME, OR GET ALONG WITH OTHER PEOPLE: 1
4. FEELING TIRED OR HAVING LITTLE ENERGY: 3
2. FEELING DOWN, DEPRESSED OR HOPELESS: 1
7. TROUBLE CONCENTRATING ON THINGS, SUCH AS READING THE NEWSPAPER OR WATCHING TELEVISION: 0
SUM OF ALL RESPONSES TO PHQ9 QUESTIONS 1 & 2: 3
1. LITTLE INTEREST OR PLEASURE IN DOING THINGS: 1
1. LITTLE INTEREST OR PLEASURE IN DOING THINGS: 3
8. MOVING OR SPEAKING SO SLOWLY THAT OTHER PEOPLE COULD HAVE NOTICED. OR THE OPPOSITE, BEING SO FIGETY OR RESTLESS THAT YOU HAVE BEEN MOVING AROUND A LOT MORE THAN USUAL: 0
SUM OF ALL RESPONSES TO PHQ QUESTIONS 1-9: 7
9. THOUGHTS THAT YOU WOULD BE BETTER OFF DEAD, OR OF HURTING YOURSELF: 0
3. TROUBLE FALLING OR STAYING ASLEEP: 0
10. IF YOU CHECKED OFF ANY PROBLEMS, HOW DIFFICULT HAVE THESE PROBLEMS MADE IT FOR YOU TO DO YOUR WORK, TAKE CARE OF THINGS AT HOME, OR GET ALONG WITH OTHER PEOPLE: 0
SUM OF ALL RESPONSES TO PHQ9 QUESTIONS 1 & 2: 1
5. POOR APPETITE OR OVEREATING: 1
SUM OF ALL RESPONSES TO PHQ QUESTIONS 1-9: 7
9. THOUGHTS THAT YOU WOULD BE BETTER OFF DEAD, OR OF HURTING YOURSELF: 0
6. FEELING BAD ABOUT YOURSELF - OR THAT YOU ARE A FAILURE OR HAVE LET YOURSELF OR YOUR FAMILY DOWN: 0
4. FEELING TIRED OR HAVING LITTLE ENERGY: 1
3. TROUBLE FALLING OR STAYING ASLEEP: 0
SUM OF ALL RESPONSES TO PHQ QUESTIONS 1-9: 3
SUM OF ALL RESPONSES TO PHQ QUESTIONS 1-9: 12
2. FEELING DOWN, DEPRESSED OR HOPELESS: 0
6. FEELING BAD ABOUT YOURSELF - OR THAT YOU ARE A FAILURE OR HAVE LET YOURSELF OR YOUR FAMILY DOWN: 0
SUM OF ALL RESPONSES TO PHQ9 QUESTIONS 1 & 2: 3
SUM OF ALL RESPONSES TO PHQ QUESTIONS 1-9: 3
5. POOR APPETITE OR OVEREATING: 1
1. LITTLE INTEREST OR PLEASURE IN DOING THINGS: 0
5. POOR APPETITE OR OVEREATING: 3
2. FEELING DOWN, DEPRESSED OR HOPELESS: 0
1. LITTLE INTEREST OR PLEASURE IN DOING THINGS: 2
2. FEELING DOWN, DEPRESSED OR HOPELESS: 1
7. TROUBLE CONCENTRATING ON THINGS, SUCH AS READING THE NEWSPAPER OR WATCHING TELEVISION: 0
SUM OF ALL RESPONSES TO PHQ QUESTIONS 1-9: 3
7. TROUBLE CONCENTRATING ON THINGS, SUCH AS READING THE NEWSPAPER OR WATCHING TELEVISION: 2
9. THOUGHTS THAT YOU WOULD BE BETTER OFF DEAD, OR OF HURTING YOURSELF: 0

## 2019-01-01 ASSESSMENT — ENCOUNTER SYMPTOMS
BACK PAIN: 0
BACK PAIN: 0
SINUS PAIN: 0
SORE THROAT: 0
NAUSEA: 0
VOMITING: 0
ABDOMINAL PAIN: 0
DIARRHEA: 0
ABDOMINAL PAIN: 0
CONSTIPATION: 0
DIARRHEA: 0
BLOOD IN STOOL: 0
VOMITING: 0
ABDOMINAL PAIN: 0
VOMITING: 0
SINUS PAIN: 0
SHORTNESS OF BREATH: 0
NAUSEA: 0
SHORTNESS OF BREATH: 0
PHOTOPHOBIA: 0
COUGH: 1
EYE ITCHING: 0
RHINORRHEA: 1
SHORTNESS OF BREATH: 1
SHORTNESS OF BREATH: 0
SORE THROAT: 0
WHEEZING: 0
EYE DISCHARGE: 0
NAUSEA: 0
CONSTIPATION: 0
SORE THROAT: 0
COUGH: 1
BACK PAIN: 0
SINUS PRESSURE: 0
BLOOD IN STOOL: 0
EYE ITCHING: 0
SINUS PAIN: 0
DIARRHEA: 0
CONSTIPATION: 0
SINUS PRESSURE: 0
SORE THROAT: 0
VOMITING: 0
RHINORRHEA: 0
SINUS PRESSURE: 0
EYE DISCHARGE: 0
WHEEZING: 1
EYE PAIN: 0
EYE PAIN: 0
TROUBLE SWALLOWING: 0
ABDOMINAL PAIN: 0
SHORTNESS OF BREATH: 0
DIARRHEA: 0
SORE THROAT: 0
ABDOMINAL PAIN: 0
CONSTIPATION: 0
NAUSEA: 0
CONSTIPATION: 0
SINUS PAIN: 0
SINUS PAIN: 0
COUGH: 1
SHORTNESS OF BREATH: 1
EYE REDNESS: 0
WHEEZING: 1
WHEEZING: 0
WHEEZING: 0
ABDOMINAL PAIN: 0
NAUSEA: 0
CONSTIPATION: 0
EYE REDNESS: 0
DIARRHEA: 0
TROUBLE SWALLOWING: 0
PHOTOPHOBIA: 0
COUGH: 0
WHEEZING: 0
DIARRHEA: 0
BACK PAIN: 0
VOMITING: 0
VOMITING: 0
NAUSEA: 0
RHINORRHEA: 1
BLOOD IN STOOL: 0
BLOOD IN STOOL: 0
COUGH: 0
COUGH: 0

## 2019-01-01 ASSESSMENT — PAIN DESCRIPTION - LOCATION
LOCATION: BACK
LOCATION: CHEST;RIB CAGE
LOCATION: BACK

## 2019-01-01 ASSESSMENT — PAIN DESCRIPTION - PAIN TYPE
TYPE: CHRONIC PAIN
TYPE: ACUTE PAIN
TYPE: CHRONIC PAIN

## 2019-01-07 PROBLEM — F32.1 CURRENT MODERATE EPISODE OF MAJOR DEPRESSIVE DISORDER WITHOUT PRIOR EPISODE (HCC): Status: ACTIVE | Noted: 2019-01-01

## 2019-04-01 NOTE — TELEPHONE ENCOUNTER
I sent in a refill for her. Please call and inform the pt. If she has a worsening/lingering cough, she needs to be seen and evaluated to ensure no infection or pneumonia. Thanks!

## 2019-04-02 NOTE — LETTER
[address] [facility] [phone]  [address] [facility],[address] [phone]    Pacemaker/Defibrillator Clinic          04/02/19        [address] [address]        Dear Suzen Mcburney    This letter is to inform you that we received the transmission from your monitor at home that checks your pacemaker and/or defibrillator, or implanted heart monitor. The next date your monitor will automatically transmit will be 5/7/19. Please do not send additional routine transmissions unless specifically requested. Your device and monitor are wireless and most transmit cellularly, but please periodically check your monitor is still plugged in to the electrical outlet. If you still use the telephone land line to send please ensure the connection to the phone chavez is secure. This will help to ensure successful automatic transmissions in the future. Also, the monitor needs to be close to you while sleeping at night. Please be aware that the remote device transmission sites are periodically monitored only during regular business hours during which simultaneous in-office device clinics are being run. If your transmission requires attention, we will contact you as soon as possible. Thank you.             [address]

## 2019-04-02 NOTE — TELEPHONE ENCOUNTER
I called [facility] [phone] and spoke with Cameron Douglas and inquired about the CBC and BNP as we only received the BMP. She saw noted that all three were drawn. They are going to check with the lab to get other results. Will call if for some reason unable to obtain results. Will fax once received. I will call patient once all labs reviewed.

## 2019-04-02 NOTE — TELEPHONE ENCOUNTER
The only results I have received is the BMP. Notify patient kidney numbers and potassium are stable. Please check with home health in regards to CBC and BNP results.

## 2019-04-02 NOTE — TELEPHONE ENCOUNTER
Reviewed additional labs. Notify patient kidney numbers are stable. Her blood count shows leukopenia, or low WBC count. From previous lab result notes (12/19/2018), she has a history of this. Please fax labs to PCP office. Continue current meds.

## 2019-04-02 NOTE — PROGRESS NOTES
Carelink/loop recorder shows normal function. No new arrhythmias noted. See PACEART report under cardiology tab.

## 2019-04-03 NOTE — TELEPHONE ENCOUNTER
LVM with results of stable kidney and potassium levels, WBC low as in previous CBC. Instructed to continue current medications and call if any questions. Labs have not been scanned into chart yet; will either fax hard copies to PCP or fax once scanned via Epic. Labs will be sent to her PCP.

## 2019-04-04 NOTE — PATIENT INSTRUCTIONS
Increase fluid water intake  Call me tomorrow if symptoms worsening      Patient Education   prednisone  Pronunciation:  PRED lynda cabrera  Brand:  Kingsley, Sterapred, Sterapred 12 DAY, Sterapred DS, Sterapred DS 12 DAY  What is the most important information I should know about prednisone? Prednisone treats many different conditions such as allergic disorders, skin conditions, ulcerative colitis, arthritis, lupus, psoriasis, or breathing disorders. You should not take prednisone if you have a fungal infection anywhere in your body. Steroid medication can weaken your immune system, making it easier for you to get an infection. Avoid being near people who are sick or have infections. Do not receive a \"live\" vaccine while using prednisone. Call your doctor at once if you have shortness of breath, severe pain in your upper stomach, bloody or tarry stools, severe depression, changes in personality or behavior, vision problems, or eye pain. You should not stop using prednisone suddenly. Follow your doctor's instructions about tapering your dose. What is prednisone? Prednisone is a steroid. Prednisone prevents the release of substances in the body that cause inflammation. Prednisone also suppresses the immune system. Prednisone is used as an anti-inflammatory or an immunosuppressant medication. Prednisone treats many different conditions such as allergic disorders, skin conditions, ulcerative colitis, arthritis, lupus, psoriasis, or breathing disorders. Prednisone may also be used for purposes not listed in this medication guide. What should I discuss with my healthcare provider before taking prednisone? You should not use this medication if you are allergic to prednisone, or if you have a fungal infection anywhere in your body. Steroid medication can weaken your immune system, making it easier for you to get an infection or worsening an infection you already have or have recently had.  Tell your doctor about any illness or infection you have had within the past several weeks. To make sure prednisone is safe for you, tell your doctor if you have:  · any illness that causes diarrhea;  · liver disease (such as cirrhosis);  · kidney disease;  · heart disease, high blood pressure, low levels of potassium in your blood;  · a thyroid disorder;  · diabetes;  · a history of malaria;  · tuberculosis;  · osteoporosis;  · glaucoma, cataracts, or herpes infection of the eyes;  · stomach ulcers, ulcerative colitis, or a history of stomach bleeding;  · a muscle disorder such as myasthenia gravis; or  · depression or mental illness. Long-term use of steroids may lead to bone loss (osteoporosis), especially if you smoke, if you do not exercise, if you do not get enough vitamin D or calcium in your diet, or if you have a family history of osteoporosis. Talk with your doctor about your risk of osteoporosis. Prednisone can cause low birth weight or birth defects if you take the medicine during your first trimester. Tell your doctor if you are pregnant or plan to become pregnant while using this medication. Use effective birth control. Prednisone can pass into breast milk and may harm a nursing baby. Tell your doctor if you are breast-feeding a baby. Steroids can affect growth in children. Talk with your doctor if you think your child is not growing at a normal rate while using this medication. How should I take prednisone? Follow all directions on your prescription label. Your doctor may occasionally change your dose to make sure you get the best results. Do not take this medicine in larger or smaller amounts or for longer than recommended. Take with food. Your dosage needs may change if you have any unusual stress such as a serious illness, fever or infection, or if you have surgery or a medical emergency. Do not change your medication dose or schedule without your doctor's advice.   Measure liquid medicine with a special dose-measuring spoon or medicine cup. If you do not have a dose-measuring device, ask your pharmacist for one. Do not crush, chew, or break a delayed-release tablet. Swallow it whole. While using prednisone, you may need frequent blood tests at your doctor's office. Your blood pressure may also need to be checked. This medication can cause unusual results with certain medical tests. Tell any doctor who treats you that you are using prednisone. You should not stop using prednisone suddenly. Follow your doctor's instructions about tapering your dose. Wear a medical alert tag or carry an ID card stating that you take prednisone. Any medical care provider who treats you should know that you are using a steroid. Store at room temperature away from moisture and heat. What happens if I miss a dose? Take the missed dose as soon as you remember. Skip the missed dose if it is almost time for your next scheduled dose. Do not take extra medicine to make up the missed dose. What happens if I overdose? Seek emergency medical attention or call the Poison Help line at 1-611.870.7397. An overdose of prednisone is not expected to produce life threatening symptoms. However, long term use of high steroid doses can lead to symptoms such as thinning skin, easy bruising, changes in the shape or location of body fat (especially in your face, neck, back, and waist), increased acne or facial hair, menstrual problems, impotence, or loss of interest in sex. What should I avoid while taking prednisone? Avoid being near people who are sick or have infections. Call your doctor for preventive treatment if you are exposed to chicken pox or measles. These conditions can be serious or even fatal in people who are using a steroid. Do not receive a \"live\" vaccine while using prednisone. Prednisone may increase your risk of harmful effects from a live vaccine.  Live vaccines include measles, mumps, rubella (MMR), rotavirus, typhoid, yellow fever, varicella (chickenpox), zoster (shingles), and nasal flu (influenza) vaccine. Avoid drinking alcohol while you are taking prednisone. What are the possible side effects of prednisone? Get emergency medical help if you have any of these signs of an allergic reaction: hives; difficult breathing; swelling of your face, lips, tongue, or throat. Call your doctor at once if you have:  · blurred vision, eye pain, or seeing halos around lights;  · swelling, rapid weight gain, feeling short of breath;  · severe depression, feelings of extreme happiness or sadness, changes in personality or behavior, seizure (convulsions);  · bloody or tarry stools, coughing up blood;  · pancreatitis (severe pain in your upper stomach spreading to your back, nausea and vomiting, fast heart rate);  · low potassium (confusion, uneven heart rate, extreme thirst, increased urination, leg discomfort, muscle weakness or limp feeling); or  · dangerously high blood pressure (severe headache, blurred vision, buzzing in your ears, anxiety, confusion, chest pain, shortness of breath, uneven heartbeats, seizure). Other common side effects may include:  · sleep problems (insomnia), mood changes;  · increased appetite, gradual weight gain;  · acne, increased sweating, dry skin, thinning skin, bruising or discoloration;  · slow wound healing;  · headache, dizziness, spinning sensation;  · nausea, stomach pain, bloating; or  · changes in the shape or location of body fat (especially in your arms, legs, face, neck, breasts, and waist). This is not a complete list of side effects and others may occur. Call your doctor for medical advice about side effects. You may report side effects to FDA at 3-535-FDA-7883. What other drugs will affect prednisone? Many drugs can interact with prednisone. Not all possible interactions are listed here.  Tell your doctor about all your medications and any you start or stop using during treatment with prednisone, especially:  · amphotericin B;  · cyclosporine;  · digoxin, digitalis;  · Paraje's wort;  · an antibiotic such as clarithromycin or telithromycin;  · antifungal medication such as itraconazole, ketoconazole, posaconazole, voriconazole;  · birth control pills and other hormones;  · a blood thinner such as warfarin, Coumadin;  · a diuretic or \"water pill\";  · the hepatitis C medications boceprevir or telaprevir;  · HIV or AIDS medicine such as atazanavir, delavirdine, efavirenz, fosamprenavir, indinavir, nelfinavir, nevirapine, ritonavir, saquinavir;  · insulin or diabetes medications you take by mouth;  · a non-steroidal anti-inflammatory drug (NSAID) such as aspirin, ibuprofen (Advil, Motrin), naproxen (Aleve), celecoxib, diclofenac, indomethacin, meloxicam, and others;  · seizure medications such as carbamazepine, fosphenytoin, oxcarbazepine, phenobarbital, phenytoin, primidone; or  · the tuberculosis medications isoniazid, rifabutin, rifapentine, or rifampin. This list is not complete and many other drugs can interact with prednisone. This includes prescription and over-the-counter medicines, vitamins, and herbal products. Give a list of all your medicines to any healthcare provider who treats you. Where can I get more information? Your pharmacist can provide more information about prednisone. Remember, keep this and all other medicines out of the reach of children, never share your medicines with others, and use this medication only for the indication prescribed. Every effort has been made to ensure that the information provided by [address] is accurate, up-to-date, and complete, but no guarantee is made to that effect. Drug information contained herein may be time sensitive.  [facility] information has been compiled for use by healthcare practitioners and consumers in the [place] and therefore [facility] does not warrant that uses outside of the [place] are appropriate, unless

## 2019-04-04 NOTE — PROGRESS NOTES
Acute Office Visit  4/4/2019    SUBJECTIVE:    Patient ID: Veronica Lebron is a 81 y.o. female. Chief Complaint   Patient presents with    Chest Congestion     Pt c/o non prod cough, wheezing, afebrile - cold started on saturday. Taking tessalon and motrin, nyquil      HPI: The patient presents to the office for an acute visit. Pt reports that she is having a productive cough with yellow sputum, nasal congestion, WARD, wheezing and feeling ill. Weakness was present today. Symptoms started on Saturday and are worsening. Her daughter reports that the pt was dehydrated today. As of 3 weeks ago, aldactone was started and lasix was d/c'd per cardiology. Cardiology jimbo a CBC, BMP and BNP on Friday. Denies fevers or chills. Denies N/V, diarrhea or abdominal pain. Denies CP or SOB. Treatment tried and response - benzonatate, motrin and nyquil  Last antibiotic- February  Recent ill contacts?  No  Tobacco use or second hand smoke exposure - No  Condition better, worsening, or stable - Worse    No Known Allergies    Current Outpatient Medications   Medication Sig Dispense Refill    predniSONE (DELTASONE) 20 MG tablet Take 2 tablets by mouth daily for 5 days 10 tablet 0    benzonatate (TESSALON) 100 MG capsule Take 1 capsule by mouth 3 times daily as needed for Cough 30 capsule 0    spironolactone (ALDACTONE) 25 MG tablet Take 0.5 tablets by mouth daily 15 tablet 0    digoxin (LANOXIN) 125 MCG tablet Take 0.5 tablets by mouth daily Take 1/2 tab daily 15 tablet 1    levothyroxine (SYNTHROID) 100 MCG tablet Take 1 tablet by mouth Daily MUST BE BRAND NAME 90 tablet 0    sertraline (ZOLOFT) 100 MG tablet Take 1 tablet by mouth daily 90 tablet 0    Cholecalciferol (VITAMIN D3) 1000 units TABS Take 1 tablet by mouth daily 90 tablet 1    albuterol sulfate  (90 Base) MCG/ACT inhaler Inhale 1 puff into the lungs every 6 hours as needed for Wheezing or Shortness of Breath 1 Inhaler 1    rivaroxaban (XARELTO) 20 MG TABS tablet Take 1 tablet by mouth daily 30 tablet 3    Spacer/Aero Chamber Mouthpiece MISC 1 each by Does not apply route once as needed (inhaler use) 1 each 0    magnesium hydroxide (MILK OF MAGNESIA) 400 MG/5ML suspension Take by mouth daily as needed for Constipation      acetaminophen (TYLENOL) 325 MG tablet Take 650 mg by mouth every 6 hours as needed for Pain      atorvastatin (LIPITOR) 20 MG tablet Take 1 tablet by mouth daily 90 tablet 3    diltiazem (CARDIZEM CD) 180 MG extended release capsule Take 1 capsule by mouth daily 30 capsule 11    Coenzyme Q10 (CO Q 10 PO) Take 1 tablet by mouth daily Indications: Omega Q Plus       Calcium-Phosphorus-Vitamin D (CITRACAL +D3 PO) Take by mouth       No current facility-administered medications for this visit. Review of Systems   Constitutional: Negative for appetite change, chills, diaphoresis, fatigue, fever and unexpected weight change. HENT: Positive for rhinorrhea. Negative for congestion, drooling, ear discharge, ear pain, hearing loss, postnasal drip, sinus pressure, sinus pain, sneezing, sore throat and trouble swallowing. Eyes: Negative for photophobia, pain, discharge, redness, itching and visual disturbance. Respiratory: Positive for cough, shortness of breath (with exertion- WARD) and wheezing. Cardiovascular: Negative for chest pain and palpitations. Gastrointestinal: Negative for abdominal pain, constipation, diarrhea, nausea and vomiting. Skin: Negative for pallor. Neurological: Positive for weakness. Negative for dizziness, light-headedness and headaches. OBJECTIVE:  /74 (Site: Left Upper Arm, Position: Sitting, Cuff Size: Large Adult)   Pulse 84   Temp 97.2 °F (36.2 °C) (Oral)   Ht 5' 8.5\" (1.74 m)   Wt 150 lb (68 kg)   SpO2 97%   BMI 22.48 kg/m²    Physical Exam   Constitutional: She is oriented to person, place, and time. She appears well-developed and well-nourished. No distress.    HENT:   Head: Normocephalic and atraumatic. Right Ear: Hearing and external ear normal.   Left Ear: Hearing and external ear normal.   Nose: Nose normal.   Mouth/Throat: Mucous membranes are normal.   Dry mouth   Eyes: Pupils are equal, round, and reactive to light. Conjunctivae and EOM are normal. Right eye exhibits no discharge. Left eye exhibits no discharge. No scleral icterus. Neck: Normal range of motion. Neck supple. No thyromegaly present. Cardiovascular: Normal rate, regular rhythm and normal heart sounds. No murmur heard. Pulmonary/Chest: Effort normal. No stridor. No respiratory distress. She has wheezes. Cough present  Crackles in bases bilaterally   Abdominal: Soft. Bowel sounds are normal. There is no tenderness. Musculoskeletal: Normal range of motion. Lymphadenopathy:     She has cervical adenopathy. Neurological: She is alert and oriented to person, place, and time. Skin: Skin is warm and dry. She is not diaphoretic. Psychiatric: She has a normal mood and affect. Vitals reviewed. ASSESSMENT/PLAN:  Delilah Pinedo was seen today for chest congestion. Diagnoses and all orders for this visit:    Cough/Wheezing/Weakness  -    Cardiology titrating aldactone/lasix for pleural effusion. I do not see the lab results from 03/29/19, but pt states that they were called and informed all levels are stable, but low WBC. Continue with cardiology. Pt declines labs as she had them on Friday. - Lungs with wheezes throughout and crackles in the bases. Pt with CXR and ATB 2 months ago. Afebrile. O2 97% on room air. Reviewed ATB resistance and exposure to radiation with scans.  - Pt agreeable to albuterol nebulizer today and starting prednisone. She will call tomorrow if symptoms are worsening or not improving or if she develops a fever. Will evaluate with CXR at that time. - Symptomatic management reviewed. I encouraged an increase in fluid water intake. - predniSONE (DELTASONE) 20 MG tablet;  Take 2 tablets by mouth daily for 5 days - patient education handout provided and reviewed with the pt.  -     albuterol (PROVENTIL) nebulizer solution 2.5 mg- side effects reviewed. Lungs with less wheezes post albuterol nebulizer. - Red flag findings reviewed with pt and she will call if these occur. Return for as previously scheduled or sooner if needed. Pt informed to call if symptoms worsen or fail to improve. All questions answered. Patient states no further questions or concerns at this time.     Electronically signed by ANABELL Carcamo CNP 04/04/19

## 2019-04-04 NOTE — TELEPHONE ENCOUNTER
Labs faxed to Dr. Rodriguez Pages office for her review per [facility] request.  Fax conf# [phone]

## 2019-04-17 NOTE — PROGRESS NOTES
edema  Neurological: No focal deficits  Psychological: Normal mood and affect    Home Medications:  Current Outpatient Medications   Medication Sig Dispense Refill    spironolactone (ALDACTONE) 25 MG tablet Take 0.5 tablets by mouth daily 15 tablet 1    digoxin (LANOXIN) 125 MCG tablet Take 0.5 tablets by mouth daily Take 1/2 tab daily 15 tablet 1    levothyroxine (SYNTHROID) 100 MCG tablet Take 1 tablet by mouth Daily MUST BE BRAND NAME 90 tablet 0    sertraline (ZOLOFT) 100 MG tablet Take 1 tablet by mouth daily 90 tablet 0    Cholecalciferol (VITAMIN D3) 1000 units TABS Take 1 tablet by mouth daily 90 tablet 1    albuterol sulfate  (90 Base) MCG/ACT inhaler Inhale 1 puff into the lungs every 6 hours as needed for Wheezing or Shortness of Breath 1 Inhaler 1    rivaroxaban (XARELTO) 20 MG TABS tablet Take 1 tablet by mouth daily 30 tablet 3    magnesium hydroxide (MILK OF MAGNESIA) 400 MG/5ML suspension Take by mouth daily as needed for Constipation      acetaminophen (TYLENOL) 325 MG tablet Take 650 mg by mouth every 6 hours as needed for Pain      diltiazem (CARDIZEM CD) 180 MG extended release capsule Take 1 capsule by mouth daily 30 capsule 11    Coenzyme Q10 (CO Q 10 PO) Take 1 tablet by mouth daily Indications: Omega Q Plus       Calcium-Phosphorus-Vitamin D (CITRACAL +D3 PO) Take by mouth      Spacer/Aero Chamber Mouthpiece MISC 1 each by Does not apply route once as needed (inhaler use) 1 each 0    atorvastatin (LIPITOR) 20 MG tablet Take 1 tablet by mouth daily 90 tablet 3     No current facility-administered medications for this visit.         Labs:   Lab Results   Component Value Date    WBC 3.8 (L) 12/19/2018    HGB 13.6 12/19/2018    HCT 39.8 12/19/2018    .5 (H) 12/19/2018     12/19/2018     Lab Results   Component Value Date     12/31/2018    K 4.0 12/31/2018    K 4.2 10/29/2018     12/31/2018    CO2 29 12/31/2018    BUN 15 12/31/2018    CREATININE 0.72 Plan:  1. Increase aldactone (spironolactone) to 25 mg daily. 2. Recheck labs (CBC/BMP/BNP/digoxin) in 2 weeks. 3. Hold digoxin morning of lab test.  4. Other meds unchanged. 5. Increase activity. 6. Continue no added salt. 7. Follow up in 4 months, earlier for worsening. Thank you for allowing me to participate in the care of your patient. ANABELL Thompson-CNP        QUALITY MEASURES  1. Tobacco Cessation Counseling: N/A  2. BP retake if >140/90: N/A  3. Communication to PCP: Office note forwarded/faxed to PCP  4. CAD antiplatelet therapy: N/A  5. CAD lipid lowering therapy: N/A  6. HF A.  Fib on anticoagulation: yes

## 2019-05-07 NOTE — TELEPHONE ENCOUNTER
Reviewed labs dated 5/1. Notify daughter labs show stable kidney numbers, potassium and BNP for fluid. Her WBC though continues to trend down, now 2.1 and platelet count 90 although it may be higher as there is clumping of platelets in the sample. Does she follow with hematologist? If not, she should follow up with her PCP. Please fax copy of results to PCP.

## 2019-05-07 NOTE — TELEPHONE ENCOUNTER
Patient and daughter, Julio Jorge, notified of lab results. She stated patient sees Dr. Moses Montgomery, Oncologist.  [facility] stated that would be okay to FU with her or PCP on WBC - will fax labs to both offices today for their review.

## 2019-05-08 NOTE — TELEPHONE ENCOUNTER
Transmission received today shows patients device has reached replacement time. Please schedule an appointment with EP within 6-8 weeks.

## 2019-05-08 NOTE — TELEPHONE ENCOUNTER
This is not emergent. Pt just has a loop recorder. Battery still has some left for next few months.  Next available is fine to discuss removal.

## 2019-05-08 NOTE — TELEPHONE ENCOUNTER
CAROLINE seen patient in March 2016 and needs a new pt appt since it has been over 3 yrs. Per message below pt needs appt in 6-8 weeks. Where can we add pt. Please advise. Goldie Mejia           5/8/19 9:27 AM   Note      Transmission received today shows patients device has reached replacement time. Please schedule an appointment with EP within 6-8 weeks.

## 2019-06-07 NOTE — PROGRESS NOTES
Pt arrived to same day from radiology post port placement, site c/d/i. Pt a&o x4, VSS. Daughter at bedside, updated on POC. Will continue to monitor.

## 2019-06-07 NOTE — BRIEF OP NOTE
Brief Operative Note      Patient: Merlin Soliz MRN: [id number]     YOB: 1935  Age: 81 y.o. Sex: female        DATE OF PROCEDURE: 6/7/2019     PROCEDURE PERFORMED: Port placement    SURGEON: Asa Carlos MD    ANESTHESIA: Benadryl and copious local only    Estimated Blood Loss:none    Complications: None. Device implanted: 8 Fr slim port left axillary to mid right atrium.            Specimen: none    Electronically signed by Asa Carlos MD on 6/7/2019 at 10:17 AM

## 2019-06-07 NOTE — PRE SEDATION
tablet Take 1 tablet by mouth daily 3/7/19  Yes ANABELL Pa CNP   Cholecalciferol (VITAMIN D3) 1000 units TABS Take 1 tablet by mouth daily 2/12/19  Yes ANABELL Roland CNP   rivaroxaban (XARELTO) 20 MG TABS tablet Take 1 tablet by mouth daily 12/27/18  Yes ANABELL Roland CNP   magnesium hydroxide (MILK OF MAGNESIA) 400 MG/5ML suspension Take by mouth daily as needed for Constipation   Yes Historical Provider, MD   diltiazem (CARDIZEM CD) 180 MG extended release capsule Take 1 capsule by mouth daily 6/5/18  Yes Kong Garcia,    Coenzyme Q10 (CO Q 10 PO) Take 1 tablet by mouth daily Indications: Omega Q Plus    Yes Historical Provider, MD   Calcium-Phosphorus-Vitamin D (CITRACAL +D3 PO) Take by mouth   Yes Historical Provider, MD   albuterol sulfate  (90 Base) MCG/ACT inhaler Inhale 1 puff into the lungs every 6 hours as needed for Wheezing or Shortness of Breath 2/7/19   ANABELL Pa CNP   Spacer/Aero Chamber Mouthpiece MISC 1 each by Does not apply route once as needed (inhaler use) 12/19/18 4/4/19  ANABELL Pa CNP   acetaminophen (TYLENOL) 325 MG tablet Take 650 mg by mouth every 6 hours as needed for Pain    Historical Provider, MD     Coumadin Use Last 7 Days:  no  Antiplatelet drug therapy use last 7 days: Robyn Rubins held  Other anticoagulant use last 7 days: no  Additional Medication Information:  na      Pre-Sedation Documentation and Exam:   I have reviewed the patient's history and review of systems. Mallampati Airway Assessment:  Mallampati Class I - (soft palate, fauces, uvula & anterior/posterior tonsillar pillars are visible)    Prior History of Anesthesia Complications:   none    ASA Classification:  Class 2 - A normal healthy patient with mild systemic disease    Sedation/ Anesthesia Plan:   intravenous sedation    Medications Planned:   midazolam (Versed) intravenously and fentanyl intravenously.  Likely only benadryl due to prior reaction.     Patient is an appropriate candidate for plan of sedation: yes    Electronically signed by Asa Carlos MD on 6/7/2019 at 8:55 AM

## 2019-06-11 NOTE — TELEPHONE ENCOUNTER
Pt daughter calling for new script of Diltiazem 180 mg to [facility] on [address] [facility]. [facility].

## 2019-06-11 NOTE — PROGRESS NOTES
Take 1 capsule by mouth daily 6/5/18  Yes Nel Isabel,    Coenzyme Q10 (CO Q 10 PO) Take 1 tablet by mouth daily Indications: Omega Q Plus    Yes Historical Provider, MD   Calcium-Phosphorus-Vitamin D (CITRACAL +D3 PO) Take by mouth   Yes Historical Provider, MD   Spacer/Aero Chamber Mouthpiece [facility] 1 each by Does not apply route once as needed (inhaler use) 12/19/18 4/4/19  ANABELL Meredith - CNP       Allergies:  Patient has no known allergies. Review of Systems:    [x]Full ROS obtained and negative except as mentioned in HPI    Physical Examination:    /62 (Site: Right Upper Arm, Position: Sitting, Cuff Size: Medium Adult)   Pulse 80   Ht 5' 8\" (1.727 m)   Wt 144 lb (65.3 kg)   Breastfeeding?  No   BMI 21.90 kg/m²   Wt Readings from Last 3 Encounters:   06/14/19 144 lb (65.3 kg)   06/07/19 142 lb (64.4 kg)   06/05/19 140 lb (63.5 kg)       Vitals:    06/14/19 1317   BP: 110/62   Pulse: 80       · GENERAL: Well developed, well nourished, no acute distress  · NEUROLOGICAL: Alert and oriented x3  · PSYCH: Normal mood and affect   · SKIN: Warm and dry  · HEENT: Normocephalic, atraumatic, Sclera non-icteric, mucous membranes moist  · NECK: supple, JVP normal  · CARDIAC: Normal PMI, regular rate and rhythm, normal S1S2, no murmur, rub, or gallop  · RESPIRATORY: Normal respiratory effort, clear to auscultation bilaterally  · EXTREMITIES: no edema or clubbing, +2 pulses bilaterally   · MUSCULOSKELETAL: No joint swelling or tenderness, no chest wall tenderness  · GASTROINTESTINAL: normal bowel sounds, soft, non-tender        Labs:  Lab Review   Hospital Outpatient Visit on 06/07/2019   Component Date Value    Protime 06/07/2019 14.3*    INR 06/07/2019 1.25*    aPTT 06/07/2019 35.8     Sodium 06/07/2019 135*    Potassium 06/07/2019 4.4     Chloride 06/07/2019 97*    CO2 06/07/2019 27     Anion Gap 06/07/2019 11     Glucose 06/07/2019 109*    BUN 06/07/2019 14     CREATININE 06/07/2019 0.8     GFR Non- 06/07/2019 >60     GFR  06/07/2019 >60     Calcium 06/07/2019 9.5     WBC 06/07/2019 1.3*    RBC 06/07/2019 3.34*    Hemoglobin 06/07/2019 11.3*    Hematocrit 06/07/2019 33.2*    MCV 06/07/2019 99.4     MCH 06/07/2019 33.8     MCHC 06/07/2019 34.0     RDW 06/07/2019 17.0*    Platelets 80/22/5587 69*    MPV 06/07/2019 10.8*    PLATELET SLIDE REVIEW 06/07/2019 Decreased     SLIDE REVIEW 06/07/2019 see below     Path Consult 06/07/2019 Yes     Neutrophils % 06/07/2019 30.9     Lymphocytes % 06/07/2019 52.5     Monocytes % 06/07/2019 12.1     Eosinophils % 06/07/2019 1.6     Basophils % 06/07/2019 2.9     Neutrophils # 06/07/2019 0.4*    Lymphocytes # 06/07/2019 0.7*    Monocytes # 06/07/2019 0.2     Eosinophils # 06/07/2019 0.0     Basophils # 06/07/2019 0.0     Anisocytosis 06/07/2019 1+*    Ovalocytes 06/07/2019 Occasional*    Path Consult 06/07/2019 Reviewed          Imaging:  I have reviewed the below testing personally:    EKG:    10/31/18  Atrial fibrillation  Low voltage QRS  Nonspecific T wave abnormality  Borderline QT interval     ECHO:   3/2016  Summary   -Normal left ventricle size, wall thickness and systolic function with an   estimated ejection fraction of 55%.  -No regional wall motion abnormalities are seen.   -E/e'= 10.4 .   -Aortic valve appears mildly sclerotic but opens adequately   -There is mild mitral,pulmonic and tricuspid regurgitation with RVSP   estimated at 37 mmHg. 11/15/18 TTE    Technically difficult examination.   Normal left ventricle size and wall thickness. Probably mildly abnormal   systolic function with an estimated ejection fraction of 50-55%. No obvious   regional wall motion abnormalities are seen.  Diastolic filling parameters   suggests normal diastolic function.   Moderate mitral regurgitation is present.   Mild tricuspid regurgitation.   Estimated pulmonary artery systolic pressure hesitate to call. Melissa Christina D.O., [facility]  Interventional Cardiology     o: [phone]  [address], [address]    NOTE:  This report was transcribed using voice recognition software. Every effort was made to ensure accuracy; however, inadvertent computerized transcription errors may be present. Scribe's Attestation: This note was scribed in the presence of Dr. Noni Beaver DO by Gauri Vanegas RN. The above documentation has been prepared under my direction and personally reviewed by me in its entirety. I confirm that the note above accurately reflects all work, treatment, procedures, and medical decision making performed by me. An electronic signature was used to authenticate this note.

## 2019-06-14 NOTE — LETTER
[facility]  [address] [address] [address] [address]  Phone: [phone]  Fax: [facility] [place]        2019     Tisha Tracy [facility] [address]  [address] [place] [id number]    Patient: Jameson Herron  MR Number: [id number]  YOB: 1935  Date of Visit: 2019    Dear Dr. Andrey Jerome:                                       2019    PATIENT: Jameson Herron  : 1935    Primary Care Provider:   Tisha Tracy [facility]  [phone]      Reason for evaluation:   Chief Complaint   Patient presents with    Hyperlipidemia    Fatigue       History of present illness:   Ms. Jameson Herron is a 81 y.o. female patient here today for routine six-month follow-up for atrial fibrillation, hypertension, and CHF. Nelly's daughter is with her and says that since her last visit she was diagnosed with myelodysplastic syndrome after further workup for fatigue. Her appetite has been down and she is down 20 pounds. She says that she is working to drink 1-2 Ensures a day and has 24-hour care at her home that was already in place for her . She had a left-sided port placed in her chest and just started Vidaza chemotherapy under the care of Dr. Vashti Marsh. Her next treatment is in 4 weeks. She continues to follow with the congestive heart failure clinic but has not tolerated an ACE inhibitor or ARB due to hypotension. Hypertension remains controlled as does rate for AFib. Initial history:  She is a retired nurse with a psychology degree and has 2 daughters who are both registered nurses and one of them has taken over managing [facility] medications. Her  is at home with 24-hour care.       Medical History:      Diagnosis Date    Arthritis     fingers; osteoarthritis    Atrial fibrillation ([facility]     Cancer ([facility]     breast cancer  Carotid artery stenosis     Chronic kidney disease     active UTI    Depression     Fx ankle 2/4/15    LT    History of blood transfusion     after chemo 2002    Hyperlipidemia     Hypertension     Hypothyroidism     Movement disorder     Palpitation     Pleural effusion     Psychiatric problem     post of confusion    Shingles     Syncope and collapse     Thyroid disease     thyroid removed 1970 radiation prior       Surgical History:      Procedure Laterality Date    ANKLE SURGERY  2/4/15    ORIF LEFT ANKLE    BREAST SURGERY      lumpectomy & quadrectomy; w/lymphnode removal. Transplanted own back muscle to chest    COLONOSCOPY      ENDOSCOPY, COLON, DIAGNOSTIC      EYE SURGERY      bilat cataract removal    FRACTURE SURGERY  2010, 2015     left shoulder humerus fx (titanium) , ORIF left ankle    HYSTERECTOMY      OTHER SURGICAL HISTORY      radiation of the thyroid    OTHER SURGICAL HISTORY  3/22/16     Loop recorder implanted    SHOULDER SURGERY         Social History:  Social History     Socioeconomic History    Marital status:      Spouse name: Not on file    Number of children: Not on file    Years of education: Not on file    Highest education level: Not on file   Occupational History    Not on file   Social Needs    Financial resource strain: Not on file    Food insecurity:     Worry: Not on file     Inability: Not on file    Transportation needs:     Medical: Not on file     Non-medical: Not on file   Tobacco Use    Smoking status: Never Smoker    Smokeless tobacco: Never Used   Substance and Sexual Activity    Alcohol use: Yes     Comment: no desire since last fall; usually 1 glass wine daily    Drug use: No    Sexual activity: Never   Lifestyle    Physical activity:     Days per week: Not on file     Minutes per session: Not on file    Stress: Not on file   Relationships    Social connections:     Talks on phone: Not on file Gets together: Not on file     Attends Synagogue service: Not on file     Active member of club or organization: Not on file     Attends meetings of clubs or organizations: Not on file     Relationship status: Not on file    Intimate partner violence:     Fear of current or ex partner: Not on file     Emotionally abused: Not on file     Physically abused: Not on file     Forced sexual activity: Not on file   Other Topics Concern    Not on file   Social History Narrative    For fun, she enjoys drawing, art, cat and gardening. She enjoys spending time with her family as well as reading. Family in town. She was an psych NP. She lives in [place]. 4 children. 6 grandchildren. Family History:  No evidence for sudden cardiac death or premature CAD. Problem Relation Age of Onset    Heart Disease Mother     Cancer Mother     Heart Disease Father     High Blood Pressure Neg Hx     High Cholesterol Neg Hx        Medications:  [x] Medications and dosages reviewed. Prior to Admission medications    Medication Sig Start Date End Date Taking?  Authorizing Provider   lidocaine (LIDODERM) 5 %  6/12/19  Yes Historical Provider, MD   digoxin (LANOXIN) 125 MCG tablet Take 0.5 tablets by mouth daily Take 1/2 tab daily 6/14/19  Yes Eris Easley DO   spironolactone (ALDACTONE) 25 MG tablet Take 1 tablet by mouth daily 4/17/19  Yes ANABELL Jeter CNP   levothyroxine (SYNTHROID) 100 MCG tablet Take 1 tablet by mouth Daily MUST BE BRAND NAME 3/8/19  Yes ANABELL Guerra CNP   sertraline (ZOLOFT) 100 MG tablet Take 1 tablet by mouth daily 3/7/19  Yes ANABELL Guerra CNP   Cholecalciferol (VITAMIN D3) 1000 units TABS Take 1 tablet by mouth daily 2/12/19  Yes ANABELL Jeter CNP   albuterol sulfate  (90 Base) MCG/ACT inhaler Inhale 1 puff into the lungs every 6 hours as needed for Wheezing or Shortness of Breath 2/7/19 Yes ANABELL Sparks CNP   rivaroxaban (XARELTO) 20 MG TABS tablet Take 1 tablet by mouth daily 12/27/18  Yes ANABELL Be CNP   magnesium hydroxide (MILK OF MAGNESIA) 400 MG/5ML suspension Take by mouth daily as needed for Constipation   Yes Historical Provider, MD   acetaminophen (TYLENOL) 325 MG tablet Take 650 mg by mouth every 6 hours as needed for Pain   Yes Historical Provider, MD   diltiazem (CARDIZEM CD) 180 MG extended release capsule Take 1 capsule by mouth daily 6/5/18  Yes Cherre Ormond, DO   Coenzyme Q10 (CO Q 10 PO) Take 1 tablet by mouth daily Indications: Omega Q Plus    Yes Historical Provider, MD   Calcium-Phosphorus-Vitamin D (CITRACAL +D3 PO) Take by mouth   Yes Historical Provider, MD   Spacer/Aero Chamber Mouthpiece [facility] 1 each by Does not apply route once as needed (inhaler use) 12/19/18 4/4/19  ANABELL Sparks CNP       Allergies:  Patient has no known allergies. Review of Systems:    [x]Full ROS obtained and negative except as mentioned in HPI    Physical Examination:    /62 (Site: Right Upper Arm, Position: Sitting, Cuff Size: Medium Adult)   Pulse 80   Ht 5' 8\" (1.727 m)   Wt 144 lb (65.3 kg)   Breastfeeding?  No   BMI 21.90 kg/m²    Wt Readings from Last 3 Encounters:   06/14/19 144 lb (65.3 kg)   06/07/19 142 lb (64.4 kg)   06/05/19 140 lb (63.5 kg)       Vitals:    06/14/19 1317   BP: 110/62   Pulse: 80       · GENERAL: Well developed, well nourished, no acute distress  · NEUROLOGICAL: Alert and oriented x3  · PSYCH: Normal mood and affect   · SKIN: Warm and dry  · HEENT: Normocephalic, atraumatic, Sclera non-icteric, mucous membranes moist  · NECK: supple, JVP normal  · CARDIAC: Normal PMI, regular rate and rhythm, normal S1S2, no murmur, rub, or gallop  · RESPIRATORY: Normal respiratory effort, clear to auscultation bilaterally  · EXTREMITIES: no edema or clubbing, +2 pulses bilaterally · MUSCULOSKELETAL: No joint swelling or tenderness, no chest wall tenderness  · GASTROINTESTINAL: normal bowel sounds, soft, non-tender        Labs:  Lab Review   Hospital Outpatient Visit on 06/07/2019   Component Date Value    Protime 06/07/2019 14.3*    INR 06/07/2019 1.25*    aPTT 06/07/2019 35.8     Sodium 06/07/2019 135*    Potassium 06/07/2019 4.4     Chloride 06/07/2019 97*    CO2 06/07/2019 27     Anion Gap 06/07/2019 11     Glucose 06/07/2019 109*    BUN 06/07/2019 14     CREATININE 06/07/2019 0.8     GFR Non- 06/07/2019 >60     GFR  06/07/2019 >60     Calcium 06/07/2019 9.5     WBC 06/07/2019 1.3*    RBC 06/07/2019 3.34*    Hemoglobin 06/07/2019 11.3*    Hematocrit 06/07/2019 33.2*    MCV 06/07/2019 99.4     MCH 06/07/2019 33.8     MCHC 06/07/2019 34.0     RDW 06/07/2019 17.0*    Platelets 25/09/4131 69*    MPV 06/07/2019 10.8*    PLATELET SLIDE REVIEW 06/07/2019 Decreased     SLIDE REVIEW 06/07/2019 see below     Path Consult 06/07/2019 Yes     Neutrophils % 06/07/2019 30.9     Lymphocytes % 06/07/2019 52.5     Monocytes % 06/07/2019 12.1     Eosinophils % 06/07/2019 1.6     Basophils % 06/07/2019 2.9     Neutrophils # 06/07/2019 0.4*    Lymphocytes # 06/07/2019 0.7*    Monocytes # 06/07/2019 0.2     Eosinophils # 06/07/2019 0.0     Basophils # 06/07/2019 0.0     Anisocytosis 06/07/2019 1+*    Ovalocytes 06/07/2019 Occasional*    Path Consult 06/07/2019 Reviewed          Imaging:  I have reviewed the below testing personally:    EKG:    10/31/18  Atrial fibrillation  Low voltage QRS  Nonspecific T wave abnormality  Borderline QT interval     ECHO:   3/2016  Summary   -Normal left ventricle size, wall thickness and systolic function with an   estimated ejection fraction of 55%.    -No regional wall motion abnormalities are seen.   -E/e'= 10.4 .   -Aortic valve appears mildly sclerotic but opens adequately  -There is mild mitral,pulmonic and tricuspid regurgitation with RVSP   estimated at 37 mmHg. 11/15/18 TTE    Technically difficult examination.   Normal left ventricle size and wall thickness. Probably mildly abnormal   systolic function with an estimated ejection fraction of 50-55%. No obvious   regional wall motion abnormalities are seen. Diastolic filling parameters   suggests normal diastolic function.   Moderate mitral regurgitation is present.   Mild tricuspid regurgitation.   Estimated pulmonary artery systolic pressure is at 30 mmHg assuming a right   atrial pressure of 3 mmHg. STRESS TEST:   1/2013  Conclusions        Summary    Normal LV size and systolic function    Left ventricular ejection fraction of 74 %.    There is normal isotope uptake at stress and rest. There is no evidence of    myocardial ischemia or scar.    Normal Lexiscan stress test.       3/2016  Impressions   Right Impression   The right internal carotid artery appears to have a 16-49% diameter reducing   stenosis based on velocity criteria. The right vertebral artery demonstrates normal antegrade flow. The right subclavian artery is visualized and demonstrates turbulent flow. The right brachial pressure was not obtained due to IV placement . Left Impression   The left internal carotid artery appears to have a 1-15% diameter reducing   stenosis based on velocity criteria. The left vertebral artery demonstrates normal antegrade flow. The left subclavian artery is visualized and demonstrates multiphasic flow. The device is [email or web address] Implant date: 3/22/2016  The device was programmed to detect:   VT: 380 ms 16 beats   Bradycardia: 40 bpm.     Impression/Recommendations    Ms. Hailey Gonzalez is a 81 y.o. 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 Breast Cancer   MDS       The following cardiovascular specific medications were confirmed at this visit:     Rate controlled : Diltiazem 180 mg/Digoxin 62.5 mcg   [id number]= 4 : Xarelto 20 mg   Spironolactone 25 mg - appreciate CHF Clinic follow up     EP follow up to discuss removal of ILR Re: battery life        Return in about 1 year (around 6/14/2020). Patient Instructions   No medication changes  Follow up in 1 year    Thank you for allowing me to participate in the care of your patient. Please do not hesitate to call. Cory Prakash D.O., [facility]  Interventional Cardiology     o: [phone]  [address], [address]    NOTE:  This report was transcribed using voice recognition software. Every effort was made to ensure accuracy; however, inadvertent computerized transcription errors may be present. Scribe's Attestation: This note was scribed in the presence of Dr. Monica Stone DO by MACY Rowe. The above documentation has been prepared under my direction and personally reviewed by me in its entirety. I confirm that the note above accurately reflects all work, treatment, procedures, and medical decision making performed by me. An electronic signature was used to authenticate this note.        Sincerely,        Benny Rivas DO

## 2019-06-19 NOTE — PROGRESS NOTES
Office Visit   6/19/2019    Subjective:  Chief Complaint   Patient presents with    3 Month Follow-Up    Hypothyroidism    Mood Swings     improved minus energy level     Ear Fullness     feeling full      HPI:  Asa Suarez is a 81 y.o. female who presents to the clinic today for follow up. Hypothyroidism- Last TSH stable on current dose without side effects. Asymptomatic. Mood- Taking Zoloft 100 mg PO daily without side effects. No SI/HI. No longer following with psychology. Seeing cardiology who recommended to continue current regimen and keep 1 year f/u. Asymptomatic. No chest pain, palpitations, shortness of breath, trouble breathing, lightheadedness, dizziness or blurred vision. Pt reports ear fullness and decreased hearing for the last few weeks. She reports a history of earwax. No fevers or chills. No nasal congestion. No cough. Diagnosed with myelodysplastic syndrome after further workup for fatigue. Being followed by hematology and receiving infusions- started 10 days ago. Had port placement. She is fatigued, likely r/t this treatment/MDS. She reports that since infusions, the pt is feeling better. Appetite and fluid intake is low d/t treatment as well. Next treatment in July for 5 days every 4 weeks. Review of Systems   Constitutional: Positive for fatigue. Negative for appetite change, chills, diaphoresis, fever and unexpected weight change. HENT: Positive for hearing loss. Negative for congestion, drooling, ear discharge, ear pain, postnasal drip, rhinorrhea, sinus pressure, sinus pain, sneezing, sore throat and trouble swallowing. Ear fullness   Eyes: Negative for photophobia, pain, discharge, redness, itching and visual disturbance. Respiratory: Negative for cough, shortness of breath and wheezing. Cardiovascular: Negative for chest pain, palpitations and leg swelling.    Gastrointestinal: Negative for abdominal pain, constipation, diarrhea, nausea and vomiting. Skin: Negative for pallor and rash. Neurological: Negative for dizziness, weakness, light-headedness, numbness and headaches.      No Known Allergies    Current Outpatient Rx   Medication Sig Dispense Refill    levothyroxine (SYNTHROID) 100 MCG tablet Take 1 tablet by mouth Daily MUST BE BRAND NAME 90 tablet 0    sertraline (ZOLOFT) 100 MG tablet Take 1 tablet by mouth daily 90 tablet 0    lidocaine (LIDODERM) 5 %       digoxin (LANOXIN) 125 MCG tablet Take 0.5 tablets by mouth daily Take 1/2 tab daily 15 tablet 1    spironolactone (ALDACTONE) 25 MG tablet Take 1 tablet by mouth daily 30 tablet 2    Cholecalciferol (VITAMIN D3) 1000 units TABS Take 1 tablet by mouth daily 90 tablet 1    albuterol sulfate  (90 Base) MCG/ACT inhaler Inhale 1 puff into the lungs every 6 hours as needed for Wheezing or Shortness of Breath 1 Inhaler 1    rivaroxaban (XARELTO) 20 MG TABS tablet Take 1 tablet by mouth daily 30 tablet 3    magnesium hydroxide (MILK OF MAGNESIA) 400 MG/5ML suspension Take by mouth daily as needed for Constipation      acetaminophen (TYLENOL) 325 MG tablet Take 650 mg by mouth every 6 hours as needed for Pain      diltiazem (CARDIZEM CD) 180 MG extended release capsule Take 1 capsule by mouth daily 30 capsule 11    Coenzyme Q10 (CO Q 10 PO) Take 1 tablet by mouth daily Indications: Omega Q Plus       Calcium-Phosphorus-Vitamin D (CITRACAL +D3 PO) Take by mouth      Spacer/Aero Chamber Mouthpiece MISC 1 each by Does not apply route once as needed (inhaler use) 1 each 0     Patient Active Problem List   Diagnosis    Hypertension    Hyperlipidemia    Hypothyroid    Breast CA (HCC)    Fatigue    Palpitations    Trimalleolar fracture    Atrial fibrillation (HCC)    Carotid artery stenosis    Encounter for monitoring aromatase inhibitor therapy    Syncope    Syncope and collapse    Encounter for electronic analysis of reveal event recorder    Osteoporosis    Medication monitoring encounter    Compression fracture of L1 lumbar vertebra (HCC)    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 Physical Exam   Constitutional: She is oriented to person, place, and time. She appears well-developed and well-nourished. No distress. HENT:   Head: Normocephalic and atraumatic. Right Ear: Hearing normal.   Left Ear: Hearing normal.   Nose: Nose normal.   Mouth/Throat: Oropharynx is clear and moist and mucous membranes are normal. No oropharyngeal exudate. Bilateral cerumen impaction   Eyes: Pupils are equal, round, and reactive to light. Conjunctivae and EOM are normal. Right eye exhibits no discharge. Left eye exhibits no discharge. No scleral icterus. Neck: Normal range of motion. Neck supple. No thyromegaly present. Cardiovascular: Normal rate and normal heart sounds. Irregular heart rhythm   Pulmonary/Chest: Effort normal and breath sounds normal. No stridor. No respiratory distress. She has no wheezes. She has no rales. She exhibits no tenderness. Abdominal: Soft. Bowel sounds are normal. There is no tenderness. Musculoskeletal: Normal range of motion. Lymphadenopathy:     She has no cervical adenopathy. Neurological: She is alert and oriented to person, place, and time. Coordination normal.   Skin: Skin is warm and dry. No rash noted. She is not diaphoretic. No erythema. No pallor.    Psychiatric: She has a normal mood and affect. Her behavior is normal. Judgment and thought content normal.   Vitals reviewed. Assessment and Plan:  Chelsea Galo was seen today for 3 month follow-up, hypothyroidism, mood swings and ear fullness. Diagnoses and all orders for this visit:    Other specified hypothyroidism  -    Last TSH stable on Synthroid 100 mg by mouth daily. She would like to continue this current regimen. No side effects on medication.  - Will reevaluate TSH in 3 months  - levothyroxine (SYNTHROID) 100 MCG tablet; Take 1 tablet by mouth Daily MUST BE BRAND NAME    Current episode of major depressive disorder without prior episode, unspecified depression episode severity/Positive depression screening  -    Reports baseline mood on Zoloft 100 mg by mouth daily. No SI/HI.  - No longer following with psychology. The patient knows that she can call to schedule if she wishes to do so. - sertraline (ZOLOFT) 100 MG tablet; Take 1 tablet by mouth daily- refill  -     Positive Screen for Clinical Depression with a Documented Follow-up Plan     Bilateral impacted cerumen   - A curette was utilized and significant cerumen was removed   - The patient reports that after the cerumen was removed she was able to hear much better. - Lifestyle modifications for preventing cerumen impaction were provided and reviewed with the patient. MDS   - Likely cause of fatigue. Continue current treatment with oncology. A. fib   - Asymptomatic. No chest pain, palpitations, shortness of breath, trouble breathing, lightheadedness, dizziness or blurred vision.   - Continue with cardiology    Return in about 3 months (around 9/19/2019) for TSH/mood f/u, or sooner if needed. Pt will call if symptoms worsen or fail to improve. All questions answered. Pt states no further questions or concerns at this time.    Electronically signed by: John Nicholas 06/19/19    On the basis of positive PHQ-9 screening (PHQ-9 Total Score: 7), the following plan was implemented: continue current medication regimen. Patient will follow-up in 3 month(s) with PCP.

## 2019-06-19 NOTE — PATIENT INSTRUCTIONS
Patient Education        Earwax Blockage: Care Instructions  Your Care Instructions    Earwax is a natural substance that protects the ear canal. Normally, earwax drains from the ears and does not cause problems. Sometimes earwax builds up and hardens. Earwax blockage (also called cerumen impaction) can cause some loss of hearing and pain. When wax is tightly packed, you will need to have your doctor remove it. Follow-up care is a key part of your treatment and safety. Be sure to make and go to all appointments, and call your doctor if you are having problems. It's also a good idea to know your test results and keep a list of the medicines you take. How can you care for yourself at home? · Do not try to remove earwax with cotton swabs, fingers, or other objects. This can make the blockage worse and damage the eardrum. · If your doctor recommends that you try to remove earwax at home:  ? Soften and loosen the earwax with warm mineral oil. You also can try hydrogen peroxide mixed with an equal amount of room temperature water. Place 2 drops of the fluid, warmed to body temperature, in the ear two times a day for up to 5 days. ? Once the wax is loose and soft, all that is usually needed to remove it from the ear canal is a gentle, warm shower. Direct the water into the ear, then tip your head to let the earwax drain out. Dry your ear thoroughly with a hair dryer set on low. Hold the dryer several inches from your ear. ? If the warm mineral oil and shower do not work, use an over-the-counter wax softener. Read and follow all instructions on the label. After using the wax softener, use an ear syringe to gently flush the ear. Make sure the flushing solution is body temperature. Cool or hot fluids in the ear can cause dizziness. When should you call for help?   Call your doctor now or seek immediate medical care if:    · Pus or blood drains from your ear.     · Your ears are ringing or feel full.     · You have a loss of hearing.    Watch closely for changes in your health, and be sure to contact your doctor if:    · You have pain or reduced hearing after 1 week of home treatment.     · You have any new symptoms, such as nausea or balance problems. Where can you learn more? Go to [email or web address] and sign in to your [employer] account. Enter O107 in the [employer] box to learn more about \"Earwax Blockage: Care Instructions. \"     If you do not have an account, please click on the \"Sign Up Now\" link. Current as of: September 23, 2018  Content Version: 12.0  © 7606-0704 Healthwise, Incorporated. Care instructions adapted under license by [facility] [place] ([facility]). If you have questions about a medical condition or this instruction, always ask your healthcare professional. [address] any warranty or liability for your use of this information.

## 2019-06-27 NOTE — TELEPHONE ENCOUNTER
Medication Refill    Last appointment with cardiology?06/14/19   Next appointment with Cardiology?  There is none Critical access hospital for [facility]    Medication needing refilled:diltiazem (CARDIZEM CD) 180 MG extended release capsule    Doseage of the medication: 180 mg    How are you taking this medication (QD, BID, TID, QID, PRN):1 cap daily    Patient want a 30 or 90 day supply called in: 80    Which Pharmacy are we sending the medication to: [facility] [address], [address] [phone] - F [phone]

## 2019-07-02 PROBLEM — D70.9 NEUTROPENIA (HCC): Status: ACTIVE | Noted: 2019-01-01

## 2019-07-02 NOTE — H&P
Oncology Hematology Care   HISTORY AND PHYSICAL NOTE      7/2/2019 7:27 PM    Patient Information:  Donell Jones is a 81 y.o. female [id number]  PCP:  Tracey Hashimoto, APRN - CNP (Tel: [phone] )    Primary Oncologist: Mark Thomas MD    Chief complaint: Weakness      History of Present Illness:  Davis Terrell is a 81 y.o. female patient of Mark Thomas MD who is treated for MDS. She has received 1 cycle of Vidaza 6/10 - 6/15. She came to the office today for follow up. She is weak and fatigued. She fell at home. She has a large bruise on her left arm from the fall. She reports shortness of breath. She has pancytopenia with profound neutropenia. She denies fevers and chills. She is hypotensive. She received a fluid bolus in the office. She has a history of atrial fibrillation. She is on multiple antihypertensive medications. She is also on Xarelto. REVIEW OF SYSTEMS:     Per HPI; otherwise 10 point ROS is negative     Past Medical History:   has a past medical history of Arthritis, Atrial fibrillation ([address] Cancer ([address] Carotid artery stenosis, Chronic kidney disease, Depression, Fx ankle, History of blood transfusion, Hyperlipidemia, Hypertension, Hypothyroidism, Movement disorder, Palpitation, Pleural effusion, Psychiatric problem, Shingles, Syncope and collapse, and Thyroid disease. Past Surgical History:   has a past surgical history that includes Hysterectomy; shoulder surgery; other surgical history; Ankle surgery (2/4/15); Colonoscopy; Endoscopy, colon, diagnostic; eye surgery; Breast surgery; fracture surgery (2010, 2015 ); and other surgical history (3/22/16 ). Medications:  No current facility-administered medications on file prior to encounter.       Current Outpatient Medications on File Prior to Encounter   Medication Sig Dispense Refill    diltiazem (CARDIZEM CD) 180 MG extended release capsule Take 1 capsule by mouth daily 90 capsule 3    levothyroxine (SYNTHROID) 100 MCG tablet Take 1 tablet by mouth Daily MUST BE BRAND NAME 90 tablet 0    sertraline (ZOLOFT) 100 MG tablet Take 1 tablet by mouth daily 90 tablet 0    lidocaine (LIDODERM) 5 %       digoxin (LANOXIN) 125 MCG tablet Take 0.5 tablets by mouth daily Take 1/2 tab daily 15 tablet 1    spironolactone (ALDACTONE) 25 MG tablet Take 1 tablet by mouth daily 30 tablet 2    Cholecalciferol (VITAMIN D3) 1000 units TABS Take 1 tablet by mouth daily 90 tablet 1    rivaroxaban (XARELTO) 20 MG TABS tablet Take 1 tablet by mouth daily 30 tablet 3    acetaminophen (TYLENOL) 325 MG tablet Take 650 mg by mouth every 6 hours as needed for Pain      Calcium-Phosphorus-Vitamin D (CITRACAL +D3 PO) Take by mouth      albuterol sulfate  (90 Base) MCG/ACT inhaler Inhale 1 puff into the lungs every 6 hours as needed for Wheezing or Shortness of Breath 1 Inhaler 1    Spacer/Aero Chamber Mouthpiece MISC 1 each by Does not apply route once as needed (inhaler use) 1 each 0    magnesium hydroxide (MILK OF MAGNESIA) 400 MG/5ML suspension Take by mouth daily as needed for Constipation      Coenzyme Q10 (CO Q 10 PO) Take 1 tablet by mouth daily Indications: Omega Q Plus          Allergies:  No Known Allergies     Social History:   reports that she has never smoked. She has never used smokeless tobacco. She reports that she drinks alcohol. She reports that she does not use drugs. Family History:  family history includes Cancer in her mother; Heart Disease in her father and mother.     Physical Exam:  BP (!) 122/54   Pulse 86   Temp 97.4 °F (36.3 °C) (Oral)   Resp 16   Ht 5' 8\" (1.727 m)   Wt 150 lb (68 kg)   SpO2 94%   BMI 22.81 kg/m²       CONSTITUTIONAL: awake, alert, cooperative, no apparent distress  pale  EYES: pupils equal, round and reactive to light, sclera clear and conjunctiva normal  ENT: Normocephalic, without obvious abnormality, atraumatic  NECK: supple, symmetrical, no jugular catheter is   unchanged.  A metallic device projects over the lower left chest.       The heart size and mediastinal contours are stable.       Bilateral pleural effusions are noted, greater on the right, with bibasilar   airspace disease, also greater on the right.  Since the prior study, the left   pleural effusion has developed.  The right pleural effusion is similar in   size bed airspace disease within the right lung base has increased.           Impression   None bilateral pleural effusions with associated bibasilar airspace disease,   greater on the right.  Airspace disease could represent atelectasis and/or   pneumonia. Lynda Deems is considered less likely but not excluded.               Problem List  Active Problems:    Neutropenia ([address]  Resolved Problems:    * No resolved hospital problems. *        Assessment & Plan:     MDS  - S/p 1 cycle of Vidaza, last dose 6/15/19.  - Pancytopenia with profound neutropenia. - Merrem for broad spectrum antibiotic coverage.   - Will given Granix 300 mg daily. - Check EPO level, LDH, haptoglobin. - Transfuse RBC if hgb <7, platelets if <71T or <50K with active bleeding. Atrial fibrillation  - Hold xarelto due to thrombocytopenia.  - Continue cardizem and digoxin. - Cardiology consulted. Hypotension  - Fluid resuscitation. Weakness  - PT & OT to evaluate. Constipation  - MOM and senokot daily PRN. Insomnia  - Will start low dose tazodone nightly PRN. History of breast cancer      Admit as inpatient. I anticipate hospitalization spanning at least two midnights for investigation and treatment of the above medically necessary diagnoses.     Erick Gilbert [facility]  Oncology Hematology [facility]  [phone]    Patient interviewed and examined in office will admit transfuse start iv antibiotics

## 2019-07-03 NOTE — CONSULTS
acetaminophen (TYLENOL) tablet 650 mg, 650 mg, Oral, Q6H PRN  albuterol sulfate  (90 Base) MCG/ACT inhaler 1 puff, 1 puff, Inhalation, Q6H PRN  vitamin D (CHOLECALCIFEROL) tablet 1,000 Units, 1,000 Units, Oral, Daily  digoxin (LANOXIN) tablet 62.5 mcg, 62.5 mcg, Oral, Daily  diltiazem (CARDIZEM CD) extended release capsule 180 mg, 180 mg, Oral, Daily  levothyroxine (SYNTHROID) tablet 100 mcg, 100 mcg, Oral, Daily  lidocaine 4 % external patch 1 patch, 1 patch, Transdermal, Daily  sertraline (ZOLOFT) tablet 100 mg, 100 mg, Oral, Daily  spironolactone (ALDACTONE) tablet 25 mg, 25 mg, Oral, Daily  sodium chloride flush 0.9 % injection 10 mL, 10 mL, Intravenous, 2 times per day  sodium chloride flush 0.9 % injection 10 mL, 10 mL, Intravenous, PRN  magnesium hydroxide (MILK OF MAGNESIA) 400 MG/5ML suspension 30 mL, 30 mL, Oral, Daily PRN  ondansetron (ZOFRAN) injection 4 mg, 4 mg, Intravenous, Q6H PRN  0.9 % sodium chloride infusion, , Intravenous, Continuous  senna (SENOKOT) tablet 8.6 mg, 1 tablet, Oral, Daily PRN  Tbo-Filgrastim (GRANIX) injection 300 mcg, 300 mcg, Subcutaneous, QPM  potassium chloride 10 mEq/100 mL IVPB (Peripheral Line), 10 mEq, Intravenous, PRN  magnesium sulfate 1 g in dextrose 5% 100 mL IVPB, 1 g, Intravenous, PRN  traZODone (DESYREL) tablet 25 mg, 25 mg, Oral, Nightly PRN  polyethylene glycol (GLYCOLAX) packet 17 g, 17 g, Oral, Daily  cefepime (MAXIPIME) 2 g IVPB minibag, 2 g, Intravenous, Q8H    No Known Allergies    Social History:    TOBACCO:   reports that she has never smoked. She has never used smokeless tobacco.  ETOH:   reports that she drinks alcohol.   Patient currently lives independently  Environmental/chemical exposure: None known    Family History:       Problem Relation Age of Onset    Heart Disease Mother     Cancer Mother     Heart Disease Father     High Blood Pressure Neg Hx     High Cholesterol Neg Hx      REVIEW OF SYSTEMS:    CONSTITUTIONAL:  negative for fevers, chills, diaphoresis, activity change, appetite change, fatigue, night sweats and unexpected weight change. EYES:  negative for blurred vision, eye discharge, visual disturbance and icterus  HEENT:  negative for hearing loss, tinnitus, ear drainage, sinus pressure, nasal congestion, epistaxis and snoring  RESPIRATORY:  See HPI  CARDIOVASCULAR:  negative for chest pain, palpitations, exertional chest pressure/discomfort, edema, syncope  GASTROINTESTINAL:  negative for nausea, vomiting, diarrhea, constipation, blood in stool and abdominal pain  GENITOURINARY:  negative for frequency, dysuria, urinary incontinence, decreased urine volume, and hematuria  HEMATOLOGIC/LYMPHATIC:  negative for easy bruising, bleeding and lymphadenopathy  ALLERGIC/IMMUNOLOGIC:  negative for recurrent infections, angioedema, anaphylaxis and drug reactions  ENDOCRINE:  negative for weight changes and diabetic symptoms including polyuria, polydipsia and polyphagia  MUSCULOSKELETAL:  negative for  pain, joint swelling, decreased range of motion and muscle weakness  NEUROLOGICAL:  negative for headaches, slurred speech, unilateral weakness  PSYCHIATRIC/BEHAVIORAL: negative for hallucinations, behavioral problems, confusion and agitation. Objective:   PHYSICAL EXAM:      VITALS:  BP (!) 99/45   Pulse 85   Temp 98.1 °F (36.7 °C) (Oral)   Resp 18   Ht 5' 8\" (1.727 m)   Wt 150 lb (68 kg)   SpO2 95%   BMI 22.81 kg/m²      24HR INTAKE/OUTPUT:      Intake/Output Summary (Last 24 hours) at 7/3/2019 0835  Last data filed at 7/3/2019 0915  Gross per 24 hour   Intake 1721.83 ml   Output --   Net 1721.83 ml     CONSTITUTIONAL:  awake, alert, cooperative, no apparent distress, and appears stated age  NECK:  Supple, symmetrical, trachea midline, no adenopathy, thyroid symmetric, not enlarged and no tenderness, skin normal  LUNGS:  no increased work of breathing and clear to auscultation.  No accessory muscle use  CARDIOVASCULAR: S1 and S2, no edema

## 2019-07-03 NOTE — CONSULTS
[place]. 4 children. 6 grandchildren. Family History:  No evidence for sudden cardiac death or premature CAD. Problem Relation Age of Onset    Heart Disease Mother     Cancer Mother     Heart Disease Father     High Blood Pressure Neg Hx     High Cholesterol Neg Hx        Medications:    pill splitter    polyethylene glycol (GLYCOLAX) packet 17 g Daily PRN   senna (SENOKOT) tablet 17.2 mg Nightly   HYDROcodone-acetaminophen (NORCO) 5-325 MG per tablet 1 tablet Q6H PRN   acetaminophen (TYLENOL) tablet 650 mg Q6H PRN   albuterol sulfate  (90 Base) MCG/ACT inhaler 1 puff Q6H PRN   vitamin D (CHOLECALCIFEROL) tablet 1,000 Units Daily   digoxin (LANOXIN) tablet 62.5 mcg Daily   levothyroxine (SYNTHROID) tablet 100 mcg Daily   lidocaine 4 % external patch 1 patch Daily   sertraline (ZOLOFT) tablet 100 mg Daily   sodium chloride flush 0.9 % injection 10 mL 2 times per day   sodium chloride flush 0.9 % injection 10 mL PRN   magnesium hydroxide (MILK OF MAGNESIA) 400 MG/5ML suspension 30 mL Daily PRN   ondansetron (ZOFRAN) injection 4 mg Q6H PRN   0.9 % sodium chloride infusion Continuous   senna (SENOKOT) tablet 8.6 mg Daily PRN   Tbo-Filgrastim (GRANIX) injection 300 mcg QPM   potassium chloride 10 mEq/100 mL IVPB (Peripheral Line) PRN   magnesium sulfate 1 g in dextrose 5% 100 mL IVPB PRN   traZODone (DESYREL) tablet 25 mg Nightly PRN   cefepime (MAXIPIME) 2 g IVPB minibag Q8H       Allergies:  Patient has no known allergies.      Review of Systems:   [x]Full ROS obtained and negative except as mentioned in HPI    Physical Examination:    BP (!) 99/52   Pulse 71   Temp 98.3 °F (36.8 °C) (Axillary)   Resp 18   Ht 5' 8\" (1.727 m)   Wt 150 lb (68 kg)   SpO2 96%   BMI 22.81 kg/m²   Wt Readings from Last 3 Encounters:   07/02/19 150 lb (68 kg)   06/19/19 147 lb (66.7 kg)   06/14/19 144 lb (65.3 kg)       GENERAL: Well developed, well nourished, no acute distress  NEUROLOGICAL: Alert and oriented

## 2019-07-03 NOTE — DISCHARGE INSTR - COC
3 completed shifts: In: 320.8 [Blood:320.8]  Out: -     Safety Concerns: At Risk for Falls    Impairments/Disabilities:      None    Nutrition Therapy:  Current Nutrition Therapy:   - Oral Diet:  General and Low microbial    Routes of Feeding: Oral  Liquids: Thin Liquids  Daily Fluid Restriction: no  Last Modified Barium Swallow with Video (Video Swallowing Test): not done    Treatments at the Time of Hospital Discharge:   Respiratory Treatments: ***  Oxygen Therapy:  is not on home oxygen therapy.  O2 applied for comfort  Ventilator:    - No ventilator support    Rehab Therapies: SN,PT,OT  Weight Bearing Status/Restrictions: No weight bearing restirctions  Other Medical Equipment (for information only, NOT a DME order):  wheelchair  Other Treatments:   HOME HEALTH CARE: LEVEL 4 SICK        -Initial home health evaluation to occur within 24-48 hours, in patient home    -Home health agency to establish plan of care for patient over 60 day period    -Medication Reconciliation    -PT/OT/Speech evaluations in home within 24-48 hours of discharge; including  -DME and home safety    -Frontload therapy 5 days, then 3x a week    -OT to evaluate if patient has [address] needs for personal care    -Frontload nursing visits daily, then qod with progression as warranted; establish   plan for 60-day period    -Nursing to perform telephone visit on the days that a visit is not being made in   person for the first 30 days    -Palliative Care referral    -Dietician evaluation within five days of discharge    -PCP visit within three days of discharge, followed by visit at 10 days, and 21   days    - evaluation within 24-48 hours, includes evaluation of resources   and insurance to determine [place], [place], LTC, and Medicaid options       Patient's personal belongings (please select all that are sent with patient):  Glasses    RN SIGNATURE:  Electronically signed by Rhett Tamez RN on 7/11/19 at 12:53 PM    CASE

## 2019-07-04 NOTE — PROGRESS NOTES
Cardiovascular Progress Note      Chief Complaint:   Weakness    Impression/Recommendations:    Fall  MDS  Pancytopenia  Hypotension  Pneumonia  Small bilateral pleural effusions  HFpEF (LVEF 50-55%), compensated   Persistent Atrial fibrillation, asymptomatic, rate controlled, [id number]= 4  Hyperlipidemia   Hypothyroidism  DNR-CC         Digoxin 62.5 mcg can be increased to 125 mcg  ( Digoxin level  <0.3)   AFib is currently rate controlled off of Diltiazem- held due to low blood pressures     Hold DOAC with falls and TCP    Cefepime per primary and pulmonology services      No further cardiac testing at this time.        Interval History:   Blood pressure still fluctuating to low normal. Asymptomatic. No events overnight. Up in chair for 1.5 hours this morning and feels mild improvement from yesterday. Denies palpitations, SOB, CP.   Tele: AFib 70s no events      Medications:    digoxin (LANOXIN) tablet 125 mcg Daily   polyethylene glycol (GLYCOLAX) packet 17 g Daily PRN   senna (SENOKOT) tablet 17.2 mg Nightly   HYDROcodone-acetaminophen (NORCO) 5-325 MG per tablet 1 tablet Q6H PRN   acetaminophen (TYLENOL) tablet 650 mg Q6H PRN   albuterol sulfate  (90 Base) MCG/ACT inhaler 1 puff Q6H PRN   vitamin D (CHOLECALCIFEROL) tablet 1,000 Units Daily   levothyroxine (SYNTHROID) tablet 100 mcg Daily   lidocaine 4 % external patch 1 patch Daily   sertraline (ZOLOFT) tablet 100 mg Daily   sodium chloride flush 0.9 % injection 10 mL 2 times per day   sodium chloride flush 0.9 % injection 10 mL PRN   magnesium hydroxide (MILK OF MAGNESIA) 400 MG/5ML suspension 30 mL Daily PRN   ondansetron (ZOFRAN) injection 4 mg Q6H PRN   0.9 % sodium chloride infusion Continuous   senna (SENOKOT) tablet 8.6 mg Daily PRN   Tbo-Filgrastim (GRANIX) injection 300 mcg QPM   potassium chloride 10 mEq/100 mL IVPB (Peripheral Line) PRN   magnesium sulfate 1 g in dextrose 5% 100 mL IVPB PRN   traZODone (DESYREL) tablet 25 mg Nightly PRN

## 2019-07-04 NOTE — PROGRESS NOTES
Hand off report received Yvette Hayes. Pt. Resting comfortably in bed with family at bedside. Will continue to monitor.  Ruth Hopkins 3:35 PM

## 2019-07-04 NOTE — PROGRESS NOTES
While responding to Pt's call light, Pt reported to this RN that she took a synthroid from a bottle in her belongings. The med has been collected and is stored in main pharmacy.

## 2019-07-04 NOTE — PROGRESS NOTES
[facility]   Progress Note      7/4/2019 2:29 PM    Patient Information:  Ciara Matos is a 81 y.o. female [id number]  PCP:  ANABELL Mohamud CNP (Tel: [phone] )    Primary Oncologist: Clay Salomon MD    Chief complaint: Weakness    Subjective:    Feels somewhat better. No sores in the mouth. No chest pain palpitation shortness of breath  No abdominal pain has some constipation and is taking MiraLAX. No swelling in the legs  No fever or chills. Feels weak and tired. History of Present Illness:  Autumn Soliz is a 81 y.o. female patient of Clay Salomon MD who is treated for MDS. She has received 1 cycle of Vidaza 6/10 - 6/15. She came to the office 7/2/19 for follow up. She is weak and fatigued. She fell at home. She has a large bruise on her left arm from the fall. She reports shortness of breath. She has pancytopenia with profound neutropenia. She denies fevers and chills. She is hypotensive. She received a fluid bolus in the office. She has a history of atrial fibrillation. She is on multiple antihypertensive medications. She is also on Xarelto. Physical Exam:  BP (!) 119/56   Pulse 85   Temp 98.3 °F (36.8 °C) (Axillary)   Resp 16   Ht 5' 8\" (1.727 m)   Wt 150 lb (68 kg)   SpO2 94%   BMI 22.81 kg/m²       Conscious alert oriented  HEENT: + pallor or scleral icterus. Oral mucosa: No mucositis. No thrush. Neck: Supple, no lymphadenopathy. Lungs: Basal crackles +. Abdomen: Soft, bowel sounds are heard. No tenderness. Neuro: No focal deficits. No cranial nerve palsies. Alert and oriented. Skin: No rashes, petechiae, ecchymosis. Extremities: No edema, tenderness, erythema.     Labs:  CBC:   Lab Results   Component Value Date    WBC 1.3 07/04/2019    RBC 2.14 07/04/2019    RBC 4.29 11/08/2016    HGB 7.5 07/04/2019    HCT 21.9 07/04/2019    .4 07/04/2019    MCH 35.2 07/04/2019    MCHC 34.4 07/04/2019    RDW 16.8 07/04/2019

## 2019-07-04 NOTE — PROGRESS NOTES
[facility] Pulmonary/CCM Progress note      Admit Date: 7/2/2019    Chief Complaint: Shortness of breath    Subjective: Interval History: Still has some dyspnea, although on room air. Denies any cough, phlegm or chest pain. Feels generally weak and tired. No fevers documented.     Scheduled Meds:   digoxin  125 mcg Oral Daily    senna  2 tablet Oral Nightly    vitamin D  1,000 Units Oral Daily    levothyroxine  100 mcg Oral Daily    lidocaine  1 patch Transdermal Daily    sertraline  100 mg Oral Daily    sodium chloride flush  10 mL Intravenous 2 times per day    Tbo-Filgrastim  300 mcg Subcutaneous QPM    cefepime  2 g Intravenous Q8H     Continuous Infusions:   sodium chloride 100 mL/hr at 07/04/19 0459     PRN Meds:polyethylene glycol, HYDROcodone 5 mg - acetaminophen, acetaminophen, albuterol sulfate HFA, sodium chloride flush, magnesium hydroxide, ondansetron, senna, potassium chloride, magnesium sulfate, traZODone    Review of Systems  Constitutional: Fatigue and weight loss+, negative for fevers, malaise   Ears, nose, mouth, throat: negative for ear drainage, epistaxis, hoarseness, nasal congestion, sore throat and voice change  Respiratory: negative except for shortness of breath  Cardiovascular: negative for chest pain, chest pressure/discomfort, irregular heart beat, lower extremity edema and palpitations  Gastrointestinal: negative for abdominal pain, constipation, diarrhea, jaundice, melena, odynophagia, reflux symptoms and vomiting  Hematologic/lymphatic: negative for bleeding, easy bruising, lymphadenopathy and petechiae  Musculoskeletal:negative for arthralgias, bone pain, muscle weakness, neck pain and stiff joints  Neurological: negative for dizziness, gait problems, headaches, seizures, speech problems, tremors and weakness  Behavioral/Psych: negative for anxiety, behavior problems, depression, fatigue and sleep disturbance  Endocrine: negative for diabetic symptoms including none,

## 2019-07-05 NOTE — PROGRESS NOTES
Blood transfusion started. VSS. Consent signed earlier this admission and is in the paper chart. Pt does not show any signs or symptoms of transfusion reaction.

## 2019-07-05 NOTE — PROGRESS NOTES
Occupational Therapy   Occupational Therapy Initial Assessment  Date: 2019   Patient Name: Tyrone Lala  MRN: [id number]     : 1935    Date of Service: 2019    Discharge Recommendations:       Tyrone Lala scored a 15/24 on the AM-PAC ADL Inpatient form. Current research shows that an AM-PAC score of 17 or less is typically not associated with a discharge to the patient's home setting. Based on the patients AM-PAC score and their current ADL deficits, it is recommended that the patient have 3-5 sessions per week of Occupational Therapy at d/c to increase the patients independence. Assessment   Performance deficits / Impairments: Decreased functional mobility ; Decreased ADL status; Decreased high-level IADLs;Decreased endurance  Treatment Diagnosis: decreased independence with ADL related to late affects of MDS. Prognosis: Good  Decision Making: Medium Complexity  Exam: ADL, mobility, transfers  Assistance / Modification: physical assistance, rw  Patient Education: OT eval, plan of care, DC needs  Barriers to Learning:    REQUIRES OT FOLLOW UP: Yes  Activity Tolerance  Activity Tolerance: Treatment limited secondary to medical complications (free text)  Activity Tolerance: elevated HR to 140 with min activity  Safety Devices  Safety Devices in place: Yes  Type of devices: All fall risk precautions in place; Patient at risk for falls;Call light within reach; Chair alarm in place; Left in chair;Nurse notified           Patient Diagnosis(es): There were no encounter diagnoses. has a past medical history of Arthritis, Atrial fibrillation ([address] Cancer ([address] Carotid artery stenosis, Chronic kidney disease, Community acquired pneumonia, Depression, Fx ankle, History of blood transfusion, Hyperlipidemia, Hypertension, Hypothyroidism, Movement disorder, Palpitation, Pleural effusion, Psychiatric problem, Shingles, Syncope and collapse, and Thyroid disease.    has a past surgical history that assistance  Transfers  Stand Pivot Transfers: Minimal assistance  Sit to stand: Minimal assistance  Stand to sit: Minimal assistance     Cognition  Overall Cognitive Status: WNL                 LUE AROM (degrees)  LUE AROM : WNL  RUE AROM (degrees)  RUE AROM : WNL                      Plan   Plan  Times per week: 3-5  Times per day: Daily  Current Treatment Recommendations: Balance Training, Patient/Caregiver Education & Training, Self-Care / ADL, Functional Mobility Training, Safety Education & Training, Endurance Training    G-Code     OutComes Score                                                  AM-PAC Score        AM-PAC Inpatient Daily Activity Raw Score: 15 (07/05/19 1301)  AM-PAC Inpatient ADL T-Scale Score : 34.69 (07/05/19 1301)  ADL Inpatient CMS 0-100% Score: 56.46 (07/05/19 1301)  ADL Inpatient CMS G-Code Modifier : CK (07/05/19 1301)    Goals  Short term goals  Short term goal 1: increase am pac score to 18 from 15  Short term goal 2: stand by assist for all functional mobility with use of rw during self care  Patient Goals   Patient goals : patient's goal is to go home following a short rehab stay       Therapy Time   Individual Concurrent Group Co-treatment   Time In 0910         Time Out 1005         Minutes 55         Timed Code Treatment Minutes: Angela Payton OT

## 2019-07-05 NOTE — PROGRESS NOTES
[facility] home care referral. Spoke with pt and re: home care plan of care/services. Agreeable. Will follow for home care.

## 2019-07-06 NOTE — PROGRESS NOTES
89  Current Blood Pressure:  BP: 130/79  24hr Blood Pressure Range:  Systolic (48UVI), JZD:804 , Min:103 , ENF:669   ; Diastolic (31FRB), WWC:71, Min:51, Max:86    Current Pulse Oximetry:  SpO2: 95 %      Intake/Output Summary (Last 24 hours) at 7/6/2019 0834  Last data filed at 7/5/2019 1944  Gross per 24 hour   Intake 300 ml   Output 100 ml   Net 200 ml       Telemetry monitor:  AF VR 80-90s    Physical Exam:  General:  Awake, alert, NAD  Skin:  Warm and dry  Neck:  No JVD  Chest:  Coarse bibasilar to auscultation, respiration easy  Cardiovascular:  RRR S1S2 no murmur to auscultation  Abdomen: Bowel sounds normal, abd soft, non-tender  Extremities: trace morgan LE edema  : unremarkable      Imaging  CXR: 7/5/19  FINDINGS:   Cardiac silhouette is enlarged.  Small to moderate bilateral pleural   effusions, greater on the right.  These appear increased.  Patchy hazy   airspace disease in the upper and lower right lung and left lung.  This is   also progressed.  No acute bony abnormality.  Left central venous catheter   with tip projecting in the region of the lower SVC.           Impression     CXR: 7/2/19:  Bilateral pleural effusions are noted, greater on the right, with bibasilar   airspace disease, also greater on the right.  Since the prior study, the left   pleural effusion has developed.  The right pleural effusion is similar in   size bed airspace disease within the right lung base has increased.           Impression   None bilateral pleural effusions with associated bibasilar airspace disease,   greater on the right.  Airspace disease could represent atelectasis and/or   pneumonia. Jean-Paul Alicea is considered less likely but not excluded.        Lab Review     Renal Profile:   Lab Results   Component Value Date    CREATININE 0.5 07/06/2019    BUN 12 07/06/2019     07/06/2019    K 3.6 07/06/2019     07/06/2019    CO2 26 07/06/2019     CBC:    Lab Results   Component Value Date    WBC 1.7 07/06/2019    RBC

## 2019-07-07 NOTE — PROGRESS NOTES
part of this visit. Narrative   EXAMINATION:   ONE XRAY VIEW OF THE CHEST       7/2/2019 6:27 pm       COMPARISON:   Prior studies including most recent chest 06/07/2019 , CT 12/14/2018, chest   x-ray March 2016       HISTORY:   ORDERING SYSTEM PROVIDED HISTORY: shortness of breath   TECHNOLOGIST PROVIDED HISTORY:   Reason for exam:->shortness of breath   Ordering Physician Provided Reason for Exam: SOB   Acuity: Unknown   Type of Exam: Unknown       FINDINGS:   Metallic hardware projects over proximal left humerus compatible with prior   fracture.  There is evidence of prior augmentation of L1 compression fracture   with radiopaque segment.  A left subclavian central venous catheter is   unchanged.  A metallic device projects over the lower left chest.       The heart size and mediastinal contours are stable.       Bilateral pleural effusions are noted, greater on the right, with bibasilar   airspace disease, also greater on the right.  Since the prior study, the left   pleural effusion has developed.  The right pleural effusion is similar in   size bed airspace disease within the right lung base has increased.           Impression   None bilateral pleural effusions with associated bibasilar airspace disease,   greater on the right.  Airspace disease could represent atelectasis and/or   pneumonia. Henery Fus is considered less likely but not excluded. Problem List:     Community-acquired pneumonia  Small bilateral pleural effusions  Pancytopenia  MDS    Assessment/Plan:     Patient has myelodysplastic syndrome, recently started on Vidaza. Pancytopenia-improved white count to 3.2. Hemoglobin stable. PlateletS 17. Continue with IV Lasix today, will postpone chest x-ray to be performed tomorrow. Monitor urine output. Creatinine is stable. Might benefit from home oxygen if continues to remain hypoxic with activity. Discussed with daughter about the plan.   Family not ready for hospice care, since they would like to monitor patient's progress over the next few weeks. Have decided against continuing chemotherapy given her current deterioration.     Jaison Vora MD

## 2019-07-07 NOTE — PROGRESS NOTES
tomorrow. Long talk with the patient and her daughter. Not interested in hospice at present. Continue supportive care. Discussed with Dr. Lux Soliz. Jeanna De Souza M.D.; M.S. Medical Oncology/Hematology  Phone: [phone]  Fax: [phone]    [address] # [facility], [address]   [facility]

## 2019-07-08 NOTE — PROGRESS NOTES
Cardiovascular Progress Note      Chief Complaint:   AF    Impression/Recommendations:  MDS  Pancytopenia,stable   Pneumonia    HFpEF (LVEF 50-55%, mod MR) -Chronic diastolic HF with bilateral pleural effusions  Persistent Atrial fibrillation, asymptomatic, rate controlled, [id number]= 4  DNR-CC         Digoxin 125 mcg daily now;  AFib is currently rate controlled off of Diltiazem, which was held due to low blood pressures since admission   DOAC on hold with falls and TCP  IV Lasix daily and PRN with transfusions - continue given stable renal status   Further recs pending Echo update today (last 11/2018)      Interval History:   No events overnight. No family at bedside. Avelina Andrade is sitting up- able to eat half of breakfast.  She remains on room air and denies shortness of breath but says there is a slight catching of her breath with activity and longer conversation. Otherwise, her chest remains calm without pain or palpitations.     Tele: AFib 80s no events  147 lbs (baseline was 142 prior to admission)    Medications:    furosemide (LASIX) injection 20 mg Daily   senna (SENOKOT) tablet 17.2 mg Nightly PRN   digoxin (LANOXIN) tablet 125 mcg Daily   megestrol (MEGACE) 40 MG/ML suspension 400 mg Daily   polyethylene glycol (GLYCOLAX) packet 17 g Daily PRN   HYDROcodone-acetaminophen (NORCO) 5-325 MG per tablet 1 tablet Q6H PRN   acetaminophen (TYLENOL) tablet 650 mg Q6H PRN   albuterol sulfate  (90 Base) MCG/ACT inhaler 1 puff Q6H PRN   vitamin D (CHOLECALCIFEROL) tablet 1,000 Units Daily   levothyroxine (SYNTHROID) tablet 100 mcg Daily   lidocaine 4 % external patch 1 patch Daily   sertraline (ZOLOFT) tablet 100 mg Daily   sodium chloride flush 0.9 % injection 10 mL 2 times per day   sodium chloride flush 0.9 % injection 10 mL PRN   magnesium hydroxide (MILK OF MAGNESIA) 400 MG/5ML suspension 30 mL Daily PRN   ondansetron (ZOFRAN) injection 4 mg Q6H PRN   senna (SENOKOT) tablet 8.6 mg Daily PRN   Tbo-Filgrastim [facility]) injection 300 mcg QPM   potassium chloride 10 mEq/100 mL IVPB (Peripheral Line) PRN   magnesium sulfate 1 g in dextrose 5% 100 mL IVPB PRN   traZODone (DESYREL) tablet 25 mg Nightly PRN   cefepime (MAXIPIME) 2 g IVPB minibag Q8H       Physical Exam:    BP (!) 105/47   Pulse 84   Temp 98.1 °F (36.7 °C) (Oral)   Resp 16   Ht 5' 8\" (1.727 m)   Wt 147 lb 3.2 oz (66.8 kg)   SpO2 92%   BMI 22.38 kg/m²   Wt Readings from Last 3 Encounters:   07/08/19 147 lb 3.2 oz (66.8 kg)   06/19/19 147 lb (66.7 kg)   06/14/19 144 lb (65.3 kg)       GENERAL: Well developed, well nourished, no acute distress  NEUROLOGICAL: Alert and oriented x3  PSYCH: Normal mood and affect   SKIN: Warm and dry, without lesions  HEENT: Normocephalic, atraumatic, Sclera non-icteric, mucous membranes moist  NECK: supple, JVP normal, thyroid not enlarged   CAROTID: Normal upstroke, no bruits  CARDIAC: Normal PMI, irregular   rhythm, normal S1S2, no murmur, rub  RESPIRATORY: Normal respiratory effort, bibasilar crackles  EXTREMITIES: No cyanosis, clubbing or edema, palpable pulses bilaterally   MUSCULOSKELETAL: No joint swelling or tenderness, no chest wall tenderness  GASTROINTESTINAL:  soft, non-tender, no bruit      Data Review:    CBC:   Recent Labs     07/06/19 0621 07/07/19 0530 07/08/19  0723   WBC 1.7* 3.2* 5.6   HGB 9.3* 9.3* 9.3*   HCT 26.2* 27.1* 27.3*   MCV 98.4 99.2 100.2*   PLT 17* 17* 19*     BMP:   Recent Labs     07/06/19 0621 07/07/19  0530 07/08/19  0723    136 136   K 3.6 3.8 3.8    100 100   CO2 26 29 26   BUN 12 16 18   CREATININE 0.5* <0.5* 0.5*   GFRAA >60 >60 >60     LFTS:   Recent Labs     07/06/19  0621 07/07/19  0530 07/08/19  0723   ALT 10 8* 7*   AST 15 14* 16   ALKPHOS 56 55 59   PROT 6.1* 6.2* 6.0*   AGRATIO 1.1 0.9* 1.1   BILITOT 2.5* 1.7* 1.5*         Nahid Chavez D.O., [address] [facility]  Interventional Cardiology     o: [phone]  [facility] [address], [address]      NOTE:  This

## 2019-07-08 NOTE — PROGRESS NOTES
Oncology [facility]ematology Care   Progress Note      7/8/2019 10:09 AM        Name: Zeny Donnelly . Admitted: 7/2/2019    SUBJECTIVE:  She is feeling better, remains weak, plans to go to SNF for rehab. Platelets remain low, no signs of bleeding.   C/o mild SOB, CXR has been ordered by pulmonary    Reviewed interval ancillary notes    Current Medications    furosemide (LASIX) injection 20 mg Daily   senna (SENOKOT) tablet 17.2 mg Nightly PRN   digoxin (LANOXIN) tablet 125 mcg Daily   megestrol (MEGACE) 40 MG/ML suspension 400 mg Daily   polyethylene glycol (GLYCOLAX) packet 17 g Daily PRN   HYDROcodone-acetaminophen (NORCO) 5-325 MG per tablet 1 tablet Q6H PRN   acetaminophen (TYLENOL) tablet 650 mg Q6H PRN   albuterol sulfate  (90 Base) MCG/ACT inhaler 1 puff Q6H PRN   vitamin D (CHOLECALCIFEROL) tablet 1,000 Units Daily   levothyroxine (SYNTHROID) tablet 100 mcg Daily   lidocaine 4 % external patch 1 patch Daily   sertraline (ZOLOFT) tablet 100 mg Daily   sodium chloride flush 0.9 % injection 10 mL 2 times per day   sodium chloride flush 0.9 % injection 10 mL PRN   magnesium hydroxide (MILK OF MAGNESIA) 400 MG/5ML suspension 30 mL Daily PRN   ondansetron (ZOFRAN) injection 4 mg Q6H PRN   senna (SENOKOT) tablet 8.6 mg Daily PRN   Tbo-Filgrastim (GRANIX) injection 300 mcg QPM   potassium chloride 10 mEq/100 mL IVPB (Peripheral Line) PRN   magnesium sulfate 1 g in dextrose 5% 100 mL IVPB PRN   traZODone (DESYREL) tablet 25 mg Nightly PRN   cefepime (MAXIPIME) 2 g IVPB minibag Q8H       Objective:  BP (!) 117/59   Pulse 87   Temp 98.4 °F (36.9 °C) (Oral)   Resp 16   Ht 5' 8\" (1.727 m)   Wt 147 lb 3.2 oz (66.8 kg)   SpO2 93%   BMI 22.38 kg/m²     Intake/Output Summary (Last 24 hours) at 7/8/2019 1009  Last data filed at 7/8/2019 0702  Gross per 24 hour   Intake 130 ml   Output 600 ml   Net -470 ml    Wt Readings from Last 3 Encounters:   07/08/19 147 lb 3.2 oz (66.8 kg)   06/19/19 147 lb (66.7 kg) Saundra Stewart, [facility]  Oncology Hematology Care    Patient seen and examined. Agree with above. Patient has been afebrile. Cultures have been negative. We will stop Granix. Continue cefepime for pneumonia.     Dana Reese MD

## 2019-07-08 NOTE — PLAN OF CARE
Patient will be free of injury this shift. Patient is encouraged to call for assistance prior to getting out of bed. Call light within reach, bed in lowest position and locked, and bed alarm engaged. Non-slip socks on both feet. Bedside table within reach. Room and floor are free of clutter. Patient will remain free of impaired skin integrity during this shift. Patient will change positions at least every 2 hours to prevent skin breakdown and tissue injury. Precautions will remain in effect to prevent patient from experiencing skin breakdown: appropriate mattress for patient, heels elevated off bed and/or heel protectors,  sacral border, and bony prominences elevated. Patient's pain will decrease this shift. Patient verbalizes understanding on how to notify nursing staff when pain arises. Patient also verbalizes understanding on the use of pharmacologic and non-pharmacologic pain interventions.

## 2019-07-08 NOTE — PROGRESS NOTES
A x 2)                       Cognition  Overall Cognitive Status: WNL                                         Plan   Plan  Times per week: 3-5  Times per day: Daily  Current Treatment Recommendations: Balance Training, Patient/Caregiver Education & Training, Self-Care / ADL, Functional Mobility Training, Safety Education & Training, Endurance Training  G-Code     OutComes Score                                                  AM-PAC Score        AM-PAC Inpatient Daily Activity Raw Score: 15 (07/08/19 1551)  AM-PAC Inpatient ADL T-Scale Score : 34.69 (07/08/19 1551)  ADL Inpatient CMS 0-100% Score: 56.46 (07/08/19 1551)  ADL Inpatient CMS G-Code Modifier : CK (07/08/19 1551)    Goals  Short term goals  Short term goal 1: increase am pac score to 18 from 15 - goal not met 7/8  Short term goal 2: stand by assist for all functional mobility with use of rw during self care - goal not met 7/8  Patient Goals   Patient goals : patient's goal is to go home following a short rehab stay       Therapy Time   Individual Concurrent Group Co-treatment   Time In 1527         Time Out 1541         Minutes 14         Timed Code Treatment Minutes: [address], [address]      I have reviewed and am in agreement with this treatment session.     SOPHIA Franco OTR/L [id number]

## 2019-07-08 NOTE — PROGRESS NOTES
of this visit. Narrative   EXAMINATION:   ONE XRAY VIEW OF THE CHEST       7/2/2019 6:27 pm       COMPARISON:   Prior studies including most recent chest 06/07/2019 , CT 12/14/2018, chest   x-ray March 2016       HISTORY:   ORDERING SYSTEM PROVIDED HISTORY: shortness of breath   TECHNOLOGIST PROVIDED HISTORY:   Reason for exam:->shortness of breath   Ordering Physician Provided Reason for Exam: SOB   Acuity: Unknown   Type of Exam: Unknown       FINDINGS:   Metallic hardware projects over proximal left humerus compatible with prior   fracture.  There is evidence of prior augmentation of L1 compression fracture   with radiopaque segment.  A left subclavian central venous catheter is   unchanged.  A metallic device projects over the lower left chest.       The heart size and mediastinal contours are stable.       Bilateral pleural effusions are noted, greater on the right, with bibasilar   airspace disease, also greater on the right.  Since the prior study, the left   pleural effusion has developed.  The right pleural effusion is similar in   size bed airspace disease within the right lung base has increased.           Impression   None bilateral pleural effusions with associated bibasilar airspace disease,   greater on the right.  Airspace disease could represent atelectasis and/or   pneumonia. Myke Kast is considered less likely but not excluded. Problem List:     Community-acquired pneumonia  Small bilateral pleural effusions  Pancytopenia  MDS    Assessment/Plan:     Patient has myelodysplastic syndrome, recently started on Vidaza-poor tolerance. Pancytopenia-improved white count to 5. Could possibly stop Granix today-defer to oncology. Still has profound thrombocytopenia. Continue with IV Lasix, normal creatinine. Await chest x-ray today-discussed with patient's nurse about the importance to have this done. Await for 2D echo.     Might benefit from home oxygen if continues to remain hypoxic with

## 2019-07-08 NOTE — PROGRESS NOTES
Bedside report completed Marika Moran RN. Per night shift RN, eugenia not providing blood return. Lab has been notified to collect blood. MD paged for cath silvia.

## 2019-07-09 NOTE — PROGRESS NOTES
Patient in bed eating fruit and aspirated. Patient's daughter at bedside and yelled out to nurses station \"I need help\". Vitals stable. Patient remains alert and coughing. NC at 2L/min applied for comfort.

## 2019-07-09 NOTE — PROGRESS NOTES
regurgitation.   -The left atrium is dilated.   -Mild to moderate tricuspid regurgitation with PASP of 37 mmHg.   -The right atrial size is dilated.     Medications:    sodium chloride 0.9 % infusion    furosemide (LASIX) injection 20 mg Daily   senna (SENOKOT) tablet 17.2 mg Nightly PRN   digoxin (LANOXIN) tablet 125 mcg Daily   megestrol (MEGACE) 40 MG/ML suspension 400 mg Daily   polyethylene glycol (GLYCOLAX) packet 17 g Daily PRN   HYDROcodone-acetaminophen (NORCO) 5-325 MG per tablet 1 tablet Q6H PRN   acetaminophen (TYLENOL) tablet 650 mg Q6H PRN   albuterol sulfate  (90 Base) MCG/ACT inhaler 1 puff Q6H PRN   vitamin D (CHOLECALCIFEROL) tablet 1,000 Units Daily   levothyroxine (SYNTHROID) tablet 100 mcg Daily   lidocaine 4 % external patch 1 patch Daily   sertraline (ZOLOFT) tablet 100 mg Daily   sodium chloride flush 0.9 % injection 10 mL 2 times per day   sodium chloride flush 0.9 % injection 10 mL PRN   magnesium hydroxide (MILK OF MAGNESIA) 400 MG/5ML suspension 30 mL Daily PRN   ondansetron (ZOFRAN) injection 4 mg Q6H PRN   senna (SENOKOT) tablet 8.6 mg Daily PRN   potassium chloride 10 mEq/100 mL IVPB (Peripheral Line) PRN   magnesium sulfate 1 g in dextrose 5% 100 mL IVPB PRN   traZODone (DESYREL) tablet 25 mg Nightly PRN   cefepime (MAXIPIME) 2 g IVPB minibag Q8H       Physical Exam:    /74   Pulse 73   Temp 97.7 °F (36.5 °C) (Oral)   Resp 16   Ht 5' 8\" (1.727 m)   Wt 143 lb 12.8 oz (65.2 kg)   SpO2 94%   BMI 21.86 kg/m²   Wt Readings from Last 3 Encounters:   07/09/19 143 lb 12.8 oz (65.2 kg)   06/19/19 147 lb (66.7 kg)   06/14/19 144 lb (65.3 kg)       GENERAL: Well developed, well nourished, no acute distress  NEUROLOGICAL: Alert and oriented x3  PSYCH: Normal mood and affect   SKIN: Warm and dry, without lesions  HEENT: Normocephalic, atraumatic, Sclera non-icteric, mucous membranes moist  NECK: supple, JVP normal, thyroid not enlarged   CAROTID: Normal upstroke, no bruits  CARDIAC: Normal PMI, irregular   rhythm, normal S1S2, no murmur, rub  RESPIRATORY: Normal respiratory effort, bibasilar crackles  EXTREMITIES: No cyanosis, clubbing or edema, palpable pulses bilaterally   MUSCULOSKELETAL: No joint swelling or tenderness, no chest wall tenderness  GASTROINTESTINAL:  soft, non-tender, no bruit      Data Review:    CBC:   Recent Labs     07/06/19  0621 07/07/19  0530 07/08/19  0723 07/09/19  0500   WBC 1.7* 3.2* 5.6 4.0   HGB 9.3* 9.3* 9.3* 9.5*   HCT 26.2* 27.1* 27.3* 27.6*   MCV 98.4 99.2 100.2* 100.6*   PLT 17* 17* 19*  --      BMP:   Recent Labs     07/07/19  0530 07/08/19  0723 07/09/19  0500    136 138   K 3.8 3.8 4.1    100 101   CO2 29 26 28   BUN 16 18 18   CREATININE <0.5* 0.5* <0.5*   GFRAA >60 >60 >60     LFTS:   Recent Labs     07/07/19  0530 07/08/19  0723 07/09/19  0500   ALT 8* 7* 7*   AST 14* 16 16   ALKPHOS 55 59 82   PROT 6.2* 6.0* 6.7   AGRATIO 0.9* 1.1 1.0*   BILITOT 1.7* 1.5* 1.1*         [place] D.O., [facility] [place]  Interventional Cardiology     o: [phone]  [facility] [employer], [address], [address]      NOTE:  This report was transcribed using voice recognition software. Every effort was made to ensure accuracy; however, inadvertent computerized transcription errors may be present.

## 2019-07-09 NOTE — PROGRESS NOTES
clear. Pupils equal.  ENT: Moist oral mucosa. Trachea midline, no adenopathy. Cardiovascular: Regular rhythm, normal S1, S2. No murmur. No edema in lower extremities  Respiratory: Not using accessory muscles. Good inspiratory effort. Clear to auscultation bilaterally, no wheeze or crackles. GI: Abdomen soft, no tenderness, not distended  Musculoskeletal: No cyanosis in digits, neck supple  Neurology: CN 2-12 grossly intact. No speech or motor deficits  Psych: Normal affect. Alert and oriented in time, place and person  Skin: Warm, dry, normal turgor    Labs and Tests:  CBC:   Recent Labs     07/07/19  0530 07/08/19  0723 07/09/19  0500   WBC 3.2* 5.6 4.0   HGB 9.3* 9.3* 9.5*   PLT 17* 19*  --      BMP:    Recent Labs     07/07/19 0530 07/08/19  0723 07/09/19  0500    136 138   K 3.8 3.8 4.1    100 101   CO2 29 26 28   BUN 16 18 18   CREATININE <0.5* 0.5* <0.5*   GLUCOSE 94 91 100*     Hepatic:   Recent Labs     07/07/19 0530 07/08/19  0723 07/09/19  0500   AST 14* 16 16   ALT 8* 7* 7*   BILITOT 1.7* 1.5* 1.1*   ALKPHOS 55 59 82       ASSESSMENT AND PLAN    Active Problems:    Neutropenia ([address]    Community acquired pneumonia    Pancytopenia (HCC)    Pleural effusion, bilateral    Chronic diastolic heart failure (HCC)  Resolved Problems:    * No resolved hospital problems. *    Severe pancytopenia due to myelodysplastic syndrome. - received one course of Vidaza, counts are worse with chemotherapy  - s/p RBC and platelet transfusions   - Neutropenia resolved, stop Granix 7/8/19  - platelets remain < 20, no bleeding, continue to monitor.  - she has been afebrile, will stop cefepime. Pulmonary edema. Continue diuresis. Hold hydration. Pleural effusion  - not able to have thoracentesis right now d/t thrombocytopenia    Pneumonia    History of congestive heart failure. Echo will be repeated. Atrial fibrillation. History of breast cancer treated with Adriamycin and Cytoxan.   No evidence

## 2019-07-09 NOTE — PROGRESS NOTES
[address]    Radiology: All pertinent images / reports were reviewed as a part of this visit. Narrative   EXAMINATION:   ONE XRAY VIEW OF THE CHEST       7/2/2019 6:27 pm       COMPARISON:   Prior studies including most recent chest 06/07/2019 , CT 12/14/2018, chest   x-ray March 2016       HISTORY:   ORDERING SYSTEM PROVIDED HISTORY: shortness of breath   TECHNOLOGIST PROVIDED HISTORY:   Reason for exam:->shortness of breath   Ordering Physician Provided Reason for Exam: SOB   Acuity: Unknown   Type of Exam: Unknown       FINDINGS:   Metallic hardware projects over proximal left humerus compatible with prior   fracture.  There is evidence of prior augmentation of L1 compression fracture   with radiopaque segment.  A left subclavian central venous catheter is   unchanged.  A metallic device projects over the lower left chest.       The heart size and mediastinal contours are stable.       Bilateral pleural effusions are noted, greater on the right, with bibasilar   airspace disease, also greater on the right.  Since the prior study, the left   pleural effusion has developed.  The right pleural effusion is similar in   size bed airspace disease within the right lung base has increased.           Impression   None bilateral pleural effusions with associated bibasilar airspace disease,   greater on the right.  Airspace disease could represent atelectasis and/or   pneumonia. Faviola Sidle is considered less likely but not excluded. Problem List:     Community-acquired pneumonia  Small bilateral pleural effusions  Pancytopenia  MDS    Assessment/Plan:     Patient has myelodysplastic syndrome, recently started on Vidaza-poor tolerance. Pancytopenia-improved white count to 4. Granix has been discontinued. Repeat chest x-ray/CT imaging suggestive of right lower lobe infiltrate with an underlying significant pleural effusion. Patient very frail, significant thrombocytopenia with platelet count of 14.   Discussed with

## 2019-07-09 NOTE — PLAN OF CARE
Problem: Falls - Risk of:  Goal: Will remain free from falls  Description  Will remain free from falls  7/9/2019 0541 by Meredith Zabala RN  Outcome: Met This Shift  7/8/2019 1629 by Duran Eddy RN  Outcome: Ongoing     Pt has remained free from falls, utilizes call light appropriately. Pt has not gotten OOB throughout the night, but per pt report, she is very weak and usually utilizes wheelchair. Problem: Risk for Impaired Skin Integrity  Goal: Tissue integrity - skin and mucous membranes  Description  Structural intactness and normal physiological function of skin and  mucous membranes. 7/9/2019 0541 by Meredith Zabala RN  Outcome: Met This Shift  7/8/2019 1629 by Duran Eddy RN  Outcome: Ongoing     Pt is incontinent of urine, purewick in place that has managed pt's urinary needs throughout the night.

## 2019-07-10 NOTE — PROGRESS NOTES
07/09/19  0500 07/10/19  0510   ALT 7* 7* 8*   AST 16 16 18   ALKPHOS 59 82 65   PROT 6.0* 6.7 7.3   AGRATIO 1.1 1.0* 0.9*   BILITOT 1.5* 1.1* 1.0         Vic Rojas D.O., [facility]  Interventional Cardiology     o: [phone]  [facility] [address], [address]      NOTE:  This report was transcribed using voice recognition software. Every effort was made to ensure accuracy; however, inadvertent computerized transcription errors may be present.

## 2019-07-10 NOTE — PROGRESS NOTES
to oral Augmentin + Lasix. Benadryl for cough. Plans for transfer to [facility] tomorrow.     Nancy Arredondo MD

## 2019-07-10 NOTE — PROGRESS NOTES
CLINICAL BEDSIDE SWALLOWING EVALUATION  Speech Therapy Department    Patient Name:  Ericka Mercedes  :  1935  Pain level: denies   Medical Diagnosis:   Neutropenia ([address] [D70.9]    HPI: 7/3/2019\"Patient is admitted to the hospital after presenting to Dr. Pamella Dean office with weakness, fatigue and a recent fall at home. She was found to be pancytopenic. She was admitted to the hospital and empirically started on Merrem. Chest x-ray at the time of admission reveals evidence of bilateral pleural effusion, right greater than left. She has a history of myelodysplastic syndrome. She also has a history of atrial fibrillation treated with Xarelto\"  CT chest:  Moderate to large right-sided pleural effusion, appears increased compared to   CT scan 2018.       Volume loss and/or airspace disease within the right lung is worse compared   to prior CT scan as well.       New small left-sided pleural effusion.  Minimal left basilar volume loss.       Considering the increasing bilateral pleural effusions, pulmonary edema   should be considered.  Associated pneumonia is not excluded.       Slight interval increase in size of the small pericardial effusion.       Atherosclerotic changes are noted as described above. SLP eval and treat orders received. Per pulmonology note\"Infiltrate could be passive atelectasis from effusion. Swallow eval to rule out aspiration. \" Per discussion with RN, pt had difficulty with fruit yesterday and appeared to be coughing and choking. RN reported pt was eating reclined in bed at that time. Treatment Diagnosis: Mild Oropharyngeal Dysphagia    Impressions: Upon SLP entrance into room pt was consuming burgos reclined in bed with intermittent throat clearing. Pt was repositioned upright in bed by SLP. On RA. Oral mechanism examination appeared to be grossly [facility]. Various consistency trials were provided to assess swallow function.  Pt demonstrated adequate mastication,

## 2019-07-11 NOTE — DISCHARGE SUMMARY
[facility]  DISCHARGE SUMMARY NOTE    Patient. James Frey  Date of Birth. 1935  MRN. [id number]     Primary care provider. ANABELL Salinas CNP  (Tel: [phone])  Primary Oncologist: No primary care provider on file. Admit date: 7/2/2019    Discharge date (blank if same as Note Date): Note Date: 7/11/2019     Chief Complaint: No chief complaint on file. 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, with nonunion, subsequent encounter    Primary insomnia    Current moderate episode of major depressive disorder without prior episode (HCC)    Neutropenia ([address]    Community acquired pneumonia    Pancytopenia (HCC)    Pleural effusion, bilateral    Chronic diastolic heart failure ([address]        Consultations: IP CONSULT TO CARDIOLOGY  IP CONSULT TO SOCIAL WORK  IP CONSULT TO DIETITIAN  IP CONSULT TO PULMONOLOGY  IP CONSULT TO PALLIATIVE CARE  IP CONSULT TO HOSPICE  IP CONSULT TO SOCIAL WORK    Problems Addressed/ Brief Summary of Patient's Course:  James Frey is a 81 y.o. female patient of Paola Davis MD who is treated for MDS. She has received 1 cycle of Vidaza 6/10 - 6/15. She has weakness and fatigue. She fell at home. She has a large bruise on her left arm from the fall. She reports shortness of breath. She has pancytopenia with profound neutropenia. She was hypotensive. She received hydration. She has a history of atrial fibrillation.  She is on multiple antihypertensive medications. She is also on Xarelto, which has been held d/t thrombocytopenia. She has a right moderate to large pleural effusion. Thoracentesis could not be performed d/t risk of bleeding d/t thrombocytopenia. She developed pulmonary edema and received lasix. She received 2 units of PRBCs and filgrastim while hospitalized. WBC and Hgb have improved. Platelets remain low, but have improved. She has no evidence of bleeding. She is stable and will be transferred to SNF for rehab.        Physical Exam on the day of Discharge:  BP (!) 140/70   Pulse 83   Temp 98.1 °F (36.7 °C) (Oral)   Resp 16   Ht 5' 8\" (1.727 m)   Wt 139 lb 14.4 oz (63.5 kg)   SpO2 98%   BMI 21.27 kg/m²   General appearance. Alert. Looks comfortable. HEENT. Sclera clear. Moist mucus membranes. Cardiovascular. Regular rate and rhythm, normal S1, S2. No murmur. Respiratory. Not using accessory muscles. Clear to auscultation bilaterally, no wheeze. Gastrointestinal. Abdomen soft, non-tender, not distended, normal bowel sounds  Neurology. Facial symmetry. No speech deficits. Moving all extremities equally. Extremities. No edema in lower extremities. Skin.  Warm, dry, normal turgor    Discharge Medications:      Medication List      START taking these medications    amoxicillin-clavulanate 500-125 MG per tablet  Commonly known as:  AUGMENTIN  Take 1 tablet by mouth every 8 hours for 10 days     furosemide 20 MG tablet  Commonly known as:  LASIX  Take 1 tablet by mouth daily  Start taking on:  7/12/2019     megestrol 40 MG/ML suspension  Commonly known as:  MEGACE  Take 10 mLs by mouth daily        CHANGE how you take these medications    digoxin 125 MCG tablet  Commonly known as:  LANOXIN  Take 1 tablet by mouth daily  What changed:    · how much to take  · additional instructions        CONTINUE taking these medications    acetaminophen 325 MG tablet  Commonly known as:  TYLENOL     albuterol sulfate HFA 108 (90 Base) MCG/ACT inhaler  Inhale 1 puff into the lungs every 6 hours as needed for Wheezing or Shortness of Breath     CITRACAL +D3 PO     CO Q 10 PO     diltiazem 180 MG extended release capsule  Commonly known as:  CARDIZEM CD  Take 1 capsule by mouth daily     levothyroxine 100 MCG tablet  Commonly known as:  SYNTHROID  Take 1 tablet by mouth Daily MUST BE BRAND NAME     MILK OF MAGNESIA 400 MG/5ML suspension  Generic drug:  magnesium hydroxide     sertraline 100 MG tablet  Commonly known as:  ZOLOFT  Take 1 tablet by mouth daily     Spacer/Aero Chamber Mouthpiece Misc  1 each by Does not apply route once as needed (inhaler use)     spironolactone 25 MG tablet  Commonly known as:  ALDACTONE  Take 1 tablet by mouth daily     vitamin D3 1000 units Tabs        STOP taking these medications    rivaroxaban 20 MG Tabs tablet  Commonly known as:  Angy Pillow your doctor about these medications    lidocaine 5 %  Commonly known as:  LIDODERM           Where to Get Your Medications      These medications were sent to [address], [address] [facility] [phone] [facility] [phone]  [address] [facility], [address] [facility]    Phone:  [phone]   · amoxicillin-clavulanate 500-125 MG per tablet  · digoxin 125 MCG tablet  · furosemide 20 MG tablet  · megestrol 40 MG/ML suspension         Discharged Condition: fair    Disposition: SNF    Discharge Instructions: Activity: activity as tolerated  Diet: regular diet    Follow up: with Dr. Jose Maria Ardon in about a week. Karthik Brock CNP  [facility]    Time spent in discharge process:  > 30 minutes with over half of that time spent in direct patient contact.

## 2019-07-11 NOTE — PROGRESS NOTES
negative for diabetic symptoms including none, neuropathy, polyphagia, polyuria, polydipsia, vomiting and diarrhea and temperature intolerance  Allergic/Immunologic: negative for anaphylaxis, angioedema, hay fever and urticaria    Objective:     Patient Vitals for the past 8 hrs:   BP Temp Temp src Pulse Resp SpO2 Weight   07/11/19 0600 -- -- -- 83 -- -- --   07/11/19 0416 115/68 97.7 °F (36.5 °C) Oral 83 18 100 % 139 lb 14.4 oz (63.5 kg)   07/11/19 0400 -- -- -- 80 -- -- --   07/11/19 0200 -- -- -- 85 -- -- --     I/O last 3 completed shifts: In: 240 [P.O.:240]  Out: -   No intake/output data recorded.     General Appearance: alert and oriented to person, place and time, well developed and well- nourished, in no acute distress  Skin: warm and dry, no rash or erythema  Head: normocephalic and atraumatic  Eyes: pupils equal, round, and reactive to light, extraocular eye movements intact, conjunctivae normal  ENT: external ear and ear canal normal bilaterally, nose without deformity, nasal mucosa and turbinates normal  Neck: supple and non-tender without mass, no cervical lymphadenopathy  Pulmonary/Chest: Bilateral rales with decreased air entry at both bases  Cardiovascular: normal rate, regular rhythm,  no murmurs, rubs, distal pulses intact, no carotid bruits  Abdomen: soft, non-tender, non-distended, normal bowel sounds, no masses or organomegaly  Lymph Nodes: Cervical, supraclavicular normal  Extremities: no cyanosis, clubbing or edema  Musculoskeletal: normal range of motion, no joint swelling, deformity or tenderness  Neurologic: alert, no focal neurologic deficits    Data Review:  CBC:   Lab Results   Component Value Date    WBC 3.5 07/11/2019    RBC 3.01 07/11/2019    RBC 4.29 11/08/2016     BMP:   Lab Results   Component Value Date    GLUCOSE 104 07/11/2019    GLUCOSE 91 12/08/2015    CO2 30 07/11/2019    BUN 26 07/11/2019    CREATININE 0.7 07/11/2019    CALCIUM 9.3 07/11/2019     ABG: No results found for:

## 2019-07-11 NOTE — PROGRESS NOTES
Speech Language Pathology  Dysphagia Treatment Note    Name:  Rodríguez Vera  :   1935  Medical Diagnosis:  Neutropenia ([facility] [address] [D70.9]  Treatment Diagnosis: Oropharyngeal Dysphagia  Pain level: Denies      Current Diet Level: 1) Regular texture diet with thin liquids, meds as tolerated   2) If aspiration is suspected to be a contributing factor to respiratory status recommend completion of Modified Barium Swallow to further assess pharyngeal phase of swallow   Tolerance of Current Diet Level:  Per chart review, no documented difficulties with current diet level noted. Daughter reported pt demonstrated some coughing with burgos this AM.     Assessment of Texture Tolerance:  -Impressions: Pt was positioned upright in bed on O2 via nasal cannula. Trials of thin liquids via straw and regular solids were provided to assess diet tolerance. Pt demonstrated minimally prolonged but effective mastication. Pharyngeal pooling was suspected with minimally delayed swallow initiation. Intermittently pt appeared to utilize effortful swallows to clear bolus. No overt clinical s/s of aspiration/penetration assessed this date. Education was provided to pt and pt's daughter re; aspiration risk and recommendations. Diet and Treatment Recommendations:  Continue current diet recommendations. (Dysphagia Goals addressed, if appropriate)  1.) Pt will tolerate recommended diet without s/s of aspiration (ongoing 2019)   2.) If clinical symptoms of penetration/aspiration continue to be noted,Pt will tolerate MBS to r/o aspiration and determine appropriate diet/liquid level. (ongoing 2019)      Plan:  Continued daily Dysphagia treatment with goals per plan of care. Patient/Family Education:Education given to the Pt, pt's daughter and nurse, who verbalized understanding    Discharge Recommendations:  Pt will benefit from continued skilled Speech Therapy for Dysphagia services, prior to returning home.     Timed Code

## 2019-07-16 NOTE — TELEPHONE ENCOUNTER
I spoke with the patient's daughter, Janeth Harding. The patient's daughter reports that the patient was admitted for MDS and low white blood cell count, hemoglobin and hematocrit as well as platelets. She had multiple transfusions and was discharged to a skilled nursing facility but the patient was taken home a few days later. The patient was requesting to be at home. She now has home hospice intermittently. The patient's daughter states that the patient's mood is stable and she is content and happy watching [employer]. She is now on Lasix again due to pulmonary edema, per pt's daughter, which is causing urinary incontinence intermittently as she is unable to ambulate quickly to the bedside commode. The patient is also fatigued and is eating less. I recommended boost/Ensure. Pt's daughter/pt will call with questions/concerns. Continue with oncology and hospice.   Electronically signed by: ANABELL Cerrato CNP 07/16/19

## 2019-08-01 NOTE — PROGRESS NOTES
Carelink/loop recorder shows normal function. No arrhythmias noted. See PACEART report under cardiology tab. Battery at [facility] will request an appointment.

## 2019-12-20 ENCOUNTER — TELEPHONE (OUTPATIENT)
Dept: CARDIOLOGY CLINIC | Age: 84
End: 2019-12-20